# Patient Record
Sex: FEMALE | Race: WHITE | NOT HISPANIC OR LATINO | Employment: OTHER | URBAN - METROPOLITAN AREA
[De-identification: names, ages, dates, MRNs, and addresses within clinical notes are randomized per-mention and may not be internally consistent; named-entity substitution may affect disease eponyms.]

---

## 2017-01-05 ENCOUNTER — ALLSCRIPTS OFFICE VISIT (OUTPATIENT)
Dept: OTHER | Facility: OTHER | Age: 62
End: 2017-01-05

## 2017-01-12 ENCOUNTER — GENERIC CONVERSION - ENCOUNTER (OUTPATIENT)
Dept: OTHER | Facility: OTHER | Age: 62
End: 2017-01-12

## 2017-01-25 ENCOUNTER — APPOINTMENT (OUTPATIENT)
Dept: LAB | Age: 62
End: 2017-01-25
Payer: COMMERCIAL

## 2017-01-25 ENCOUNTER — TRANSCRIBE ORDERS (OUTPATIENT)
Dept: ADMINISTRATIVE | Age: 62
End: 2017-01-25

## 2017-01-25 DIAGNOSIS — I10 ESSENTIAL (PRIMARY) HYPERTENSION: ICD-10-CM

## 2017-01-25 DIAGNOSIS — E55.9 VITAMIN D DEFICIENCY: ICD-10-CM

## 2017-01-25 DIAGNOSIS — E78.5 HYPERLIPIDEMIA: ICD-10-CM

## 2017-01-25 DIAGNOSIS — E11.9 TYPE 2 DIABETES MELLITUS WITHOUT COMPLICATIONS (HCC): ICD-10-CM

## 2017-01-25 LAB
25(OH)D3 SERPL-MCNC: 35.8 NG/ML (ref 30–100)
ALBUMIN SERPL BCP-MCNC: 3.5 G/DL (ref 3.5–5)
ALP SERPL-CCNC: 69 U/L (ref 46–116)
ALT SERPL W P-5'-P-CCNC: 32 U/L (ref 12–78)
ANION GAP SERPL CALCULATED.3IONS-SCNC: 9 MMOL/L (ref 4–13)
AST SERPL W P-5'-P-CCNC: 27 U/L (ref 5–45)
BASOPHILS # BLD AUTO: 0.05 THOUSANDS/ΜL (ref 0–0.1)
BASOPHILS NFR BLD AUTO: 1 % (ref 0–1)
BILIRUB SERPL-MCNC: 0.5 MG/DL (ref 0.2–1)
BUN SERPL-MCNC: 10 MG/DL (ref 5–25)
CALCIUM SERPL-MCNC: 9.1 MG/DL (ref 8.3–10.1)
CHLORIDE SERPL-SCNC: 105 MMOL/L (ref 100–108)
CHOLEST SERPL-MCNC: 182 MG/DL (ref 50–200)
CO2 SERPL-SCNC: 27 MMOL/L (ref 21–32)
CREAT SERPL-MCNC: 0.7 MG/DL (ref 0.6–1.3)
CREAT UR-MCNC: 99.2 MG/DL
EOSINOPHIL # BLD AUTO: 0.42 THOUSAND/ΜL (ref 0–0.61)
EOSINOPHIL NFR BLD AUTO: 5 % (ref 0–6)
ERYTHROCYTE [DISTWIDTH] IN BLOOD BY AUTOMATED COUNT: 13.6 % (ref 11.6–15.1)
GFR SERPL CREATININE-BSD FRML MDRD: >60 ML/MIN/1.73SQ M
GLUCOSE SERPL-MCNC: 156 MG/DL (ref 65–140)
HCT VFR BLD AUTO: 40.7 % (ref 34.8–46.1)
HDLC SERPL-MCNC: 82 MG/DL (ref 40–60)
HGB BLD-MCNC: 13.5 G/DL (ref 11.5–15.4)
LDLC SERPL CALC-MCNC: 54 MG/DL (ref 0–100)
LYMPHOCYTES # BLD AUTO: 1.9 THOUSANDS/ΜL (ref 0.6–4.47)
LYMPHOCYTES NFR BLD AUTO: 23 % (ref 14–44)
MCH RBC QN AUTO: 29.5 PG (ref 26.8–34.3)
MCHC RBC AUTO-ENTMCNC: 33.2 G/DL (ref 31.4–37.4)
MCV RBC AUTO: 89 FL (ref 82–98)
MICROALBUMIN UR-MCNC: 29.4 MG/L (ref 0–20)
MICROALBUMIN/CREAT 24H UR: 30 MG/G CREATININE (ref 0–30)
MONOCYTES # BLD AUTO: 0.42 THOUSAND/ΜL (ref 0.17–1.22)
MONOCYTES NFR BLD AUTO: 5 % (ref 4–12)
NEUTROPHILS # BLD AUTO: 5.53 THOUSANDS/ΜL (ref 1.85–7.62)
NEUTS SEG NFR BLD AUTO: 66 % (ref 43–75)
NRBC BLD AUTO-RTO: 0 /100 WBCS
PLATELET # BLD AUTO: 301 THOUSANDS/UL (ref 149–390)
PMV BLD AUTO: 11.2 FL (ref 8.9–12.7)
POTASSIUM SERPL-SCNC: 4.2 MMOL/L (ref 3.5–5.3)
PROT SERPL-MCNC: 7.4 G/DL (ref 6.4–8.2)
RBC # BLD AUTO: 4.57 MILLION/UL (ref 3.81–5.12)
SODIUM SERPL-SCNC: 141 MMOL/L (ref 136–145)
TRIGL SERPL-MCNC: 231 MG/DL
TSH SERPL DL<=0.05 MIU/L-ACNC: 2.69 UIU/ML (ref 0.36–3.74)
WBC # BLD AUTO: 8.34 THOUSAND/UL (ref 4.31–10.16)

## 2017-01-25 PROCEDURE — 80061 LIPID PANEL: CPT

## 2017-01-25 PROCEDURE — 36415 COLL VENOUS BLD VENIPUNCTURE: CPT

## 2017-01-25 PROCEDURE — 80053 COMPREHEN METABOLIC PANEL: CPT

## 2017-01-25 PROCEDURE — 84443 ASSAY THYROID STIM HORMONE: CPT

## 2017-01-25 PROCEDURE — 85025 COMPLETE CBC W/AUTO DIFF WBC: CPT

## 2017-01-25 PROCEDURE — 82306 VITAMIN D 25 HYDROXY: CPT

## 2017-01-25 PROCEDURE — 82570 ASSAY OF URINE CREATININE: CPT

## 2017-01-25 PROCEDURE — 82043 UR ALBUMIN QUANTITATIVE: CPT

## 2017-01-27 ENCOUNTER — GENERIC CONVERSION - ENCOUNTER (OUTPATIENT)
Dept: OTHER | Facility: OTHER | Age: 62
End: 2017-01-27

## 2017-01-30 ENCOUNTER — ALLSCRIPTS OFFICE VISIT (OUTPATIENT)
Dept: OTHER | Facility: OTHER | Age: 62
End: 2017-01-30

## 2017-01-30 DIAGNOSIS — R26.9 ABNORMALITY OF GAIT AND MOBILITY: ICD-10-CM

## 2017-01-30 DIAGNOSIS — Z12.31 ENCOUNTER FOR SCREENING MAMMOGRAM FOR MALIGNANT NEOPLASM OF BREAST: ICD-10-CM

## 2017-01-30 DIAGNOSIS — M54.50 LOW BACK PAIN: ICD-10-CM

## 2017-02-01 ENCOUNTER — APPOINTMENT (OUTPATIENT)
Dept: PHYSICAL THERAPY | Facility: REHABILITATION | Age: 62
End: 2017-02-01
Payer: COMMERCIAL

## 2017-02-01 ENCOUNTER — GENERIC CONVERSION - ENCOUNTER (OUTPATIENT)
Dept: OTHER | Facility: OTHER | Age: 62
End: 2017-02-01

## 2017-02-01 ENCOUNTER — ALLSCRIPTS OFFICE VISIT (OUTPATIENT)
Dept: OTHER | Facility: OTHER | Age: 62
End: 2017-02-01

## 2017-02-01 DIAGNOSIS — R26.9 ABNORMALITY OF GAIT AND MOBILITY: ICD-10-CM

## 2017-02-01 DIAGNOSIS — M54.50 LOW BACK PAIN: ICD-10-CM

## 2017-02-01 PROCEDURE — 97162 PT EVAL MOD COMPLEX 30 MIN: CPT

## 2017-02-07 ENCOUNTER — APPOINTMENT (OUTPATIENT)
Dept: PHYSICAL THERAPY | Facility: REHABILITATION | Age: 62
End: 2017-02-07
Payer: COMMERCIAL

## 2017-02-07 ENCOUNTER — ALLSCRIPTS OFFICE VISIT (OUTPATIENT)
Dept: OTHER | Facility: OTHER | Age: 62
End: 2017-02-07

## 2017-02-10 ENCOUNTER — APPOINTMENT (OUTPATIENT)
Dept: PHYSICAL THERAPY | Facility: REHABILITATION | Age: 62
End: 2017-02-10
Payer: COMMERCIAL

## 2017-02-14 ENCOUNTER — APPOINTMENT (OUTPATIENT)
Dept: PHYSICAL THERAPY | Facility: REHABILITATION | Age: 62
End: 2017-02-14
Payer: COMMERCIAL

## 2017-02-14 PROCEDURE — 97110 THERAPEUTIC EXERCISES: CPT

## 2017-02-14 PROCEDURE — 97140 MANUAL THERAPY 1/> REGIONS: CPT

## 2017-02-16 ENCOUNTER — APPOINTMENT (OUTPATIENT)
Dept: PHYSICAL THERAPY | Facility: REHABILITATION | Age: 62
End: 2017-02-16
Payer: COMMERCIAL

## 2017-02-16 PROCEDURE — 97140 MANUAL THERAPY 1/> REGIONS: CPT

## 2017-02-16 PROCEDURE — 97110 THERAPEUTIC EXERCISES: CPT

## 2017-02-17 ENCOUNTER — APPOINTMENT (OUTPATIENT)
Dept: PHYSICAL THERAPY | Facility: REHABILITATION | Age: 62
End: 2017-02-17
Payer: COMMERCIAL

## 2017-02-21 ENCOUNTER — APPOINTMENT (OUTPATIENT)
Dept: PHYSICAL THERAPY | Facility: REHABILITATION | Age: 62
End: 2017-02-21
Payer: COMMERCIAL

## 2017-02-21 ENCOUNTER — ALLSCRIPTS OFFICE VISIT (OUTPATIENT)
Dept: OTHER | Facility: OTHER | Age: 62
End: 2017-02-21

## 2017-02-21 PROCEDURE — 97140 MANUAL THERAPY 1/> REGIONS: CPT

## 2017-02-21 PROCEDURE — 97110 THERAPEUTIC EXERCISES: CPT

## 2017-02-24 ENCOUNTER — APPOINTMENT (OUTPATIENT)
Dept: PHYSICAL THERAPY | Facility: REHABILITATION | Age: 62
End: 2017-02-24
Payer: COMMERCIAL

## 2017-02-24 PROCEDURE — 97110 THERAPEUTIC EXERCISES: CPT

## 2017-02-28 ENCOUNTER — APPOINTMENT (OUTPATIENT)
Dept: PHYSICAL THERAPY | Facility: REHABILITATION | Age: 62
End: 2017-02-28
Payer: COMMERCIAL

## 2017-02-28 PROCEDURE — 97110 THERAPEUTIC EXERCISES: CPT

## 2017-03-03 ENCOUNTER — APPOINTMENT (OUTPATIENT)
Dept: PHYSICAL THERAPY | Facility: REHABILITATION | Age: 62
End: 2017-03-03
Payer: COMMERCIAL

## 2017-03-03 PROCEDURE — 97110 THERAPEUTIC EXERCISES: CPT

## 2017-03-07 ENCOUNTER — APPOINTMENT (OUTPATIENT)
Dept: PHYSICAL THERAPY | Facility: REHABILITATION | Age: 62
End: 2017-03-07
Payer: COMMERCIAL

## 2017-03-07 PROCEDURE — 97110 THERAPEUTIC EXERCISES: CPT

## 2017-03-10 ENCOUNTER — APPOINTMENT (OUTPATIENT)
Dept: PHYSICAL THERAPY | Facility: REHABILITATION | Age: 62
End: 2017-03-10
Payer: COMMERCIAL

## 2017-03-23 ENCOUNTER — ALLSCRIPTS OFFICE VISIT (OUTPATIENT)
Dept: OTHER | Facility: OTHER | Age: 62
End: 2017-03-23

## 2017-03-30 ENCOUNTER — ALLSCRIPTS OFFICE VISIT (OUTPATIENT)
Dept: OTHER | Facility: OTHER | Age: 62
End: 2017-03-30

## 2017-04-13 ENCOUNTER — ALLSCRIPTS OFFICE VISIT (OUTPATIENT)
Dept: OTHER | Facility: OTHER | Age: 62
End: 2017-04-13

## 2017-04-13 DIAGNOSIS — Z11.59 ENCOUNTER FOR SCREENING FOR OTHER VIRAL DISEASES: ICD-10-CM

## 2017-04-13 DIAGNOSIS — E11.9 TYPE 2 DIABETES MELLITUS WITHOUT COMPLICATIONS (HCC): ICD-10-CM

## 2017-04-20 ENCOUNTER — TRANSCRIBE ORDERS (OUTPATIENT)
Dept: ADMINISTRATIVE | Age: 62
End: 2017-04-20

## 2017-04-20 ENCOUNTER — APPOINTMENT (OUTPATIENT)
Dept: LAB | Age: 62
End: 2017-04-20
Payer: COMMERCIAL

## 2017-04-20 ENCOUNTER — ALLSCRIPTS OFFICE VISIT (OUTPATIENT)
Dept: OTHER | Facility: OTHER | Age: 62
End: 2017-04-20

## 2017-04-20 ENCOUNTER — GENERIC CONVERSION - ENCOUNTER (OUTPATIENT)
Dept: OTHER | Facility: OTHER | Age: 62
End: 2017-04-20

## 2017-04-20 DIAGNOSIS — E11.9 TYPE 2 DIABETES MELLITUS WITHOUT COMPLICATIONS (HCC): ICD-10-CM

## 2017-04-20 LAB
ALBUMIN SERPL BCP-MCNC: 3.5 G/DL (ref 3.5–5)
ALP SERPL-CCNC: 76 U/L (ref 46–116)
ALT SERPL W P-5'-P-CCNC: 52 U/L (ref 12–78)
ANION GAP SERPL CALCULATED.3IONS-SCNC: 9 MMOL/L (ref 4–13)
AST SERPL W P-5'-P-CCNC: 68 U/L (ref 5–45)
BILIRUB SERPL-MCNC: 0.31 MG/DL (ref 0.2–1)
BUN SERPL-MCNC: 11 MG/DL (ref 5–25)
CALCIUM SERPL-MCNC: 9.8 MG/DL (ref 8.3–10.1)
CHLORIDE SERPL-SCNC: 103 MMOL/L (ref 100–108)
CO2 SERPL-SCNC: 28 MMOL/L (ref 21–32)
CREAT SERPL-MCNC: 0.69 MG/DL (ref 0.6–1.3)
EST. AVERAGE GLUCOSE BLD GHB EST-MCNC: 151 MG/DL
GFR SERPL CREATININE-BSD FRML MDRD: >60 ML/MIN/1.73SQ M
GLUCOSE SERPL-MCNC: 95 MG/DL (ref 65–140)
HBA1C MFR BLD: 6.9 % (ref 4.2–6.3)
POTASSIUM SERPL-SCNC: 4.6 MMOL/L (ref 3.5–5.3)
PROT SERPL-MCNC: 7.6 G/DL (ref 6.4–8.2)
SODIUM SERPL-SCNC: 140 MMOL/L (ref 136–145)

## 2017-04-20 PROCEDURE — 36415 COLL VENOUS BLD VENIPUNCTURE: CPT

## 2017-04-20 PROCEDURE — 83036 HEMOGLOBIN GLYCOSYLATED A1C: CPT

## 2017-04-20 PROCEDURE — 80053 COMPREHEN METABOLIC PANEL: CPT

## 2017-04-24 ENCOUNTER — ALLSCRIPTS OFFICE VISIT (OUTPATIENT)
Dept: OTHER | Facility: OTHER | Age: 62
End: 2017-04-24

## 2017-04-27 ENCOUNTER — ALLSCRIPTS OFFICE VISIT (OUTPATIENT)
Dept: OTHER | Facility: OTHER | Age: 62
End: 2017-04-27

## 2017-05-05 ENCOUNTER — HOSPITAL ENCOUNTER (OUTPATIENT)
Dept: RADIOLOGY | Age: 62
Discharge: HOME | End: 2017-05-05
Payer: COMMERCIAL

## 2017-05-05 ENCOUNTER — GENERIC CONVERSION - ENCOUNTER (OUTPATIENT)
Dept: OTHER | Facility: OTHER | Age: 62
End: 2017-05-05

## 2017-05-05 DIAGNOSIS — Z12.31 ENCOUNTER FOR SCREENING MAMMOGRAM FOR MALIGNANT NEOPLASM OF BREAST: ICD-10-CM

## 2017-05-05 PROCEDURE — G0202 SCR MAMMO BI INCL CAD: HCPCS

## 2017-05-06 ENCOUNTER — OFFICE VISIT (OUTPATIENT)
Dept: URGENT CARE | Facility: MEDICAL CENTER | Age: 62
End: 2017-05-06
Payer: COMMERCIAL

## 2017-05-06 PROCEDURE — 99213 OFFICE O/P EST LOW 20 MIN: CPT

## 2017-05-06 PROCEDURE — G0463 HOSPITAL OUTPT CLINIC VISIT: HCPCS

## 2017-06-07 ENCOUNTER — ALLSCRIPTS OFFICE VISIT (OUTPATIENT)
Dept: OTHER | Facility: OTHER | Age: 62
End: 2017-06-07

## 2017-06-07 DIAGNOSIS — E66.01 MORBID (SEVERE) OBESITY DUE TO EXCESS CALORIES (HCC): ICD-10-CM

## 2017-06-07 DIAGNOSIS — R74.8 ABNORMAL LEVELS OF OTHER SERUM ENZYMES: ICD-10-CM

## 2017-06-08 ENCOUNTER — HOSPITAL ENCOUNTER (OUTPATIENT)
Dept: RADIOLOGY | Age: 62
Discharge: HOME | End: 2017-06-08
Payer: COMMERCIAL

## 2017-06-08 DIAGNOSIS — R74.8 ABNORMAL LEVELS OF OTHER SERUM ENZYMES: ICD-10-CM

## 2017-06-08 DIAGNOSIS — E66.01 MORBID (SEVERE) OBESITY DUE TO EXCESS CALORIES (HCC): ICD-10-CM

## 2017-06-08 PROCEDURE — 76705 ECHO EXAM OF ABDOMEN: CPT

## 2017-06-13 ENCOUNTER — ALLSCRIPTS OFFICE VISIT (OUTPATIENT)
Dept: OTHER | Facility: OTHER | Age: 62
End: 2017-06-13

## 2017-06-19 ENCOUNTER — ALLSCRIPTS OFFICE VISIT (OUTPATIENT)
Dept: OTHER | Facility: OTHER | Age: 62
End: 2017-06-19

## 2017-06-29 ENCOUNTER — ALLSCRIPTS OFFICE VISIT (OUTPATIENT)
Dept: OTHER | Facility: OTHER | Age: 62
End: 2017-06-29

## 2017-07-05 ENCOUNTER — ALLSCRIPTS OFFICE VISIT (OUTPATIENT)
Dept: OTHER | Facility: OTHER | Age: 62
End: 2017-07-05

## 2017-07-21 DIAGNOSIS — E11.9 TYPE 2 DIABETES MELLITUS WITHOUT COMPLICATIONS (HCC): ICD-10-CM

## 2017-07-28 ENCOUNTER — GENERIC CONVERSION - ENCOUNTER (OUTPATIENT)
Dept: OTHER | Facility: OTHER | Age: 62
End: 2017-07-28

## 2017-07-28 ENCOUNTER — APPOINTMENT (OUTPATIENT)
Dept: LAB | Age: 62
End: 2017-07-28
Payer: COMMERCIAL

## 2017-07-28 ENCOUNTER — TRANSCRIBE ORDERS (OUTPATIENT)
Dept: ADMINISTRATIVE | Age: 62
End: 2017-07-28

## 2017-07-28 DIAGNOSIS — Z11.59 ENCOUNTER FOR SCREENING FOR OTHER VIRAL DISEASES: ICD-10-CM

## 2017-07-28 DIAGNOSIS — E11.9 TYPE 2 DIABETES MELLITUS WITHOUT COMPLICATIONS (HCC): ICD-10-CM

## 2017-07-28 LAB
ALBUMIN SERPL BCP-MCNC: 3.6 G/DL (ref 3.5–5)
ALP SERPL-CCNC: 71 U/L (ref 46–116)
ALT SERPL W P-5'-P-CCNC: 44 U/L (ref 12–78)
ANION GAP SERPL CALCULATED.3IONS-SCNC: 7 MMOL/L (ref 4–13)
AST SERPL W P-5'-P-CCNC: 38 U/L (ref 5–45)
BILIRUB SERPL-MCNC: 0.45 MG/DL (ref 0.2–1)
BUN SERPL-MCNC: 9 MG/DL (ref 5–25)
CALCIUM SERPL-MCNC: 9.3 MG/DL (ref 8.3–10.1)
CHLORIDE SERPL-SCNC: 104 MMOL/L (ref 100–108)
CO2 SERPL-SCNC: 28 MMOL/L (ref 21–32)
CREAT SERPL-MCNC: 0.64 MG/DL (ref 0.6–1.3)
EST. AVERAGE GLUCOSE BLD GHB EST-MCNC: 166 MG/DL
GFR SERPL CREATININE-BSD FRML MDRD: 96 ML/MIN/1.73SQ M
GLUCOSE SERPL-MCNC: 160 MG/DL (ref 65–140)
HBA1C MFR BLD: 7.4 % (ref 4.2–6.3)
HCV AB SER QL: NORMAL
POTASSIUM SERPL-SCNC: 4.4 MMOL/L (ref 3.5–5.3)
PROT SERPL-MCNC: 7.4 G/DL (ref 6.4–8.2)
SODIUM SERPL-SCNC: 139 MMOL/L (ref 136–145)

## 2017-07-28 PROCEDURE — 80053 COMPREHEN METABOLIC PANEL: CPT

## 2017-07-28 PROCEDURE — 36415 COLL VENOUS BLD VENIPUNCTURE: CPT

## 2017-07-28 PROCEDURE — 83036 HEMOGLOBIN GLYCOSYLATED A1C: CPT

## 2017-07-28 PROCEDURE — 86803 HEPATITIS C AB TEST: CPT

## 2017-08-02 ENCOUNTER — ANESTHESIA EVENT (OUTPATIENT)
Dept: GASTROENTEROLOGY | Facility: HOSPITAL | Age: 62
End: 2017-08-02
Payer: COMMERCIAL

## 2017-08-02 RX ORDER — SIMVASTATIN 40 MG
40 TABLET ORAL
COMMUNITY
End: 2018-07-27 | Stop reason: SDUPTHER

## 2017-08-02 RX ORDER — QUINAPRIL 40 MG/1
40 TABLET ORAL
COMMUNITY
End: 2018-07-27 | Stop reason: SDUPTHER

## 2017-08-02 RX ORDER — CHOLECALCIFEROL (VITAMIN D3) 50 MCG
2000 TABLET ORAL DAILY
COMMUNITY

## 2017-08-02 RX ORDER — FLUTICASONE PROPIONATE 50 MCG
1 SPRAY, SUSPENSION (ML) NASAL DAILY
COMMUNITY

## 2017-08-02 RX ORDER — PAROXETINE HYDROCHLORIDE 40 MG/1
40 TABLET, FILM COATED ORAL EVERY MORNING
COMMUNITY
End: 2018-02-21

## 2017-08-02 RX ORDER — DICLOFENAC SODIUM AND MISOPROSTOL 75; 200 MG/1; UG/1
1 TABLET, FILM COATED ORAL 2 TIMES DAILY
COMMUNITY
End: 2019-07-12 | Stop reason: ALTCHOICE

## 2017-08-03 ENCOUNTER — HOSPITAL ENCOUNTER (OUTPATIENT)
Facility: HOSPITAL | Age: 62
Setting detail: OUTPATIENT SURGERY
Discharge: HOME | End: 2017-08-03
Attending: INTERNAL MEDICINE | Admitting: INTERNAL MEDICINE
Payer: COMMERCIAL

## 2017-08-03 ENCOUNTER — ANESTHESIA (OUTPATIENT)
Dept: GASTROENTEROLOGY | Facility: HOSPITAL | Age: 62
End: 2017-08-03
Payer: COMMERCIAL

## 2017-08-03 ENCOUNTER — GENERIC CONVERSION - ENCOUNTER (OUTPATIENT)
Dept: OTHER | Facility: OTHER | Age: 62
End: 2017-08-03

## 2017-08-03 VITALS
HEIGHT: 66 IN | TEMPERATURE: 98.9 F | WEIGHT: 293 LBS | HEART RATE: 106 BPM | SYSTOLIC BLOOD PRESSURE: 122 MMHG | DIASTOLIC BLOOD PRESSURE: 75 MMHG | OXYGEN SATURATION: 95 % | BODY MASS INDEX: 47.09 KG/M2 | RESPIRATION RATE: 16 BRPM

## 2017-08-03 DIAGNOSIS — Z12.11 ENCOUNTER FOR SCREENING FOR MALIGNANT NEOPLASM OF COLON: ICD-10-CM

## 2017-08-03 DIAGNOSIS — Z12.11 COLON CANCER SCREENING: Primary | ICD-10-CM

## 2017-08-03 PROCEDURE — 88305 TISSUE EXAM BY PATHOLOGIST: CPT | Performed by: INTERNAL MEDICINE

## 2017-08-03 RX ORDER — OXYBUTYNIN CHLORIDE 5 MG/1
5 TABLET ORAL 3 TIMES DAILY
COMMUNITY
End: 2018-04-30 | Stop reason: ALTCHOICE

## 2017-08-03 RX ORDER — LIDOCAINE HYDROCHLORIDE 10 MG/ML
INJECTION, SOLUTION INFILTRATION; PERINEURAL AS NEEDED
Status: DISCONTINUED | OUTPATIENT
Start: 2017-08-03 | End: 2017-08-03 | Stop reason: SURG

## 2017-08-03 RX ORDER — PROPOFOL 10 MG/ML
INJECTION, EMULSION INTRAVENOUS AS NEEDED
Status: DISCONTINUED | OUTPATIENT
Start: 2017-08-03 | End: 2017-08-03 | Stop reason: SURG

## 2017-08-03 RX ORDER — FEXOFENADINE HCL 180 MG/1
180 TABLET ORAL DAILY
COMMUNITY

## 2017-08-03 RX ORDER — SODIUM CHLORIDE 9 MG/ML
125 INJECTION, SOLUTION INTRAVENOUS CONTINUOUS
Status: DISCONTINUED | OUTPATIENT
Start: 2017-08-03 | End: 2017-08-03 | Stop reason: HOSPADM

## 2017-08-03 RX ADMIN — PROPOFOL 100 MG: 10 INJECTION, EMULSION INTRAVENOUS at 09:33

## 2017-08-03 RX ADMIN — PROPOFOL 20 MG: 10 INJECTION, EMULSION INTRAVENOUS at 09:46

## 2017-08-03 RX ADMIN — PROPOFOL 50 MG: 10 INJECTION, EMULSION INTRAVENOUS at 09:35

## 2017-08-03 RX ADMIN — SODIUM CHLORIDE 125 ML/HR: 0.9 INJECTION, SOLUTION INTRAVENOUS at 09:18

## 2017-08-03 RX ADMIN — LIDOCAINE HYDROCHLORIDE 20 MG: 10 INJECTION, SOLUTION INFILTRATION; PERINEURAL at 09:33

## 2017-08-03 RX ADMIN — PROPOFOL 50 MG: 10 INJECTION, EMULSION INTRAVENOUS at 09:36

## 2017-08-03 RX ADMIN — PROPOFOL 20 MG: 10 INJECTION, EMULSION INTRAVENOUS at 09:38

## 2017-08-03 RX ADMIN — PROPOFOL 20 MG: 10 INJECTION, EMULSION INTRAVENOUS at 09:48

## 2017-08-03 RX ADMIN — PROPOFOL 20 MG: 10 INJECTION, EMULSION INTRAVENOUS at 09:40

## 2017-08-07 ENCOUNTER — ALLSCRIPTS OFFICE VISIT (OUTPATIENT)
Dept: OTHER | Facility: OTHER | Age: 62
End: 2017-08-07

## 2017-08-13 ENCOUNTER — GENERIC CONVERSION - ENCOUNTER (OUTPATIENT)
Dept: OTHER | Facility: OTHER | Age: 62
End: 2017-08-13

## 2017-08-22 ENCOUNTER — GENERIC CONVERSION - ENCOUNTER (OUTPATIENT)
Dept: OTHER | Facility: OTHER | Age: 62
End: 2017-08-22

## 2017-08-29 ENCOUNTER — ALLSCRIPTS OFFICE VISIT (OUTPATIENT)
Dept: OTHER | Facility: OTHER | Age: 62
End: 2017-08-29

## 2017-09-14 ENCOUNTER — TRANSCRIBE ORDERS (OUTPATIENT)
Dept: ADMINISTRATIVE | Facility: HOSPITAL | Age: 62
End: 2017-09-14

## 2017-09-14 DIAGNOSIS — M81.0 SENILE OSTEOPOROSIS: Primary | ICD-10-CM

## 2017-09-21 ENCOUNTER — ALLSCRIPTS OFFICE VISIT (OUTPATIENT)
Dept: OTHER | Facility: OTHER | Age: 62
End: 2017-09-21

## 2017-09-26 ENCOUNTER — HOSPITAL ENCOUNTER (OUTPATIENT)
Dept: RADIOLOGY | Age: 62
Discharge: HOME | End: 2017-09-26
Payer: COMMERCIAL

## 2017-09-26 DIAGNOSIS — M81.0 SENILE OSTEOPOROSIS: ICD-10-CM

## 2017-09-26 PROCEDURE — 77080 DXA BONE DENSITY AXIAL: CPT

## 2017-10-25 ENCOUNTER — ALLSCRIPTS OFFICE VISIT (OUTPATIENT)
Dept: OTHER | Facility: OTHER | Age: 62
End: 2017-10-25

## 2017-11-06 ENCOUNTER — ALLSCRIPTS OFFICE VISIT (OUTPATIENT)
Dept: OTHER | Facility: OTHER | Age: 62
End: 2017-11-06

## 2017-11-07 DIAGNOSIS — R74.8 ABNORMAL LEVELS OF OTHER SERUM ENZYMES: ICD-10-CM

## 2017-11-07 DIAGNOSIS — E11.9 TYPE 2 DIABETES MELLITUS WITHOUT COMPLICATIONS (HCC): ICD-10-CM

## 2017-11-28 LAB
LEFT EYE DIABETIC RETINOPATHY: NORMAL
RIGHT EYE DIABETIC RETINOPATHY: NORMAL

## 2017-12-11 ENCOUNTER — TRANSCRIBE ORDERS (OUTPATIENT)
Dept: ADMINISTRATIVE | Age: 62
End: 2017-12-11

## 2017-12-11 ENCOUNTER — APPOINTMENT (OUTPATIENT)
Dept: LAB | Age: 62
End: 2017-12-11
Payer: COMMERCIAL

## 2017-12-11 ENCOUNTER — GENERIC CONVERSION - ENCOUNTER (OUTPATIENT)
Dept: OTHER | Facility: OTHER | Age: 62
End: 2017-12-11

## 2017-12-11 DIAGNOSIS — I10 ESSENTIAL HYPERTENSION, MALIGNANT: ICD-10-CM

## 2017-12-11 DIAGNOSIS — Z79.899 NEED FOR PROPHYLACTIC CHEMOTHERAPY: ICD-10-CM

## 2017-12-11 DIAGNOSIS — E13.8 DIABETES MELLITUS OF OTHER TYPE WITH COMPLICATION, UNSPECIFIED LONG TERM INSULIN USE STATUS: ICD-10-CM

## 2017-12-11 DIAGNOSIS — E11.9 TYPE 2 DIABETES MELLITUS WITHOUT COMPLICATIONS (HCC): ICD-10-CM

## 2017-12-11 DIAGNOSIS — E13.8 DIABETES MELLITUS OF OTHER TYPE WITH COMPLICATION, UNSPECIFIED LONG TERM INSULIN USE STATUS: Primary | ICD-10-CM

## 2017-12-11 DIAGNOSIS — R74.8 ABNORMAL LEVELS OF OTHER SERUM ENZYMES: ICD-10-CM

## 2017-12-11 LAB
25(OH)D3 SERPL-MCNC: 33.4 NG/ML (ref 30–100)
ALBUMIN SERPL BCP-MCNC: 3.6 G/DL (ref 3.5–5)
ALP SERPL-CCNC: 64 U/L (ref 46–116)
ALT SERPL W P-5'-P-CCNC: 37 U/L (ref 12–78)
ANION GAP SERPL CALCULATED.3IONS-SCNC: 7 MMOL/L (ref 4–13)
AST SERPL W P-5'-P-CCNC: 30 U/L (ref 5–45)
BACTERIA UR QL AUTO: ABNORMAL /HPF
BASOPHILS # BLD AUTO: 0.05 THOUSANDS/ΜL (ref 0–0.1)
BASOPHILS NFR BLD AUTO: 1 % (ref 0–1)
BILIRUB SERPL-MCNC: 0.38 MG/DL (ref 0.2–1)
BILIRUB UR QL STRIP: NEGATIVE
BUN SERPL-MCNC: 11 MG/DL (ref 5–25)
CALCIUM SERPL-MCNC: 9.4 MG/DL (ref 8.3–10.1)
CHLORIDE SERPL-SCNC: 106 MMOL/L (ref 100–108)
CK SERPL-CCNC: 57 U/L (ref 26–192)
CLARITY UR: CLEAR
CO2 SERPL-SCNC: 27 MMOL/L (ref 21–32)
COLOR UR: YELLOW
CREAT SERPL-MCNC: 0.64 MG/DL (ref 0.6–1.3)
CRP SERPL QL: 3.8 MG/L
EOSINOPHIL # BLD AUTO: 0.36 THOUSAND/ΜL (ref 0–0.61)
EOSINOPHIL NFR BLD AUTO: 5 % (ref 0–6)
ERYTHROCYTE [DISTWIDTH] IN BLOOD BY AUTOMATED COUNT: 13.3 % (ref 11.6–15.1)
ERYTHROCYTE [SEDIMENTATION RATE] IN BLOOD: 19 MM/HOUR (ref 0–20)
EST. AVERAGE GLUCOSE BLD GHB EST-MCNC: 163 MG/DL
FERRITIN SERPL-MCNC: 52 NG/ML (ref 8–388)
GFR SERPL CREATININE-BSD FRML MDRD: 96 ML/MIN/1.73SQ M
GLUCOSE P FAST SERPL-MCNC: 128 MG/DL (ref 65–99)
GLUCOSE UR STRIP-MCNC: NEGATIVE MG/DL
HBA1C MFR BLD: 7.3 % (ref 4.2–6.3)
HCT VFR BLD AUTO: 41.8 % (ref 34.8–46.1)
HGB BLD-MCNC: 13.6 G/DL (ref 11.5–15.4)
HGB UR QL STRIP.AUTO: NEGATIVE
IRON SATN MFR SERPL: 15 %
IRON SERPL-MCNC: 58 UG/DL (ref 50–170)
KETONES UR STRIP-MCNC: NEGATIVE MG/DL
LEUKOCYTE ESTERASE UR QL STRIP: ABNORMAL
LYMPHOCYTES # BLD AUTO: 2.29 THOUSANDS/ΜL (ref 0.6–4.47)
LYMPHOCYTES NFR BLD AUTO: 29 % (ref 14–44)
MCH RBC QN AUTO: 28.3 PG (ref 26.8–34.3)
MCHC RBC AUTO-ENTMCNC: 32.5 G/DL (ref 31.4–37.4)
MCV RBC AUTO: 87 FL (ref 82–98)
MONOCYTES # BLD AUTO: 0.38 THOUSAND/ΜL (ref 0.17–1.22)
MONOCYTES NFR BLD AUTO: 5 % (ref 4–12)
NEUTROPHILS # BLD AUTO: 4.89 THOUSANDS/ΜL (ref 1.85–7.62)
NEUTS SEG NFR BLD AUTO: 60 % (ref 43–75)
NITRITE UR QL STRIP: NEGATIVE
NON-SQ EPI CELLS URNS QL MICRO: ABNORMAL /HPF
NRBC BLD AUTO-RTO: 0 /100 WBCS
PH UR STRIP.AUTO: 6 [PH] (ref 4.5–8)
PLATELET # BLD AUTO: 306 THOUSANDS/UL (ref 149–390)
PMV BLD AUTO: 11 FL (ref 8.9–12.7)
POTASSIUM SERPL-SCNC: 4.2 MMOL/L (ref 3.5–5.3)
PROT SERPL-MCNC: 7.9 G/DL (ref 6.4–8.2)
PROT UR STRIP-MCNC: NEGATIVE MG/DL
RBC # BLD AUTO: 4.8 MILLION/UL (ref 3.81–5.12)
RBC #/AREA URNS AUTO: ABNORMAL /HPF
SODIUM SERPL-SCNC: 140 MMOL/L (ref 136–145)
SP GR UR STRIP.AUTO: 1.01 (ref 1–1.03)
TIBC SERPL-MCNC: 388 UG/DL (ref 250–450)
TSH SERPL DL<=0.05 MIU/L-ACNC: 2.13 UIU/ML (ref 0.36–3.74)
URATE SERPL-MCNC: 5 MG/DL (ref 2–6.8)
UROBILINOGEN UR QL STRIP.AUTO: 0.2 E.U./DL
WBC # BLD AUTO: 7.99 THOUSAND/UL (ref 4.31–10.16)
WBC #/AREA URNS AUTO: ABNORMAL /HPF

## 2017-12-11 PROCEDURE — 84443 ASSAY THYROID STIM HORMONE: CPT

## 2017-12-11 PROCEDURE — 83540 ASSAY OF IRON: CPT

## 2017-12-11 PROCEDURE — 86235 NUCLEAR ANTIGEN ANTIBODY: CPT

## 2017-12-11 PROCEDURE — 86430 RHEUMATOID FACTOR TEST QUAL: CPT

## 2017-12-11 PROCEDURE — 80053 COMPREHEN METABOLIC PANEL: CPT

## 2017-12-11 PROCEDURE — 82550 ASSAY OF CK (CPK): CPT

## 2017-12-11 PROCEDURE — 86038 ANTINUCLEAR ANTIBODIES: CPT

## 2017-12-11 PROCEDURE — 87340 HEPATITIS B SURFACE AG IA: CPT

## 2017-12-11 PROCEDURE — 84550 ASSAY OF BLOOD/URIC ACID: CPT

## 2017-12-11 PROCEDURE — 86803 HEPATITIS C AB TEST: CPT

## 2017-12-11 PROCEDURE — 82728 ASSAY OF FERRITIN: CPT

## 2017-12-11 PROCEDURE — 81001 URINALYSIS AUTO W/SCOPE: CPT | Performed by: INTERNAL MEDICINE

## 2017-12-11 PROCEDURE — 86200 CCP ANTIBODY: CPT

## 2017-12-11 PROCEDURE — 86704 HEP B CORE ANTIBODY TOTAL: CPT

## 2017-12-11 PROCEDURE — 83036 HEMOGLOBIN GLYCOSYLATED A1C: CPT

## 2017-12-11 PROCEDURE — 86617 LYME DISEASE ANTIBODY: CPT

## 2017-12-11 PROCEDURE — 86140 C-REACTIVE PROTEIN: CPT

## 2017-12-11 PROCEDURE — 36415 COLL VENOUS BLD VENIPUNCTURE: CPT

## 2017-12-11 PROCEDURE — 85652 RBC SED RATE AUTOMATED: CPT

## 2017-12-11 PROCEDURE — 85025 COMPLETE CBC W/AUTO DIFF WBC: CPT

## 2017-12-11 PROCEDURE — 86705 HEP B CORE ANTIBODY IGM: CPT

## 2017-12-11 PROCEDURE — 83550 IRON BINDING TEST: CPT

## 2017-12-11 PROCEDURE — 82306 VITAMIN D 25 HYDROXY: CPT

## 2017-12-12 LAB
ACTIN IGG SERPL-ACNC: 5 UNITS (ref 0–19)
B BURGDOR IGG PATRN SER IB-IMP: NEGATIVE
B BURGDOR IGM PATRN SER IB-IMP: NEGATIVE
B BURGDOR18KD IGG SER QL IB: ABNORMAL
B BURGDOR23KD IGG SER QL IB: ABNORMAL
B BURGDOR23KD IGM SER QL IB: PRESENT
B BURGDOR28KD IGG SER QL IB: ABNORMAL
B BURGDOR30KD IGG SER QL IB: ABNORMAL
B BURGDOR39KD IGG SER QL IB: ABNORMAL
B BURGDOR39KD IGM SER QL IB: ABNORMAL
B BURGDOR41KD IGG SER QL IB: PRESENT
B BURGDOR41KD IGM SER QL IB: ABNORMAL
B BURGDOR45KD IGG SER QL IB: ABNORMAL
B BURGDOR58KD IGG SER QL IB: ABNORMAL
B BURGDOR66KD IGG SER QL IB: ABNORMAL
B BURGDOR93KD IGG SER QL IB: ABNORMAL
HBV CORE AB SER QL: NORMAL
HBV CORE IGM SER QL: NORMAL
HBV SURFACE AG SER QL: NORMAL
HCV AB SER QL: NORMAL
RHEUMATOID FACT SER QL LA: NEGATIVE

## 2017-12-13 LAB
CCP IGA+IGG SERPL IA-ACNC: 3 UNITS (ref 0–19)
RYE IGE QN: NEGATIVE

## 2017-12-20 ENCOUNTER — ALLSCRIPTS OFFICE VISIT (OUTPATIENT)
Dept: OTHER | Facility: OTHER | Age: 62
End: 2017-12-20

## 2017-12-21 NOTE — PROGRESS NOTES
Assessment   1  Morbid obesity with BMI of 50 0-59 9, adult (278 01,V85 43) (B71 19,L35 22)   2  Diabetes mellitus, type 2 (250 00) (E11 9)   3  Fibromyalgia (729 1) (M79 7)   4  Hypertension (401 9) (I10)    Plan   Diabetes mellitus, type 2    · MetFORMIN HCl - 1000 MG Oral Tablet   · Januvia 100 MG Oral Tablet; TAKE 1 TABLET DAILY   · (1) COMPREHENSIVE METABOLIC PANEL; Status:Active; Requested for:20Apr2018;    · (1) HEMOGLOBIN A1C; Status:Active; Requested for:20Apr2018;    · *1 - [facility] FOR DIABETES Co-Management  *  Status:    Need Information - Financial Authorization  Requested for: [id number]  Care Summary provided  : Yes  Flu vaccine need    · Fluzone Quadrivalent 0 5 ML Intramuscular Suspension  Morbid obesity with BMI of 50 0-59 9, adult    · We recommend that you bring your body mass index down to 26 ; Status:Complete;      Done: 47DUJ3351    Discussion/Summary      Patient presents today for follow-up for chronic health issues  Diabetic control remains pretty much stable  She is having chronic diarrhea which is probably coming from IBS, but I would imagine her metformin is playing a role  I am going to switch her over to Januvia 100 mg daily  fibromyalgia has improved slightly with the addition of Januvia and she will continue to follow with Rheumatology  is stable with simvastatin  is well controlled with quinapril   has a history of fatty liver but LFTs ha  Chief Complaint   4M Follow up - DM, HTN shot given today      History of Present Illness   Patient presents today for follow-up for chronic health issues  She has a history of type 2 diabetes  She is not having any polyuria or polydipsia  She recently had some Botox injections which have helped with her urinary incontinence  The patient presents today for a hypertension follow-up  Patient remains compliant with medications and denies any chest pain, shortness of breath, palpitations, lightheadedness or diaphoresis  The patient presents today for follow-up for diabetes  There have been no significant episodes of hypo or hyperglycemia  The patient is not experiencing any blurry vision, polyuria, polydipsia, or peripheral neuropathy symptoms  Patient remains compliant with medications and routine follow-up    she has a history of fibromyalgia  She remains diffusely tender to palpation and has a lot of pain on a daily basis but this has improved since she initiated gabapentin  had her see GI for chronic diarrhea  They believe this is due to IBS  She also has some elevated LFTs and she had a workup for this which seems to be pointing towards fatty liver  She does continue to have multiple episodes of diarrhea in the morning  We had backed off on her metformin in the past which did not seem to help  Review of Systems        Constitutional: no fever,-- not feeling poorly,-- no recent weight gain,-- no chills,-- not feeling tired-- and-- no recent weight loss  Eyes: no eyesight problems  ENT: no sore throat-- and-- no hoarseness  Cardiovascular: the heart rate was not slow,-- no chest pain,-- the heart rate was not fast-- and-- no palpitations  Respiratory: no shortness of breath,-- no cough-- and-- no wheezing  Gastrointestinal: diarrhea, but-- as noted in HPI-- and-- no abdominal pain  Genitourinary: no dysuria  Musculoskeletal: no arthralgias-- and-- no myalgias  Integumentary: no rashes-- and-- no itching  Neurological: no headache  Psychiatric: anxiety, but-- as noted in HPI-- and-- no depression  Hematologic/Lymphatic: no tendency for easy bleeding  Active Problems   1  Ankle pain, unspecified laterality   2  Colon cancer screening (V76 51) (Z12 11)   3  Diabetes mellitus, type 2 (250 00) (E11 9)   4  Encounter for screening mammogram for breast cancer (V76 12) (Z12 31)   5  Essential tremor (333 1) (G25 0)   6  Fibromyalgia (729 1) (M79 7)   7   Flu vaccine need (V04 81) (Z23)   8  Gait disorder (781 2) (R26 9)   9  History of colon polyps (V12 72) (Z86 010)   10  Hyperlipidemia (272 4) (E78 5)   11  Hypertension (401 9) (I10)   12  Influenza vaccination administered during current admission (V04 81) (Z23)   13  Irritable bowel syndrome (564 1) (K58 9)   14  Lower back pain (724 2) (M54 5)   15  Major depressive disorder, recurrent episode, moderate (296 32) (F33 1)   16  Morbid obesity with BMI of 50 0-59 9, adult (278 01,V85 43) (E66 01,Z68 43)   17  Need for hepatitis C screening test (V73 89) (Z11 59)   18  Pap smear for cervical cancer screening (V76 2) (Z12 4)   19  Rosacea (695 3) (L71 9)   20  Serous otitis media (381 4) (H65 90)   21  Situational depression (309 0) (F43 21)   22  Tinea cruris (110 3) (B35 6)   23  Urinary incontinence (788 30) (R32)   24  Visit for screening mammogram (V76 12) (Z12 31)   25  Vitamin D deficiency (268 9) (E55 9)    Past Medical History   1  History of Dysfunction of Eustachian tube, unspecified laterality (381 81) (H69 80)   2  History of Endometriosis (617 9) (N80 9)   3  History of acute sinusitis (V12 69) (Z87 09)   4  History of anemia (V12 3) (Z86 2)   5  History of Bell's palsy (V12 49) (Z86 69)   6  History of Otalgia, unspecified laterality (388 70) (H92 09)     The active problems and past medical history were reviewed and updated today  Surgical History   1  History of Ankle Surgery   2  History of  Section   3  History of Spinal Anesthesia Epidural   4  History of Total Hip Replacement     The surgical history was reviewed and updated today  Family History   Mother    1  Family history of CABG  Father    2  Family history of Father  At Age 77   1  Family history of Gangrene     The family history was reviewed and updated today  Social History    · Alcohol Use (History)   · Never smoker  The social history was reviewed and updated today  Current Meds    1   Arthrotec 75-0 2 MG Oral Tablet Delayed Release; 1 PO BID; Therapy: 94CKD6850 to Recorded   2  Fluticasone Propionate 50 MCG/ACT Nasal Suspension; USE 2 SPRAYS IN EACH     NOSTRIL ONCE DAILY; Therapy: 66DHE9184 to (Keely Barraza)  Requested for: 43GVY2128; Last     Rx:09Uvf6143 Ordered   3  Gabapentin 100 MG Oral Capsule; Start with one capsule at bedtime titrate up to 3     capsules at bedtime based on symptoms; Therapy: (Recorded:79Ybv9820) to Recorded   4  MetFORMIN HCl - 1000 MG Oral Tablet; take 1 tablet twice a day; Therapy: 88GZW6179 to (Last Rx:03Nov2017)  Requested for: 80TYL9054 Ordered   5  Nystatin 822860 UNIT/GM External Cream; APPLY AND RUB IN A THIN FILM TO     AFFECTED AREAS TWICE DAILY (IN THE MORNING AND IN THE EVENING); Therapy: 63PXD1461 to (Last Rx:30Dec2016)  Requested for: 62Lal8346 Ordered   6  Nystop 640507 UNIT/GM External Powder; APPLY TO AFFECTED AREA TWICE DAILY AS     DIRECTED; Therapy: 42MAZ9381 to (Evaluate:28Jun2017)  Requested for: 48RRI1386; Last     Rx:13Opw6536 Ordered   7  OneTouch UltraSoft Lancets Miscellaneous; TEST TWICE DAILY OR AS DIRECTED; Therapy: 19VWK6193 to (Last Rx:30Jan2017)  Requested for: 30Jan2017 Ordered   8  OneTouch Verio In [employer]; TEST 1-2 x daily as directed by physician; Therapy: 47ULV4141 to (Last Rx:30Jan2017)  Requested for: 30Jan2017 Ordered   9  OneTouch Verio w/Device Kit; USE AS DIRECTED; Therapy: 42KMA6870 to (Last Rx:30Jan2017) Ordered   10  Quinapril HCl - 40 MG Oral Tablet; Take 1 tablet daily; Therapy: 37XIO6143 to (Last Rx:01Sep2017)  Requested for: 01Sep2017 Ordered   11  Simvastatin 40 MG Oral Tablet; Take 1 tablet daily; Therapy: 22ECP5806 to (Last Rx:01Sep2017)  Requested for: 01Sep2017 Ordered   12  Vitamin D 2000 UNIT Oral Capsule; take 3 daily; Therapy: 70Syv9430 to Recorded    Allergies   1  Augmentin TABS   2  Doxycycline Hyclate CAPS   3  Erythromycin TABS   4  Lodine CAPS   5  LORazepam TABS   6  Sulfa Drugs    Vitals   Vital Signs    Recorded: 73Xyd5917 11:28AM Recorded: 07Pxw0220 10:37AM   Heart Rate  100   Respiration  18   Systolic 381 094   Diastolic 78 98   Height  5 ft 6 in   Weight  308 lb    BMI Calculated  49 71   BSA Calculated  2 4   O2 Saturation  94, RA     Physical Exam        Constitutional      General appearance: Abnormal   morbidly obese  Eyes      Conjunctiva and lids: No swelling, erythema or discharge  Pupils and irises: Equal, round and reactive to light  Ears, Nose, Mouth, and Throat      Otoscopic examination: Tympanic membranes translucent with normal light reflex  Canals patent without erythema  Nasal mucosa, septum, and turbinates: Normal without edema or erythema  Oropharynx: Normal with no erythema, edema, exudate or lesions  Pulmonary      Respiratory effort: No increased work of breathing or signs of respiratory distress  Auscultation of lungs: Clear to auscultation  Cardiovascular      Auscultation of heart: Abnormal   The rhythm was regular with premature beats  Examination of extremities for edema and/or varicosities: Normal        Carotid pulses: Normal        Abdomen      Abdomen: Non-tender, no masses  Liver and spleen: No hepatomegaly or splenomegaly  Lymphatic      Palpation of lymph nodes in neck: No lymphadenopathy  Musculoskeletal      Gait and station: Abnormal  -- antalgic and uses a cane  Digits and nails: Normal without clubbing or cyanosis  Skin      Skin and subcutaneous tissue: Normal without rashes or lesions  Neurologic      Cranial nerves: Cranial nerves 2-12 intact  Psychiatric      Orientation to person, place, and time: Normal        Mood and affect: Normal        Diabetic Foot Screen: Normal        Health Management   Diabetes mellitus, type 2   *VB - Eye Exam; every 2 years; Last 26Apr2016; Next Due: 97BAY4993; Active  *VB - Foot Exam; every 1 year;  Last 34FKD7214; Next Due: 53DDY3984; Near Due  History of colon polyps   COLONOSCOPY (GI, SURG); every 5 years; Last 20Kcg8708; Next Due: 59SQA5644; Active  History of Encounter for screening colonoscopy   COLONOSCOPY; every 10 years; Last 20Nwe1825; Next Due: 31EWJ6974; Overdue  History of Encounter for screening for osteoporosis   * Dexa Scan Axial Skel (Hip, Pelvis, Spine); every 2 years; Next Due: 25JKZ2301; Active  Pap smear for cervical cancer screening   (every) THINPREP PAP; every 3 years; Next Due: 07DKG0382; Overdue  Visit for screening mammogram   Digital Bilateral Screening Mammogram With CAD; every 1 year; Last 94VJB5769; Next Due:    13NJI2362;  Overdue    Signatures    Electronically signed by : BETSEY Bolivar ; Dec 20 2017 11:39AM EST                       (Author)

## 2018-01-09 ENCOUNTER — ALLSCRIPTS OFFICE VISIT (OUTPATIENT)
Dept: OTHER | Facility: OTHER | Age: 63
End: 2018-01-09

## 2018-01-09 NOTE — PSYCH
1  Major depressive disorder, recurrent episode, moderate (296 32) (F33 1)      Date of Initial Treatment Plan: 2/7/17  Date of Current Treatment Plan: 2/7/17  Treatment Plan 1  Strengths/Personal Resources for Self Care: When I am with people I fit right in with the group  I have a sense of humor  Diagnosis:   Axis I: F33 1   Axis II: deferred       Long Term Goals:   # 1 I do not want to feel hurt when I 'm disappointed by someone's actions  Target Date: 6/7/17      #2 I want to find a way to feel loved even though the words, "I love you are said "   Target Date: 6/7/17         Short Term Objectives:   Goal 1:   A  I will understand that while it is ok to feel hurt, I want to also, be able to see these situations objectively  B  I want to strengthen my self-esteem  C  I want to identify people who have affected me in my life (pos  and/or neg  )  D  I will think (rational) before speak (reactive-taking it personally)  Target Date: 6/7/17      Goal 2:   A  I want to make sure I love myself/accept myself  B  I want to identify my strengths/assets/qualities  C  I will learn/use affirmations  D  I will understand that I am responsible for being effective in my communication, such as assertiveness (and am not responsible for how people will respond to me)  Target Date: 6/7/17          GOAL 1: Modality: Individual 2 x per month Target Date: 6/7/17       The person(s) responsible for carrying out the plan is Negrito Sierra  GOAL 2: Modality: Individual 2 x per month Target Date: 6/7/17       The person(s) responsible for carrying out the plan is Negrito Sierra  The first scheduled review date is 72  The expected length of service is ongoing  Level of functioning at initial assessment: 65  The highest level of functioning in the past year was unknown  The current level of functioning is 65             Patient Signature: _________________________________ Date/Time: ______________       Electronically signed by : Cheyenne Gamble, MSWLCSWMSW,SHANEL; Feb 7 2017  3:17PM EST                       (Author)

## 2018-01-10 NOTE — PSYCH
Behavioral Health Outpatient Intake    Referred By: DR Марина Green  Intake Questions: there are no developmental disabilities  the patient does not have a hearing impairment  the patient does not have an ICM or CTT  patient is not taking injectable psychiatric medications  Employment: The patient is not employed  at [employer] [place]  Emergency Contact Information:   Emergency Contact: Parth Oilvares   Phone Number: [phone]   Previous Psychiatric Treatment: She has not been previously seen by a psychiatrist     She has not been previously seen by a therapist     History: no  service  She has not had combat service  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 [employer]  Insurance Subscriber: [email or web address]   Primary Insurance: [employer]   Phone number: [phone]   ID number: [id number]   Group number: [id number]         Presenting Problem (in patient's words): FRUSTRATED ABOUT HEALTH ISSUES, DOESNT FEEL SHE IS LOVED THE WAY SHE SHOULD BE  Substance Abuse: NONE  Previous Treatment: The patient has not been seen here in the past      Accepted as Patient   [address] 2/1/17 @ 3:00     Primary Care Physician: DR Марина Green       Signatures   Electronically signed by : Navarro Matson, ; Feb 1 2017 11:57AM EST                       (Author)

## 2018-01-11 NOTE — PSYCH
Progress Note  Psychotherapy Provided [facility]: Individual Psychotherapy 45 mins  minutes provided today  Goals addressed in session:   (G#2 )"I can't help him (her ) outside (yard work), and it gets to me " He does everything to make my life easier (ex , moved her car to have a more even surface for her to walk on" )  She acknowledges that since being discharged from outpt  phys  ther  (during mid March), has not followed up with the regimen at home  states was motivated to participate in outpt  phys  ther ; "To be able to stand longer, standing without pain and have min  or no pain when walking greater distances  " "I want to be able to walk better  " I'm lazy  It hurts when you do it " discussed the importance of her better understanding herself regarding her self-acknowledged resistance to doing things that could benefit her; discussed the importance of her knowing who made a difference (pos  and /or neg ) in her lilfe; discussed the importance of knowing one's qualities/strengths/assets (handouts); A: presents as curious to learn about herself and became tearful a couple of times as she learned about/confirmed some of the previous negative influences in her life; She wants to continue to grow in her hlife  P: (G#2) will complete the strengths/qualities/assets handout;   Pain Scale and Suicide Risk [facility]: On a scale of 0 to 10, the patient rates current pain at 0   Results/Data  PHQ-9 Adult Depression Screening 13Apr2017 03:14PM [place] [facility]     Test Name Result Flag Reference   PHQ-9 Adult Depression Score 7     Over the last two weeks, how often have you been bothered by any of the following problems?   Little interest or pleasure in doing things: Not at all - 0  Feeling down, depressed, or hopeless: More than half the days - 2  Trouble falling or staying asleep, or sleeping too much: More than half the days - 2  Feeling tired or having little energy: Not at all - 0  Poor appetite or over eating: Not at all - 0  Feeling bad about yourself - or that you are a failure or have let yourself or your family down: Nearly every day - 3  Trouble concentrating on things, such as reading the newspaper or watching television: Not at all - 0  Moving or speaking so slowly that other people could have noticed  Or the opposite -  being so fidgety or restless that you have been moving around a lot more than usual: Not at all - 0  Thoughts that you would be better off dead, or of hurting yourself in some way: Not at all - 0   PHQ-9 Adult Depression Screening Negative     PHQ-9 Difficulty Level Somewhat difficult     PHQ-9 Severity Mild Depression       PHQ-9 Adult Depression Screening 23Poe3873 03:14PM [facility] [facility]     Test Name Result Flag Reference   PHQ-9 Adult Depression Score 7     Over the last two weeks, how often have you been bothered by any of the following problems? Little interest or pleasure in doing things: Not at all - 0  Feeling down, depressed, or hopeless: More than half the days - 2  Trouble falling or staying asleep, or sleeping too much: More than half the days - 2  Feeling tired or having little energy: Not at all - 0  Poor appetite or over eating: Not at all - 0  Feeling bad about yourself - or that you are a failure or have let yourself or your family down: Nearly every day - 3  Trouble concentrating on things, such as reading the newspaper or watching television: Not at all - 0  Moving or speaking so slowly that other people could have noticed   Or the opposite -  being so fidgety or restless that you have been moving around a lot more than usual: Not at all - 0  Thoughts that you would be better off dead, or of hurting yourself in some way: Not at all - 0   PHQ-9 Adult Depression Screening Negative     PHQ-9 Difficulty Level Somewhat difficult     PHQ-9 Severity Mild Depression         Signatures   Electronically signed by : Te Sterling MSWLCSWSHANEL CARLSON; Apr 13 2017  5:55PM EST                       (Author)

## 2018-01-12 NOTE — RESULT NOTES
Verified Results  (1) HEMOGLOBIN A1C 24EQQ5103 10:58AM Yael Stone     Test Name Result Flag Reference   HEMOGLOBIN A1C 7 3 % H 4 0-5 6   EST  AVG   GLUCOSE 163 mg/dl     5 7-6 4% impaired fasting glucose  >=6 5% diagnosis of diabetes    Falsely low levels are seen in conditions linked to short RBC life span-  hemolytic anemia, and splenomegaly  Falsely elevated levels are seen in situations where there is an increased production of RBC- receipt of erythropoietin or blood transfusions  Adopted from ADA-Clinical Practice Recommendations

## 2018-01-12 NOTE — RESULT NOTES
Discussion/Summary   colon biopsy was benign  repeat colonoscopy in 5 years  Verified Results  (1) TISSUE EXAM 06Snh9486 09:47AM Lewis Garibay     Test Name Result Flag Reference   LAB AP CASE REPORT (Report)     Surgical Pathology Report             Case: [id number]                   Authorizing Provider: Sam Colin MD       Collected:      08/03/2017 0947        Ordering Location:   [facility]    Received:      08/03/2017 [address] Endoscopy                              Pathologist:      Zackery De La Cruz MD                                 Specimen:  Large Intestine, Sigmoid Colon, Polyp   LAB AP FINAL DIAGNOSIS      A  Sigmoid colon polyp (biopsy):    - Polypoid colonic mucosa with mild surface hyperplasia  - No high-grade dysplasia or malignancy identified  Electronically signed by Zackery De La Cruz MD on 8/8/2017 at 1:04 PM   LAB AP NOTE      Interpretation performed at [facility], [address] [place] [address], [address] [place] [address]  LAB AP SURGICAL ADDITIONAL INFORMATION (Report)     All controls performed with the immunohistochemical stains reported above   reacted appropriately  These tests were developed and their performance   characteristics determined by [facility] or   [address] [place] [address]  They may not be cleared or approved by the [place]  Food and Drug Administration  The FDA has determined that such clearance   or approval is not necessary  These tests are used for clinical purposes  They should not be regarded as investigational or for research  This   laboratory has been approved by [place] [address], designated as a high-complexity   laboratory and is qualified to perform these tests  LAB AP GROSS DESCRIPTION (Report)     A  The specimen is received in formalin, labeled with the patient's name   and hospital number, and is designated sigmoid colon polyp, is a single   irregularly shaped fragment of tan soft tissue measuring 0 3 cm in   greatest dimension  Entirely submitted  One cassette  Note: The estimated total formalin fixation time based upon information   provided by the submitting clinician and the standard processing schedule   is 16 75 hours        RLR   LAB AP CLINICAL INFORMATION      Encounter for screening for malignant neoplasm of colon

## 2018-01-13 VITALS
WEIGHT: 293 LBS | HEIGHT: 66 IN | SYSTOLIC BLOOD PRESSURE: 150 MMHG | RESPIRATION RATE: 18 BRPM | BODY MASS INDEX: 47.09 KG/M2 | DIASTOLIC BLOOD PRESSURE: 82 MMHG | HEART RATE: 100 BPM

## 2018-01-13 NOTE — RESULT NOTES
Verified Results  (1) COMPREHENSIVE METABOLIC PANEL 38UQR1286 53:79RZ Conchis Velasco     Test Name Result Flag Reference   GLUCOSE,RANDM 146 mg/dL H    If the patient is fasting, the ADA then defines impaired fasting glucose as > 100 mg/dL and diabetes as > or equal to 123 mg/dL  SODIUM 139 mmol/L  136-145   POTASSIUM 4 6 mmol/L  3 5-5 3   CHLORIDE 105 mmol/L  100-108   CARBON DIOXIDE 23 mmol/L  21-32   ANION GAP (CALC) 11 mmol/L  4-13   BLOOD UREA NITROGEN 12 mg/dL  5-25   CREATININE 0 87 mg/dL  0 60-1 30   Standardized to IDMS reference method   CALCIUM 9 7 mg/dL  8 3-10 1   BILI, TOTAL 0 41 mg/dL  0 20-1 00   ALK PHOSPHATAS 69 U/L     ALT (SGPT) 41 U/L  12-78   AST(SGOT) 27 U/L  5-45   ALBUMIN 4 1 g/dL  3 5-5 0   TOTAL PROTEIN 7 7 g/dL  6 4-8 2   eGFR Non-African American      >60 0 ml/min/1 [address] [employer] Disease Education Program recommendations are as follows:  GFR calculation is accurate only with a steady state creatinine  Chronic Kidney disease less than 60 ml/min/1 73 sq  meters  Kidney failure less than 15 ml/min/1 73 sq  meters  (1) VITAMIN D 25-HYDROXY 41AJB4431 10:58AM Conchis Velasco     Test Name Result Flag Reference   VIT D 25-HYDROX 32 5 ng/mL  30 0-100 0     * MAMMO SCREENING BILATERAL W CAD 19TXW7028 10:06AM Conchis Velasco     Test Name Result Flag Reference   MAMMO SCREENING BILATERAL W CAD (Report)     Patient History:   Patient is postmenopausal    No known family history of cancer  Patient has never smoked  Patient's BMI is 53 1  Reason for exam: screening (asymptomatic)  Mammo Screening Bilateral W CAD: May 4, 2016 - Check In #:    [de-identified]   Bilateral MLO and CC view(s) were taken  Technologist: RONI Adames (RONI)(M)   Prior study comparison: March 11, 2015, digital bilateral    screening mammogram performed at [address] [place] [address]  There are scattered fibroglandular densities       No dominant soft tissue mass, architectural distortion or    suspicious calcifications are noted in either breast   The skin    and nipple structures are within normal limits  Scattered benign   appearing calcifications are noted  No significant changes when compared with prior studies  ASSESSMENT: BiRad:2 - Benign     Recommendation:   Routine screening mammogram of both breasts in 1 year  A    reminder letter will be scheduled  Analyzed by CAD     8-10% of cancers will be missed on mammography  Management of a    palpable abnormality must be based on clinical grounds  Patients   will be notified of their results via letter from our facility  Accredited by [employer] of Radiology and FDA       Transcription [facility]  [facility]: [id number]     Risk Value(s):   Tyrer-Cuzick 10 Year: 3 185%, Tyrer-Cuzick Lifetime: 7 953%,    Myriad Table: 1 5%, JESSE 5 Year: 1 6%, NCI Lifetime: 8 1%

## 2018-01-13 NOTE — PSYCH
Progress Note  Psychotherapy Provided [facility]: Individual Psychotherapy 45 mins  minutes provided today  Goals addressed in session:   (G# 1 )  is paying closer attention to taking care of herself: d/c fast foods, d/c use of artificial sweeteners, d/c breads, increase in H 2 0;  is on a "cleansing" regimen "by using natural ingredients;" "I have lost 16 lbs  within the past month " She shared some experiences she reports has had with her  in her efforts to be more effective in her communication  She states she has two concerns about his reportedly being disrespectful to her that she plans to discuss with him  discussed/reviewed effective communication strategies including timing  discussed pos /realistic self talk messages (including use of quotes) as helpful in rebuilding self-esteem/self-confidence (handout); A: presents as and confirms she is beginning to feel better about herself including her reported progress with her weight loss; She seems less hesitant in her desire to share her concerns with her , regarding their relationship; P: (G# 1) will practice using the two selected quotes as part of her ongoing effort to feel better about herself;   Pain Scale and Suicide Risk [facility]: On a scale of 0 to 10, the patient rates current pain at 0   Current suicide risk is low   Behavioral Health Treatment Plan [facility]: Diagnosis and Treatment Plan explained to patient, patient relates understanding diagnosis and is agreeable to Treatment Plan  Results/Data  PHQ-9 Adult Depression Screening [id number] 12:02PM [facility]     Test Name Result Flag Reference   PHQ-9 Adult Depression Score 1     Over the last two weeks, how often have you been bothered by any of the following problems?   Little interest or pleasure in doing things: Not at all - 0  Feeling down, depressed, or hopeless: Not at all - 0  Trouble falling or staying asleep, or sleeping too much: Several days - 1  Feeling tired or having little energy: Not at all - 0  Poor appetite or over eating: Not at all - 0  Feeling bad about yourself - or that you are a failure or have let yourself or your family down: Not at all - 0  Trouble concentrating on things, such as reading the newspaper or watching television: Not at all - 0  Moving or speaking so slowly that other people could have noticed  Or the opposite -  being so fidgety or restless that you have been moving around a lot more than usual: Not at all - 0  Thoughts that you would be better off dead, or of hurting yourself in some way: Not at all - 0   PHQ-9 Adult Depression Screening Negative     PHQ-9 Difficulty Level Not difficult at all     PHQ-9 Severity Minimal Depression       PHQ-9 Adult Depression Screening 29Jun2017 12:02PM Anne Marie Ferrer     Test Name Result Flag Reference   PHQ-9 Adult Depression Score 1     Over the last two weeks, how often have you been bothered by any of the following problems? Little interest or pleasure in doing things: Not at all - 0  Feeling down, depressed, or hopeless: Not at all - 0  Trouble falling or staying asleep, or sleeping too much: Several days - 1  Feeling tired or having little energy: Not at all - 0  Poor appetite or over eating: Not at all - 0  Feeling bad about yourself - or that you are a failure or have let yourself or your family down: Not at all - 0  Trouble concentrating on things, such as reading the newspaper or watching television: Not at all - 0  Moving or speaking so slowly that other people could have noticed  Or the opposite -  being so fidgety or restless that you have been moving around a lot more than usual: Not at all - 0  Thoughts that you would be better off dead, or of hurting yourself in some way: Not at all - 0   PHQ-9 Adult Depression Screening Negative     PHQ-9 Difficulty Level Not difficult at all     PHQ-9 Severity Minimal Depression         Assessment    1   Major depressive disorder, recurrent episode, moderate (296 32) (F33 1)    Signatures   Electronically signed by : ROBYN WilkinsonLCSWROBYN,SHANEL; Jun 29 2017  1:13PM EST                       (Author)

## 2018-01-13 NOTE — PSYCH
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[place], [place] [address]; She reports wants to feel loved and less sensitive to how she responds to other and how they respond to her  She expressed concern about what she describes as a lack of appreciation on the part of her adult children regarding what she states she chooses to do for them (ex , babysit, cook them dinners)  When asked she did acknowledge she and her  "gave them everything we did not have "discussed self-esteem and the relationship to degree of sensitivity to others as well as to the degree to which one focuses on self-care; A: presents as open to this learning experience and smiled and laughed during the session commenting on her sense of humor as a positive in her life; P: (G#1 ) will identify her qualities/strengths/assets (handout); Pain Scale and Suicide Risk [facility]: On a scale of 0 to 10, the patient rates current pain at 0   Current suicide risk is low   Behavioral Health Treatment Plan [address] [place] [address] [address]: Diagnosis and Treatment Plan explained to patient, patient relates understanding diagnosis and is agreeable to Treatment Plan  Assessment    1   Major depressive disorder, recurrent episode, moderate (296 32) (F33 1)    Signatures   Electronically signed by : ALEKSANDER Samson,SHANEL; Feb 7 2017  3:42PM EST                       (Author)

## 2018-01-13 NOTE — PSYCH
Progress Note  Psychotherapy Provided [facility]: Individual Psychotherapy 45 mins  minutes provided today  Goals addressed in session:   (G# 1 ) "My anxiety is through the roof " "He (her ) won't admit/acknowledge when he is wrong " She described her  as having always been critical of her  "I can't believe the mean things he says to me " continued discussion on assertiveness, conflict resolution strategies, self-esteem, the effective use of anger and active listening; A: presents as upset when her  allegedly is insensitive and disrespectful of her; She is working on adjusting how she regards herself (expectations of herself) and, therefore, how she chooses to respond (more effectively) to others  P: (G# 1) will continue to learn positive self-regarding and to utilize effective communication strategies in her relationships with others including her ;   Pain Scale and Suicide Risk  [facility]: On a scale of 0 to 10, the patient rates current pain at 0   Current suicide risk is low   Behavioral Health Treatment Plan [facility]: Diagnosis and Treatment Plan explained to patient, patient relates understanding diagnosis and is agreeable to Treatment Plan  Results/Data  PHQ-9 Adult Depression Screening 20Apr2017 12:04PM Natasha Phelps     Test Name Result Flag Reference   PHQ-9 Adult Depression Score 7     Over the last two weeks, how often have you been bothered by any of the following problems?   Little interest or pleasure in doing things: Several days - 1  Feeling down, depressed, or hopeless: Several days - 1  Trouble falling or staying asleep, or sleeping too much: Not at all - 0  Feeling tired or having little energy: Several days - 1  Poor appetite or over eating: Not at all - 0  Feeling bad about yourself - or that you are a failure or have let yourself or your family down: Nearly every day - 3  Trouble concentrating on things, such as reading the newspaper or watching television: Not at all - 0  Moving or speaking so slowly that other people could have noticed  Or the opposite -  being so fidgety or restless that you have been moving around a lot more than usual: Not at all - 0  Thoughts that you would be better off dead, or of hurting yourself in some way: Several days - 1   PHQ-9 Adult Depression Screening Negative     PHQ-9 Difficulty Level Somewhat difficult     PHQ-9 Severity Mild Depression       PHQ-9 Adult Depression Screening [id number] 12:04PM Lilliam Patterson     Test Name Result Flag Reference   PHQ-9 Adult Depression Score 7     Over the last two weeks, how often have you been bothered by any of the following problems? Little interest or pleasure in doing things: Several days - 1  Feeling down, depressed, or hopeless: Several days - 1  Trouble falling or staying asleep, or sleeping too much: Not at all - 0  Feeling tired or having little energy: Several days - 1  Poor appetite or over eating: Not at all - 0  Feeling bad about yourself - or that you are a failure or have let yourself or your family down: Nearly every day - 3  Trouble concentrating on things, such as reading the newspaper or watching television: Not at all - 0  Moving or speaking so slowly that other people could have noticed  Or the opposite -  being so fidgety or restless that you have been moving around a lot more than usual: Not at all - 0  Thoughts that you would be better off dead, or of hurting yourself in some way: Several days - 1   PHQ-9 Adult Depression Screening Negative     PHQ-9 Difficulty Level Somewhat difficult     PHQ-9 Severity Mild Depression         Assessment    1   Major depressive disorder, recurrent episode, moderate (296 32) (F33 1)    Signatures   Electronically signed by : RENEE TuckerSWROBYN,SHANEL; Apr 20 2017 12:05PM EST                       (Author)

## 2018-01-14 VITALS
HEIGHT: 66 IN | BODY MASS INDEX: 47.09 KG/M2 | OXYGEN SATURATION: 96 % | DIASTOLIC BLOOD PRESSURE: 78 MMHG | HEART RATE: 178 BPM | SYSTOLIC BLOOD PRESSURE: 117 MMHG | TEMPERATURE: 98.6 F | RESPIRATION RATE: 16 BRPM | WEIGHT: 293 LBS

## 2018-01-14 VITALS
RESPIRATION RATE: 18 BRPM | BODY MASS INDEX: 47.09 KG/M2 | WEIGHT: 293 LBS | SYSTOLIC BLOOD PRESSURE: 138 MMHG | HEIGHT: 66 IN | DIASTOLIC BLOOD PRESSURE: 90 MMHG | HEART RATE: 100 BPM

## 2018-01-14 VITALS
SYSTOLIC BLOOD PRESSURE: 142 MMHG | HEIGHT: 66 IN | HEART RATE: 100 BPM | WEIGHT: 293 LBS | DIASTOLIC BLOOD PRESSURE: 90 MMHG | BODY MASS INDEX: 47.09 KG/M2

## 2018-01-14 VITALS
RESPIRATION RATE: 20 BRPM | DIASTOLIC BLOOD PRESSURE: 90 MMHG | SYSTOLIC BLOOD PRESSURE: 150 MMHG | HEART RATE: 100 BPM | BODY MASS INDEX: 47.09 KG/M2 | HEIGHT: 66 IN | WEIGHT: 293 LBS | TEMPERATURE: 98.2 F

## 2018-01-14 NOTE — PSYCH
Progress Note  Psychotherapy Provided [facility]: Individual Psychotherapy 45 mins  minutes provided today  Goals addressed in session:   (G# 1 ) states her PCP is referred her to a gastroenterologist with an appt  on June 7, 2107; "He thinks I have IBS  My AI C was great as well reported good news re her HTN  reviewing some of her identified qualities she observed, they "pertain to others" as opposed to if she would have identified "attractive" which would be more directed to her "self;" "I did n''t want to be conceited and say personal things about myself " She shared one or two more recent incidents regarding interactions between her and her  one of which she reports involved her "holding my ground (regarding her opinion about a situation that was different from his);discussed her efforts to be more mindful of her asserting herself with others, most notably, her ; A: confirms she believes she is making progress in her asserting herself; She did observe her identified strengths involve others and none pertain to her "self;" "I don't want to be become conceited " P: (G# 1 ) will continue to pay less attention to what others think and more about my own thoughts/feelings;   Pain Scale and Suicide Risk [facility]: On a scale of 0 to 10, the patient rates current pain at 0   Current suicide risk is low   Behavioral Health Treatment Plan [facility]: Diagnosis and Treatment Plan explained to patient, patient relates understanding diagnosis and is agreeable to Treatment Plan  Results/Data  PHQ-9 Adult Depression Screening [id number] 01:52PM Christopher Jackson     Test Name Result Flag Reference   PHQ-9 Adult Depression Score 6     Over the last two weeks, how often have you been bothered by any of the following problems?   Little interest or pleasure in doing things: Several days - 1  Feeling down, depressed, or hopeless: More than half the days - 2  Trouble falling or staying asleep, or sleeping too much: Not at all - 0  Feeling tired or having little energy: Several days - 1  Poor appetite or over eating: Not at all - 0  Feeling bad about yourself - or that you are a failure or have let yourself or your family down: More than half the days - 2  Trouble concentrating on things, such as reading the newspaper or watching television: Not at all - 0  Moving or speaking so slowly that other people could have noticed  Or the opposite -  being so fidgety or restless that you have been moving around a lot more than usual: Not at all - 0  Thoughts that you would be better off dead, or of hurting yourself in some way: Not at all - 0   PHQ-9 Adult Depression Screening Negative     PHQ-9 Difficulty Level Somewhat difficult     PHQ-9 Severity Mild Depression       PHQ-9 Adult Depression Screening [id number] 01:52PM Bharath Horne     Test Name Result Flag Reference   PHQ-9 Adult Depression Score 6     Over the last two weeks, how often have you been bothered by any of the following problems? Little interest or pleasure in doing things: Several days - 1  Feeling down, depressed, or hopeless: More than half the days - 2  Trouble falling or staying asleep, or sleeping too much: Not at all - 0  Feeling tired or having little energy: Several days - 1  Poor appetite or over eating: Not at all - 0  Feeling bad about yourself - or that you are a failure or have let yourself or your family down: More than half the days - 2  Trouble concentrating on things, such as reading the newspaper or watching television: Not at all - 0  Moving or speaking so slowly that other people could have noticed   Or the opposite -  being so fidgety or restless that you have been moving around a lot more than usual: Not at all - 0  Thoughts that you would be better off dead, or of hurting yourself in some way: Not at all - 0   PHQ-9 Adult Depression Screening Negative     PHQ-9 Difficulty Level Somewhat difficult     PHQ-9 Severity Mild Depression         Assessment    1   Major depressive disorder, recurrent episode, moderate (296 32) (F33 1)    Signatures   Electronically signed by : Marco Antonio Haque, ROBYNLCSWROBYN,SHANEL; Apr 27 2017  2:14PM EST                       (Author)
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
## 2018-01-15 NOTE — PSYCH
Progress Note  Psychotherapy Provided [facility]: Individual Psychotherapy 45 mins  minutes provided today  Goals addressed in session:   (G# 1")  had a good time" with her grandchildren this past week-end including having her 9 month old grandson sleep at her place for the first time  Niru Mota "We (she and her ) can joke around a little " When asking about what she wants to do for herself (ex, beyond her roles of wife, mother and grandmother), she began talking about "we (her  and her)  "  is not as mobile and, thus, cannot go hiking and engage in other more physical activities due to her having to wear a brace on her R foot;   has had flat feet and working on her feet for a number of years resulted in her feet "collapsing and my ankles turned inward;"  had L foot surgery in 2011 ("ankle reconstruction") on L foot adding the recovery was "fine;"  had ankle reconstruction n 2012 on the R ankle ("no wt  nearing for five months") and states the recovery was "horrible;" reports she, then, had L hip surgery in 2013; states her R ankle collapsed for a second time and is currently wearing a brace;  is having "charcoal shots" to "tighten the urethra, to keep the urine in;" states the medication was ineffective for one week (and alleges was expensive);  has plans to address the bladder situation via her scheduling appt ; discussed the importance of her establishing some time for her "self" which she mildly acknowledged; A: presents as not as comfortable with the idea of doing something for herself; She states she and her  are communicating a little better  "He is being more considerate of me " P: (G#1) will continue to give some thought to doing something in which she is solely interested ("me" time); Pain Scale and Suicide Risk [facility]: On a scale of 0 to 10, the patient rates current pain at 0   Current suicide risk is low      [facility]: Diagnosis and Treatment Plan explained to patient, patient relates understanding diagnosis and is agreeable to Treatment Plan  Results/Data  PHQ-9 Adult Depression Screening 19Jun2017 01:12PM Ld Peñaloza     Test Name Result Flag Reference   PHQ-9 Adult Depression Score 0     Over the last two weeks, how often have you been bothered by any of the following problems? Little interest or pleasure in doing things: Not at all - 0  Feeling down, depressed, or hopeless: Not at all - 0  Trouble falling or staying asleep, or sleeping too much: Not at all - 0  Feeling tired or having little energy: Not at all - 0  Poor appetite or over eating: Not at all - 0  Feeling bad about yourself - or that you are a failure or have let yourself or your family down: Not at all - 0  Trouble concentrating on things, such as reading the newspaper or watching television: Not at all - 0  Moving or speaking so slowly that other people could have noticed  Or the opposite -  being so fidgety or restless that you have been moving around a lot more than usual: Not at all - 0  Thoughts that you would be better off dead, or of hurting yourself in some way: Not at all - 0   PHQ-9 Adult Depression Screening Negative     PHQ-9 Difficulty Level Not difficult at all     PHQ-9 Severity No Depression       PHQ-9 Adult Depression Screening 19Jun2017 01:12PM Ld Peñaloza     Test Name Result Flag Reference   PHQ-9 Adult Depression Score 0     Over the last two weeks, how often have you been bothered by any of the following problems?   Little interest or pleasure in doing things: Not at all - 0  Feeling down, depressed, or hopeless: Not at all - 0  Trouble falling or staying asleep, or sleeping too much: Not at all - 0  Feeling tired or having little energy: Not at all - 0  Poor appetite or over eating: Not at all - 0  Feeling bad about yourself - or that you are a failure or have let yourself or your family down: Not at all - 0  Trouble concentrating on things, such as reading the newspaper or watching television: Not at all - 0  Moving or speaking so slowly that other people could have noticed  Or the opposite -  being so fidgety or restless that you have been moving around a lot more than usual: Not at all - 0  Thoughts that you would be better off dead, or of hurting yourself in some way: Not at all - 0   PHQ-9 Adult Depression Screening Negative     PHQ-9 Difficulty Level Not difficult at all     PHQ-9 Severity No Depression         Assessment    1   Major depressive disorder, recurrent episode, moderate (296 32) (F33 1)    Signatures   Electronically signed by : Marco Antonio Haque, MSWLCSWMSBAUTISTA,SHANEL; Jun 19 2017  4:32PM EST                       (Author)

## 2018-01-15 NOTE — RESULT NOTES
Verified Results  (1) HEMOGLOBIN A1C [id number] 10:27AM Yael Ruiz Order Number: [id number]     Test Name Result Flag Reference   HEMOGLOBIN A1C 7 4 % H 4 2-6 3   EST  AVG  GLUCOSE 166 mg/dl       (1) COMPREHENSIVE METABOLIC PANEL 95HLG2145 21:36TO Carrie Dahl Order Number: [id number]     Test Name Result Flag Reference   GLUCOSE,RANDM 160 mg/dL H    If the patient is fasting, the ADA then defines impaired fasting glucose as > 100 mg/dL and diabetes as > or equal to 123 mg/dL  SODIUM 139 mmol/L  136-145   POTASSIUM 4 4 mmol/L  3 5-5 3   CHLORIDE 104 mmol/L  100-108   CARBON DIOXIDE 28 mmol/L  21-32   ANION GAP (CALC) 7 mmol/L  4-13   BLOOD UREA NITROGEN 9 mg/dL  5-25   CREATININE 0 64 mg/dL  0 60-1 30   Standardized to IDMS reference method   CALCIUM 9 3 mg/dL  8 3-10 1   BILI, TOTAL 0 45 mg/dL  0 20-1 00   ALK PHOSPHATAS 71 U/L     ALT (SGPT) 44 U/L  12-78   AST(SGOT) 38 U/L  5-45   ALBUMIN 3 6 g/dL  3 5-5 0   TOTAL PROTEIN 7 4 g/dL  6 4-8 2   eGFR 96 ml/min/1 73sq m     [facility] Disease Education Program recommendations are as follows:  GFR calculation is accurate only with a steady state creatinine  Chronic Kidney disease less than 60 ml/min/1 73 sq  meters  Kidney failure less than 15 ml/min/1 73 sq  meters       (1) HEP C ANTIBODY 42Aeo0305 10:27AM Carrie Dahl Order Number: [id number]     Test Name Result Flag Reference   HEPATITIS C ANTIBODY Non-reactive  Non-reactive

## 2018-01-15 NOTE — RESULT NOTES
Verified Results  * MAMMO SCREENING BILATERAL W CAD 73QTJ9553 10:35AM Azucena Mera Order Number: [id number]    - Patient Instructions: To schedule this appointment, please contact Central Scheduling at [phone]  Do not wear any perfume, powder, lotion or deodorant on breast or underarm area  Please bring your doctors order, referral (if needed) and insurance information with you on the day of the test  Failure to bring this information may result in this test being rescheduled  Arrive 15 minutes prior to your appointment time to register  On the day of your test, please bring any prior mammogram or breast studies with you that were not performed at a [facility]  Failure to bring prior exams may result in your test needing to be rescheduled  Test Name Result Flag Reference   MAMMO SCREENING BILATERAL W CAD (Report)     Patient History:   Patient is postmenopausal    No known family history of cancer  Patient has never smoked  Patient's BMI is 53 1  Reason for exam: screening, asymptomatic  Mammo Screening Bilateral W CAD: May 5, 2017 - Check In #:    [de-identified]   Bilateral CC and MLO view(s) were taken  Technologist: RT EUNICE Bergman   Prior study comparison: May 4, 2016, mammo screening bilateral W    CAD performed at [address] [place] [address]  March 11, 2015,    digital bilateral screening mammogram performed at [address] [place] [address]  There are scattered fibroglandular densities  No dominant soft tissue mass, architectural distortion or    suspicious calcifications are noted in either breast   The skin    and nipple structures are within normal limits  Scattered benign   appearing calcifications are noted  No evidence of malignancy  No significant changes when compared with prior studies  ACR BI-RADSï¾® Assessments: BiRad:2 - Benign     Recommendation:   Routine screening mammogram of both breasts in 1 year   A    reminder letter will be scheduled  Analyzed by CAD     8-10% of cancers will be missed on mammography  Management of a    palpable abnormality must be based on clinical grounds  Patients   will be notified of their results via letter from our facility  Accredited by [employer] of Radiology and FDA       Transcription Location: RONI Anderson 98: [id number]     Risk Value(s):   Eddieer-C[place] 10 Year: 3 200%, Tyrer-Cuchidick Lifetime: 7 600%,    Myriad Table: 1 5%, JESSE 5 Year: 1 6%, NCI Lifetime: 7 9%   Signed by:   Heather Fairbanks MD   5/5/17

## 2018-01-16 NOTE — PSYCH
Progress Note  Psychotherapy Provided [facility]: Individual Psychotherapy 45 5mins  minutes provided today  Goals addressed in session:   (G#2 ) complained of anxiety symptoms which she states are occurring within the past two weeks: "feeling nervous, anxious or on edge;" She states "goes over and over things, every day things (what will I make for dinner, when will I go grocery shopping, what will I do with the grandchildren) " She noted they both went to dinner and noted they were having a good time  She noted when she decided to ask him one thing he likes about her, she noted his alleged response was "Who told you to say that?"discussed as she begins to assert herself (express her ideas and ask questions) she will be encountering differing opinions ideas with others including her ; discussed the importance of learning/improving upon her communication skills including conflict resolution; A: She noted while her  challenged "independence" on two occasions during the past several weeks (discussed the particulars during this session), she chose to not argue and "just passed it off " She did not present as "wounded" by these two experiences  P:(G#2) will continue to assert herself and will understand that differences between her and her  will begin to emerge to be addressed via conflict resolution;   Pain Scale and Suicide Risk [facility]: On a scale of 0 to 10, the patient rates current pain at 0   Current suicide risk is low   Behavioral Health Treatment Plan [facility]: Diagnosis and Treatment Plan explained to patient, patient relates understanding diagnosis and is agreeable to Treatment Plan  Results/Data  PHQ-9 Adult Depression Screening [id number] 02:13PM Ld Peñaloza     Test Name Result Flag Reference   PHQ-9 Adult Depression Score 8     Over the last two weeks, how often have you been bothered by any of the following problems?   Little interest or pleasure in doing things: Not at all - 0  Feeling down, depressed, or hopeless: Several days - 1  Trouble falling or staying asleep, or sleeping too much: Nearly every day - 3  Feeling tired or having little energy: Several days - 1  Poor appetite or over eating: Not at all - 0  Feeling bad about yourself - or that you are a failure or have let yourself or your family down: Nearly every day - 3  Trouble concentrating on things, such as reading the newspaper or watching television: Not at all - 0  Moving or speaking so slowly that other people could have noticed  Or the opposite -  being so fidgety or restless that you have been moving around a lot more than usual: Not at all - 0  Thoughts that you would be better off dead, or of hurting yourself in some way: Not at all - 0   PHQ-9 Adult Depression Screening Negative     PHQ-9 Difficulty Level Somewhat difficult     PHQ-9 Severity Mild Depression       PHQ-9 Adult Depression Screening [id number] 02:13PM Francisco Sol     Test Name Result Flag Reference   PHQ-9 Adult Depression Score 8     Over the last two weeks, how often have you been bothered by any of the following problems? Little interest or pleasure in doing things: Not at all - 0  Feeling down, depressed, or hopeless: Several days - 1  Trouble falling or staying asleep, or sleeping too much: Nearly every day - 3  Feeling tired or having little energy: Several days - 1  Poor appetite or over eating: Not at all - 0  Feeling bad about yourself - or that you are a failure or have let yourself or your family down: Nearly every day - 3  Trouble concentrating on things, such as reading the newspaper or watching television: Not at all - 0  Moving or speaking so slowly that other people could have noticed   Or the opposite -  being so fidgety or restless that you have been moving around a lot more than usual: Not at all - 0  Thoughts that you would be better off dead, or of hurting yourself in some way: Not at all - 0 PHQ-9 Adult Depression Screening Negative     PHQ-9 Difficulty Level Somewhat difficult     PHQ-9 Severity Mild Depression         Assessment    1   Major depressive disorder, recurrent episode, moderate (296 32) (F33 1)    Signatures   Electronically signed by : ALEKSANDER Ellis LCSW; Mar 30 2017  3:52PM EST                       (Author)    Electronically signed by : ALEKSANDER Ellis LCSW; Mar 30 2017  3:53PM EST                       (Author)

## 2018-01-16 NOTE — PSYCH
Progress Note  Psychotherapy Provided [facility]: Individual Psychotherapy 45 mins  minutes provided today  Goals addressed in session:   (G# 1) states has had some intense conversations with her  regarding the reported amount of time he is spending at work; She reports her  has been staying at work until 7 PM and later more consistently which she states is interfering with family time (specifically their grandchildren)  "Instead of holding everything in, I learned to tell him what I am thinking " discussed/reviewed assertiveness, affirmations and conflict resolution strategies; A: presents as less tearful, less tentative in her expressing her ideas/feelings; She is becoming more confident in expressing her thoughts and feelings with her   P:(G#1) will continue in her efforts to not personalize when dealing with issues she believes need to be addressed;   Pain Scale and Suicide Risk [facility]: On a scale of 0 to 10, the patient rates current pain at 0   Current suicide risk is low   Behavioral Health Treatment Plan [facility]: Diagnosis and Treatment Plan explained to patient, patient relates understanding diagnosis and is agreeable to Treatment Plan  Assessment    1   Major depressive disorder, recurrent episode, moderate (296 32) (F33 1)    Signatures   Electronically signed by : Malena Salinas, ROBYNLCSWROBYN,SHANEL; Sep 21 2017  2:10PM EST                       (Author)

## 2018-01-16 NOTE — PSYCH
Progress Note  Psychotherapy Provided [facility]: Individual Psychotherapy 45 mins  minutes provided today  Goals addressed in session:   (G# 1) was recently diagnosed with fatty liver disease; "You have to watch your diet   fatty foods  Mame Mejias Mame Mejias I need to lose weight " "I am not allowed to drink alcohol "  was recently diagnosed with IBS by a gastroenterologist, Dr Aarti Johnson;  will have her first colonoscopy in the near future; "Today was a bad day for me  I was so glad I got called (from this practice with a counseling appt  opening for today)  " discussed the option of a nutritionist as part of wt loss mgt  to which she agreed;  is missing seeing her one grandson as 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 (with his getting out of school for the summer); When updating the treatment plan, she noted her progress with Goal # 1 (ex , provided an example of dealing with her 9 yr  old "grumpy grandson" yesterday)which will be removed from the plan; She noted progress on Goal # 2 regarding her concern about her feeling loved; "We (she and her ) had a major blow out on Memorial Day " She elaborated on the disagreement  She noted she held her position about not wanting to be talked to disrespectfully  He said, I love you " and now, and I believe it " She noted, "I listen more (pay closer attention when her  is talking to her)  " discussed relationships whether with family or others and the importance of her effectively handling her feelings when things do not go the way she had anticipated; discussed assertiveness to handle her emotions (ex , disappointment); discussed happiness as including her living an authentic life (ie , according to her values); A: presents as more upbeat and as feeling better about her relationship with her ; She wants to be mindful of her not overinvolving herself in her adult children's lives  P: (G#1 ) will begin to identify her values;    Pain Scale and Suicide Risk [facility]: On a scale of 0 to 10, the patient rates current pain at 0   Current suicide risk is low   Behavioral Health Treatment Plan [facility]: Diagnosis and Treatment Plan explained to patient, patient relates understanding diagnosis and is agreeable to Treatment Plan  Results/Data  PHQ-9 Adult Depression Screening 13Jun2017 02:37PM Jhoan Duran     Test Name Result Flag Reference   PHQ-9 Adult Depression Score 7     Over the last two weeks, how often have you been bothered by any of the following problems? Little interest or pleasure in doing things: Not at all - 0  Feeling down, depressed, or hopeless: More than half the days - 2  Trouble falling or staying asleep, or sleeping too much: More than half the days - 2  Feeling tired or having little energy: Not at all - 0  Poor appetite or over eating: Not at all - 0  Feeling bad about yourself - or that you are a failure or have let yourself or your family down: More than half the days - 2  Trouble concentrating on things, such as reading the newspaper or watching television: Not at all - 0  Moving or speaking so slowly that other people could have noticed  Or the opposite -  being so fidgety or restless that you have been moving around a lot more than usual: Not at all - 0  Thoughts that you would be better off dead, or of hurting yourself in some way: Several days - 1   PHQ-9 Adult Depression Screening Negative     PHQ-9 Difficulty Level Somewhat difficult     PHQ-9 Severity Mild Depression       PHQ-9 Adult Depression Screening 13Jun2017 02:37PM Jhoan Duran     Test Name Result Flag Reference   PHQ-9 Adult Depression Score 7     Over the last two weeks, how often have you been bothered by any of the following problems?   Little interest or pleasure in doing things: Not at all - 0  Feeling down, depressed, or hopeless: More than half the days - 2  Trouble falling or staying asleep, or sleeping too much: More than half the days - 2  Feeling tired or having little energy: Not at all - 0  Poor appetite or over eating: Not at all - 0  Feeling bad about yourself - or that you are a failure or have let yourself or your family down: More than half the days - 2  Trouble concentrating on things, such as reading the newspaper or watching television: Not at all - 0  Moving or speaking so slowly that other people could have noticed  Or the opposite -  being so fidgety or restless that you have been moving around a lot more than usual: Not at all - 0  Thoughts that you would be better off dead, or of hurting yourself in some way: Several days - 1   PHQ-9 Adult Depression Screening Negative     PHQ-9 Difficulty Level Somewhat difficult     PHQ-9 Severity Mild Depression         Assessment    1   Major depressive disorder, recurrent episode, moderate (296 32) (F33 1)    Signatures   Electronically signed by : ALEKSANDER Sorto LCSW; Jun 13 2017  5:37PM EST                       (Author)    Electronically signed by : ALEKSANDER Sorto LCSW; Jun 13 2017  6:21PM EST                       (Author)

## 2018-01-16 NOTE — PSYCH
Progress Note  Psychotherapy Provided [facility]: Individual Psychotherapy 45 mins  minutes provided today  Goals addressed in session:   (G#1 ) states her  accepted another position within the company; pros: get more sleep; work less hours (currently leaving home at 5: 30 AM and returning at 8 PM); With his new position he would no longer work Saturdays and work a day shift on Thursdays and Fridays; described his new position as "more prestigious;" "He is very happy regarding what he will be doing (with his new position)  " When discussing her progress, she noted, "It is ok to be depressed at times  (Now) I know how to get out of it " discussed her efforts to adjust her perspective (from all or nothing to options) and the impact such a change has had on her quality of life (ex , improved self-confidence); A: presents as more at ease with her "self" and less anxious about things; Will cancel her remaining appt  , per her request; She will call to schedule if/as needed; P: (G#1) will allow her case to remain open for 6 - 8 weeks and if no request for an appt  , will discharge her from counseling;   Pain Scale and Suicide Risk [facility]: On a scale of 0 to 10, the patient rates current pain at 0   Current suicide risk is low   Behavioral Health Treatment Plan [facility]: Diagnosis and Treatment Plan explained to patient, patient relates understanding diagnosis and is agreeable to Treatment Plan  Results/Data  PHQ-9 Adult Depression Screening 25Oct2017 02:19PM Jim Kelly     Test Name Result Flag Reference   PHQ-9 Adult Depression Score 1     Over the last two weeks, how often have you been bothered by any of the following problems?   Little interest or pleasure in doing things: Not at all - 0  Feeling down, depressed, or hopeless: Several days - 1  Trouble falling or staying asleep, or sleeping too much: Not at all - 0  Feeling tired or having little energy: Not at all - 0  Poor appetite or over eating: Not at all - 0  Feeling bad about yourself - or that you are a failure or have let yourself or your family down: Not at all - 0  Trouble concentrating on things, such as reading the newspaper or watching television: Not at all - 0  Moving or speaking so slowly that other people could have noticed  Or the opposite -  being so fidgety or restless that you have been moving around a lot more than usual: Not at all - 0  Thoughts that you would be better off dead, or of hurting yourself in some way: Not at all - 0   PHQ-9 Adult Depression Screening Negative     PHQ-9 Difficulty Level Not difficult at all     PHQ-9 Severity Minimal Depression       PHQ-9 Adult Depression Screening 25Oct2017 02:19PM Natasha Phelps     Test Name Result Flag Reference   PHQ-9 Adult Depression Score 1     Over the last two weeks, how often have you been bothered by any of the following problems? Little interest or pleasure in doing things: Not at all - 0  Feeling down, depressed, or hopeless: Several days - 1  Trouble falling or staying asleep, or sleeping too much: Not at all - 0  Feeling tired or having little energy: Not at all - 0  Poor appetite or over eating: Not at all - 0  Feeling bad about yourself - or that you are a failure or have let yourself or your family down: Not at all - 0  Trouble concentrating on things, such as reading the newspaper or watching television: Not at all - 0  Moving or speaking so slowly that other people could have noticed  Or the opposite -  being so fidgety or restless that you have been moving around a lot more than usual: Not at all - 0  Thoughts that you would be better off dead, or of hurting yourself in some way: Not at all - 0   PHQ-9 Adult Depression Screening Negative     PHQ-9 Difficulty Level Not difficult at all     PHQ-9 Severity Minimal Depression         Assessment    1   Major depressive disorder, recurrent episode, moderate (296 32) (F33 1)    Signatures Electronically signed by : ALEKSANDER Herrera LCSW; Oct 25 2017  3:04PM EST                       (Author)    Electronically signed by : ALEKSANDER Herrera LCSW; Oct 25 2017  8:18PM EST                       (Author)

## 2018-01-16 NOTE — PSYCH
Progress Note  Psychotherapy Provided [facility]: Individual Psychotherapy 45 mins minutes provided today  Goals addressed in session:   (G#1 ) I have had a couple of bad days  I cried  My feelings were hurt " states her , Roberto Diaz, and her had an argument adding, " it is always my fault  I always thought he would change  We are getting older and don't know how long we will be around " When sharing how they met and what she likes about him she noted, "He is very kind to me, he is very good looking, he is very smart " She noted his mother  when he was age 9  and that he had lived with his aunts  She noted had found a list of her dislikes about him that she gave him "twenty years ago  Nothing has changed " discussed the importance of her considering compiling a list of things she does like about him and have him do the same to begin a dialogue between them; discussed self-worth and the ability to not personalize someone's behaviors of not communicating but to instead attempt to understand the other person, to start a dialogue; A: confirms her desire to feel closer to her  and acknowledges "he is not much about communicating feelings;"P:(G# 1) will continue her efforts to better understand herself including learn pos /realistic self-talk messages (ex , affirmations); Pain Scale and Suicide Risk  [facility]: On a scale of 0 to 10, the patient rates current pain at 0   Current suicide risk is low   Behavioral Health Treatment Plan [facility]: Diagnosis and Treatment Plan explained to patient, patient relates understanding diagnosis and is agreeable to Treatment Plan  Assessment    1   Major depressive disorder, recurrent episode, moderate (296 32) (F33 1)    Signatures   Electronically signed by : ALEKSANDER Yanez,SHANEL; Mar 23 2017  5:42PM EST                       (Author)

## 2018-01-16 NOTE — RESULT NOTES
Verified Results  (1) MICROALBUMIN CREATININE RATIO, RANDOM URINE 25Jan2017 11:19AM [employer]     Test Name Result Flag Reference   MICROALBUMIN/ CREAT R 30 mg/g creatinine  0-30   MICROALBUMIN,URINE 29 4 mg/L H 0 0-20 0   CREATININE URINE 99 2 mg/dL       (1) VITAMIN D 25-HYDROXY [id number] 11:18AM [employer]     Test Name Result Flag Reference   VIT D 25-HYDROX 35 8 ng/mL  30 0-100 0   This assay is a certified procedure of the CDC Vitamin D Standardization Certification Program (VDSCP)     Deficiency <20ng/ml   Insufficiency 20-30ng/ml   Sufficient  ng/ml     *Patients undergoing fluorescein dye angiography may retain small amounts of fluorescein in the body for 48-72 hours post procedure  Samples containing fluorescein can produce falsely elevated Vitamin D values  If the patient had this procedure, a specimen should be resubmitted post fluorescein clearance  (1) COMPREHENSIVE METABOLIC PANEL [id number] 32:42UL [employer]     Test Name Result Flag Reference   GLUCOSE,RANDM 156 mg/dL H    If the patient is fasting, the ADA then defines impaired fasting glucose as > 100 mg/dL and diabetes as > or equal to 123 mg/dL  SODIUM 141 mmol/L  136-145   POTASSIUM 4 2 mmol/L  3 5-5 3   CHLORIDE 105 mmol/L  100-108   CARBON DIOXIDE 27 mmol/L  21-32   ANION GAP (CALC) 9 mmol/L  4-13   BLOOD UREA NITROGEN 10 mg/dL  5-25   CREATININE 0 70 mg/dL  0 60-1 30   Standardized to IDMS reference method   CALCIUM 9 1 mg/dL  8 3-10 1   BILI, TOTAL 0 50 mg/dL  0 20-1 00   ALK PHOSPHATAS 69 U/L     ALT (SGPT) 32 U/L  12-78   AST(SGOT) 27 U/L  5-45   ALBUMIN 3 5 g/dL  3 5-5 0   TOTAL PROTEIN 7 4 g/dL  6 4-8 2   eGFR Non-African American      >60 0 ml/min/1 73sq m   - Patient Instructions:  This is a fasting blood test  Water, black tea or black coffee only after 9:00pm the night before test Drink 2 glasses of water the morning of test   National Kidney Disease Education Program recommendations are as follows:  GFR calculation is accurate only with a steady state creatinine  Chronic Kidney disease less than 60 ml/min/1 73 sq  meters  Kidney failure less than 15 ml/min/1 73 sq  meters  (1) LIPID PANEL FASTING W DIRECT LDL REFLEX [id number] 11:18AM Karin Castillo     Test Name Result Flag Reference   CHOLESTEROL 182 mg/dL     LDL CHOLESTEROL CALCULATED 54 mg/dL  0-100   - Patient Instructions: This is a fasting blood test  Water, black tea or black coffee only after 9:00pm the night before test   Drink 2 glasses of water the morning of test     - Patient Instructions: This is a fasting blood test  Water, black tea or black coffee only after 9:00pm the night before test Drink 2 glasses of water the morning of test   Triglyceride:         Normal              <150 mg/dl       Borderline High    150-199 mg/dl       High               200-499 mg/dl       Very High          >499 mg/dl  Cholesterol:         Desirable        <200 mg/dl      Borderline High  200-239 mg/dl      High             >239 mg/dl  HDL Cholesterol:        High    >59 mg/dL      Low     <41 mg/dL  LDL Cholesterol:        Optimal          <100 mg/dl        Near Optimal     100-129 mg/dl        Above Optimal          Borderline High   130-159 mg/dl          High              160-189 mg/dl          Very High        >189 mg/dl  LDL CALCULATED:    This screening LDL is a calculated result  It does not have the accuracy of the Direct Measured LDL in the monitoring of patients with hyperlipidemia and/or statin therapy  Direct Measure LDL (NXP317) must be ordered separately in these patients  TRIGLYCERIDES 231 mg/dL H <=150   Specimen collection should occur prior to N-Acetylcysteine or Metamizole administration due to the potential for falsely depressed results  HDL,DIRECT 82 mg/dL H 40-60   Specimen collection should occur prior to Metamizole administration due to the potential for falsely depressed results       (1) TSH WITH FT4 REFLEX [id number] 11:18AM Nelson Stauffer     Test Name Result Flag Reference   TSH 2 690 uIU/mL  0 358-3 740   - Patient Instructions: This is a fasting blood test  Water, black tea or black coffee only after 9:00pm the night before test Drink 2 glasses of water the morning of test   Patients undergoing fluorescein dye angiography may retain small amounts of fluorescein in the body for 48-72 hours post procedure  Samples containing fluorescein can produce falsely depressed TSH values  If the patient had this procedure,a specimen should be resubmitted post fluorescein clearance  The recommended reference ranges for TSH during pregnancy are as follows:  First trimester 0 1 to 2 5 uIU/mL  Second trimester  0 2 to 3 0 uIU/mL  Third trimester 0 3 to 3 0 uIU/m     (1) CBC/PLT/DIFF [id number] 11:18AM Nelson Stauffer     Test Name Result Flag Reference   WBC COUNT 8 34 Thousand/uL  4 31-10 16   RBC COUNT 4 57 Million/uL  3 81-5 12   HEMOGLOBIN 13 5 g/dL  11 5-15 4   HEMATOCRIT 40 7 %  34 8-46  1   MCV 89 fL  82-98   MCH 29 5 pg  26 8-34 3   MCHC 33 2 g/dL  31 4-37 4   RDW 13 6 %  11 6-15 1   MPV 11 2 fL  8 9-12 7   PLATELET COUNT 852 Thousands/uL  149-390   nRBC AUTOMATED 0 /100 WBCs     NEUTROPHILS RELATIVE PERCENT 66 %  43-75   LYMPHOCYTES RELATIVE PERCENT 23 %  14-44   MONOCYTES RELATIVE PERCENT 5 %  4-12   EOSINOPHILS RELATIVE PERCENT 5 %  0-6   BASOPHILS RELATIVE PERCENT 1 %  0-1   NEUTROPHILS ABSOLUTE COUNT 5 53 Thousands/?L  1 85-7 62   LYMPHOCYTES ABSOLUTE COUNT 1 90 Thousands/?L  0 60-4 47   MONOCYTES ABSOLUTE COUNT 0 42 Thousand/?L  0 17-1 22   EOSINOPHILS ABSOLUTE COUNT 0 42 Thousand/?L  0 00-0 61   BASOPHILS ABSOLUTE COUNT 0 05 Thousands/?L  0 00-0 10   - Patient Instructions: This bloodwork is non-fasting  Please drink two glasses of water morning of bloodwork  - Patient Instructions: This bloodwork is non-fasting  Please drink two glasses of water morning of bloodwork

## 2018-01-17 NOTE — RESULT NOTES
Verified Results  (1) HEMOGLOBIN A1C 20Apr2017 12:24PM Neda Bodily Order Number: [id number]     Test Name Result Flag Reference   HEMOGLOBIN A1C 6 9 % H 4 2-6 3   EST  AVG  GLUCOSE 151 mg/dl       (1) COMPREHENSIVE METABOLIC PANEL [id number] 17:69CR Neda Bodily Order Number: [id number]     Test Name Result Flag Reference   GLUCOSE,RANDM 95 mg/dL     If the patient is fasting, the ADA then defines impaired fasting glucose as > 100 mg/dL and diabetes as > or equal to 123 mg/dL  SODIUM 140 mmol/L  136-145   POTASSIUM 4 6 mmol/L  3 5-5 3   CHLORIDE 103 mmol/L  100-108   CARBON DIOXIDE 28 mmol/L  21-32   ANION GAP (CALC) 9 mmol/L  4-13   BLOOD UREA NITROGEN 11 mg/dL  5-25   CREATININE 0 69 mg/dL  0 60-1 30   Standardized to IDMS reference method   CALCIUM 9 8 mg/dL  8 3-10 1   BILI, TOTAL 0 31 mg/dL  0 20-1 00   ALK PHOSPHATAS 76 U/L     ALT (SGPT) 52 U/L  12-78   AST(SGOT) 68 U/L H 5-45   ALBUMIN 3 5 g/dL  3 5-5 0   TOTAL PROTEIN 7 6 g/dL  6 4-8 2   eGFR Non-African American      >60 0 ml/min/1 73sq [facility] Disease Education Program recommendations are as follows:  GFR calculation is accurate only with a steady state creatinine  Chronic Kidney disease less than 60 ml/min/1 73 sq  meters  Kidney failure less than 15 ml/min/1 73 sq  meters

## 2018-01-17 NOTE — MISCELLANEOUS
Message  GI Reminder Recall Delorise Lot:   Date: 08/23/2017   Dear Eleanor Bence:     Review of our records shows you are due for the following: Follow Up Visit  Please call the following office to schedule your appointment:   [address], [address] [place] [address], [address] [place] [address] [phone]  We look forward to hearing from you!      Sincerely,     [facility]      Signatures   Electronically signed by : Lilian Bowers, ; Aug 23 2017  8:44AM EST                       (Author)

## 2018-01-17 NOTE — PSYCH
1  Major depressive disorder, recurrent episode, moderate (296 32) (F33 1)      Date of Initial Treatment Plan: 2/7/17  Date of Current Treatment Plan: 6/13/17  Treatment Plan 2  Strengths/Personal Resources for Self Care: "When I am with people I fit right in with the group  I have a sense of humor  "  Diagnosis:   Axis I: F33 1   Axis II: deferred   Axis III: IBS     Area of Needs: self-esteem;  Long Term Goals:   # 1 I want to be happy  I want to stop feeling bad when things go different than I thought they should "   Target Date: 10/13/17              Short Term Objectives:   Goal 1:   A  I want to understand was acceptance ("It is what it is ") means  B  I want to remind myself that it is ok if I feel bad about something (personal/subjective)  C  I want to not be "nosey" regarding my children's lives  D  I will identify my values  Target Date: 10/13/17              GOAL 1: Modality: Individual 2 x per month Target Date: 10/13/17       The person(s) responsible for carrying out the plan is Caitlin Arevalo and Zander Hansen  The first scheduled review date is 10/13/17  The expected length of service is  Level of functioning at initial assessment: 65  The highest level of functioning in the past year was unknown  The current level of functioning is 68             Patient Signature: _________________________________ Date/Time: ______________       Electronically signed by : Toney Barone MSWLCSWMSBAUTISTA,SHANEL; Jun 13 2017  3:10PM EST                       (Author)

## 2018-01-18 NOTE — PSYCH
Progress Note  Psychotherapy Provided [facility]: Individual Psychotherapy 30 mins  minutes provided today  Goals addressed in session:   (G#1 ) states while was prescribed physical therapy by her family physician due to her report of" pains in my back and legs when I walk," reports was advised to use non weight bearing exercises due to issues with R foot (multiple surgeries); She states is waiting for her air cast for ambulation  states is currently temporarily using a metal brace type of "cast;" When asked about her qualities/strengths/assets (handout-assignment), she identified faithful, generous (ex , time,activity, ex , baking, sometimes money, if affordable), hopeful ("I am hopeful I will feel better about myself, and my foot won't give me any more problems "), polite, punctual, reliable, sincere, trustworthy "I am a lot calmer and going with the flow  I don't seem to get as upset about things as I did  I am thinking a lot before I speak " She noted had had a recent candid conversation with her  about his reasons for their not having taken a vacation during the past several years  She noted while his response included financial limitations, she noted he had also expressed concern "that something could happen to me on the vacation (they go to the beach and both enjoy spending all day on the beach including walking on the beach) " She stated had felt that his was sincere in his concern for her which she states was gratifying to her  discussed her efforts to rethink how she chooses to respond to situations and her feeling better about herself regarding her efforts to be more effective in dealing with her life; A: presents as pleasant, can joke during the session and as gradually becoming more self-aware; P: (G#1) will continue to be more aware of focusing on what is going on around her more objectively;   Pain Scale and Suicide Risk [facility]: On a scale of 0 to 10, the patient rates current pain at 2   Current suicide risk is low   Behavioral Health Treatment Plan [facility]: Diagnosis and Treatment Plan explained to patient, patient relates understanding diagnosis and is agreeable to Treatment Plan  Results/Data  PHQ-9 Adult Depression Screening [id number] 06:16PM Gisselle Cedillo     Test Name Result Flag Reference   PHQ-9 Adult Depression Score 2     Over the last two weeks, how often have you been bothered by any of the following problems? Little interest or pleasure in doing things: Not at all - 0  Feeling down, depressed, or hopeless: Several days - 1  Trouble falling or staying asleep, or sleeping too much: Not at all - 0  Feeling tired or having little energy: Not at all - 0  Poor appetite or over eating: Not at all - 0  Feeling bad about yourself - or that you are a failure or have let yourself or your family down: Several days - 1  Trouble concentrating on things, such as reading the newspaper or watching television: Not at all - 0  Moving or speaking so slowly that other people could have noticed  Or the opposite -  being so fidgety or restless that you have been moving around a lot more than usual: Not at all - 0  Thoughts that you would be better off dead, or of hurting yourself in some way: Not at all - 0   PHQ-9 Adult Depression Screening Negative     PHQ-9 Difficulty Level Somewhat difficult     PHQ-9 Severity Minimal Depression       PHQ-9 Adult Depression Screening [id number] 06:16PM [employer]     Test Name Result Flag Reference   PHQ-9 Adult Depression Score 2     Over the last two weeks, how often have you been bothered by any of the following problems?   Little interest or pleasure in doing things: Not at all - 0  Feeling down, depressed, or hopeless: Several days - 1  Trouble falling or staying asleep, or sleeping too much: Not at all - 0  Feeling tired or having little energy: Not at all - 0  Poor appetite or over eating: Not at all - 0  Feeling bad about yourself - or that you are a failure or have let yourself or your family down: Several days - 1  Trouble concentrating on things, such as reading the newspaper or watching television: Not at all - 0  Moving or speaking so slowly that other people could have noticed  Or the opposite -  being so fidgety or restless that you have been moving around a lot more than usual: Not at all - 0  Thoughts that you would be better off dead, or of hurting yourself in some way: Not at all - 0   PHQ-9 Adult Depression Screening Negative     PHQ-9 Difficulty Level Somewhat difficult     PHQ-9 Severity Minimal Depression         Assessment    1   Major depressive disorder, recurrent episode, moderate (296 32) (F33 1)    Signatures   Electronically signed by : Modesta Dewey MSWLCSWROBYN,SHANEL; Feb 21 2017  6:25PM EST                       (Author)

## 2018-01-18 NOTE — PSYCH
Progress Note  Psychotherapy Provided [facility]: Individual Psychotherapy 45 mins  minutes provided today  Goals addressed in session:   (G# 1 ) "I told myself all day yesterday, "I am not going to text my daughter " She noted she did do so and stated "it was just a check-in " She reported she is using the two quotes (from previous session discussion)  She repeated her plan to pay more attention to what she would like to do with her life than to pay too much attention to "what they are doing " She noted progress she and her  are making in how/what they communicate to each other  She cited an example in which she noted her efforts to offset an argument, the latter which she noted as not worth arguing about  discussed her being more aware of how she is contributing and can continue to contribute her efforts to improve her quality of life (self-advocacy); A: presents as more upbeat and as feeling better about her "self;" She is less hesitant to share her thoughts/feelings  She feels especially positive about her wt  mgt  efforts  P: (G# 1) will continue to implement self-care strategies; Pain Scale and Suicide Risk [facility]: On a scale of 0 to 10, the patient rates current pain at 0   Current suicide risk is low   Results/Data  PHQ-9 Adult Depression Screening [id number] 10:02AM Poli Curry     Test Name Result Flag Reference   PHQ-9 Adult Depression Score 0     Over the last two weeks, how often have you been bothered by any of the following problems?   Little interest or pleasure in doing things: Not at all - 0  Feeling down, depressed, or hopeless: Not at all - 0  Trouble falling or staying asleep, or sleeping too much: Not at all - 0  Feeling tired or having little energy: Not at all - 0  Poor appetite or over eating: Not at all - 0  Feeling bad about yourself - or that you are a failure or have let yourself or your family down: Not at all - 0  Trouble concentrating on things, such as reading the newspaper or watching television: Not at all - 0  Moving or speaking so slowly that other people could have noticed  Or the opposite -  being so fidgety or restless that you have been moving around a lot more than usual: Not at all - 0  Thoughts that you would be better off dead, or of hurting yourself in some way: Not at all - 0   PHQ-9 Adult Depression Screening Negative     PHQ-9 Difficulty Level Not difficult at all     PHQ-9 Severity No Depression       PHQ-9 Adult Depression Screening [id number] 10:02AM Marcelo Cantor     Test Name Result Flag Reference   PHQ-9 Adult Depression Score 0     Over the last two weeks, how often have you been bothered by any of the following problems? Little interest or pleasure in doing things: Not at all - 0  Feeling down, depressed, or hopeless: Not at all - 0  Trouble falling or staying asleep, or sleeping too much: Not at all - 0  Feeling tired or having little energy: Not at all - 0  Poor appetite or over eating: Not at all - 0  Feeling bad about yourself - or that you are a failure or have let yourself or your family down: Not at all - 0  Trouble concentrating on things, such as reading the newspaper or watching television: Not at all - 0  Moving or speaking so slowly that other people could have noticed  Or the opposite -  being so fidgety or restless that you have been moving around a lot more than usual: Not at all - 0  Thoughts that you would be better off dead, or of hurting yourself in some way: Not at all - 0   PHQ-9 Adult Depression Screening Negative     PHQ-9 Difficulty Level Not difficult at all     PHQ-9 Severity No Depression         Assessment    1   Major depressive disorder, recurrent episode, moderate (296 32) (F33 1)    Signatures   Electronically signed by : Vitaliy Ortiz, MSWLCSWMSBAUTISTA,SAWYERW; Jul 5 2017 11:04AM EST                       (Author)

## 2018-01-18 NOTE — PSYCH
Progress Note  Psychotherapy Provided [facility]: Individual Psychotherapy 45 mins  minutes provided today  Goals addressed in session:   (G# 1) "I am still having trouble with some of my daughter's responses (to me) " She elaborated on a more recent example of her  needing help ("He is not comfortable to ask for help ")  states she and her  are "getting along better;" She provided some examples  states her R foot is currently in a new brace noting, She noted her specialist had informed her, "I can't ever (again) walk on this foot without support " discussed her ongoing efforts to effectively communicate with her  to include assertiveness, conflict resolution and active listening strategies; discussed the subsequent counseling appointments and whether to keep all/cancel some, etc ; tA: presents as feeling good about her progress in her relationship her ; presents as less depressed and as more self-confident; P:(G#1 ) will continue to assert herself with her ; will review her appts  and decide if any are to be cancelled due to her progress;   Pain Scale and Suicide Risk [facility]: On a scale of 0 to 10, the patient rates current pain at 0   Current suicide risk is low   Behavioral Health Treatment Plan [facility]: Diagnosis and Treatment Plan explained to patient, patient relates understanding diagnosis and is agreeable to Treatment Plan  Results/Data  PHQ-9 Adult Depression Screening [id number] 01:08PM Linda Hargrove     Test Name Result Flag Reference   PHQ-9 Adult Depression Score 2     Over the last two weeks, how often have you been bothered by any of the following problems?   Little interest or pleasure in doing things: Not at all - 0  Feeling down, depressed, or hopeless: Several days - 1  Trouble falling or staying asleep, or sleeping too much: Not at all - 0  Feeling tired or having little energy: Several days - 1  Poor appetite or over eating: Not at all - 0  Feeling bad about yourself - or that you are a failure or have let yourself or your family down: Not at all - 0  Trouble concentrating on things, such as reading the newspaper or watching television: Not at all - 0  Moving or speaking so slowly that other people could have noticed  Or the opposite -  being so fidgety or restless that you have been moving around a lot more than usual: Not at all - 0  Thoughts that you would be better off dead, or of hurting yourself in some way: Not at all - 0   PHQ-9 Adult Depression Screening Negative     PHQ-9 Difficulty Level Not difficult at all     PHQ-9 Severity Minimal Depression       PHQ-9 Adult Depression Screening [id number] 01:08PM Carolyn Hearn     Test Name Result Flag Reference   PHQ-9 Adult Depression Score 2     Over the last two weeks, how often have you been bothered by any of the following problems? Little interest or pleasure in doing things: Not at all - 0  Feeling down, depressed, or hopeless: Several days - 1  Trouble falling or staying asleep, or sleeping too much: Not at all - 0  Feeling tired or having little energy: Several days - 1  Poor appetite or over eating: Not at all - 0  Feeling bad about yourself - or that you are a failure or have let yourself or your family down: Not at all - 0  Trouble concentrating on things, such as reading the newspaper or watching television: Not at all - 0  Moving or speaking so slowly that other people could have noticed  Or the opposite -  being so fidgety or restless that you have been moving around a lot more than usual: Not at all - 0  Thoughts that you would be better off dead, or of hurting yourself in some way: Not at all - 0   PHQ-9 Adult Depression Screening Negative     PHQ-9 Difficulty Level Not difficult at all     PHQ-9 Severity Minimal Depression         Assessment    1   Major depressive disorder, recurrent episode, moderate (296 32) (F33 1)    Signatures   Electronically signed by : Soledad Israel, MSWLCSWMSBAUTISTA,SHANEL; Aug 29 2017  1:08PM EST                       (Author)

## 2018-01-22 ENCOUNTER — GENERIC CONVERSION - ENCOUNTER (OUTPATIENT)
Dept: OTHER | Facility: OTHER | Age: 63
End: 2018-01-22

## 2018-01-23 VITALS
RESPIRATION RATE: 18 BRPM | DIASTOLIC BLOOD PRESSURE: 78 MMHG | SYSTOLIC BLOOD PRESSURE: 138 MMHG | OXYGEN SATURATION: 94 % | HEIGHT: 66 IN | BODY MASS INDEX: 47.09 KG/M2 | WEIGHT: 293 LBS | HEART RATE: 100 BPM

## 2018-01-23 VITALS — BODY MASS INDEX: 50.24 KG/M2 | WEIGHT: 293 LBS

## 2018-01-23 NOTE — PROGRESS NOTES
Plan    1  DSMT/MNT Time Record; Status:Complete;   Done: [id number] 05:24PM    Discussion/Summary    PATIENT EDUCATION RECORD   Indication for Services: hypertension, type 2 Diabetes Mellitus and obesity  She is ready to learn  She has no barriers to learning  Diabetes Disease Process:   She understands the pathophysiology of diabetes: Method: Instruction  Response: Verbalizes Understanding   Discussed benefits of control: Method: Instruction  Response: Verbalizes Understanding   Healthy Eating:   Discussed importance of meal timing/consistency: Method: Instruction and Handout  Response: Verbalizes Understanding   Discussed nutrient types ( Cho/Fat/Protein): Method: Instruction and Handout  Response: Verbalizes Understanding   Discussed portion sizes: Method: Instruction, Handout and Demonstration  Response: Verbalizes Understanding   Discussed Eating Out: Method: Instruction  Response: Verbalizes Understanding   Discussed food label reading: Method: Instruction, Handout and Demonstration  Response: Verbalizes Understanding  Provided food diary and instructions on use: Method: Instruction, Handout and DemonstrationResponse: Verbalizes Understanding   Provided meal planning: Method: Instruction, Handout and Demonstration  Response: Verbalizes Understanding Her current weight is 310  Her keal needs are 1383  Her CHO's per meal are 104 g/day 30% carb  He/She was provided a meal plan for: fixed carbohydrates and weight loss  Discussed weight management/weight loss: Method: Instruction  Response: Verbalizes Understanding   Her current BMI 50  Discussed basic carbohydrate counting: Method: Instruction, Handout and Demonstration  Response: Verbalizes Understanding   Being Active:   Stated the benefits of exercise: Method: Instruction  Response: Verbalizes Understanding   Discussed proper exercise program: Method: Instruction and Handout  Response: Verbalizes Understanding   Monitoring:   Discussed target blood glucose ranges: Method: Instruction and Handout  Response: Verbalizes Understanding  Discussed target hemoglobin A1c: Method: Instruction  Response: Verbalizes Understanding  Discussed testing times: Method: Instruction and Handout  Response: Verbalizes Understanding  Discussed reporting of readings to M D : Method: Instruction  Response: Verbalizes Understanding  Healthy Coping Class:   Identified lifestyle behaviors that need to change: Method: Instruction  Response: Verbalizes Understanding   Discussed motivation to change: Method: Instruction  Response: Verbalizes Understanding   Identified goals for behavior change: Method: Instruction  Response: Verbalizes Understanding   Discussed strategies for change: Method: Instruction  Response: Verbalizes Understanding   She participated in goal setting  She was given the following educational materials: portion book, Personal Meal Plan 1383 calories, Planning Healthy Meals and Calorie and Carbohydrate Tracking Books/Websites/Phone Apps  Chair Exercises  Chief Complaint  Patient is here today for Medical Nutrition Therapy for T2DM      History of Present Illness  Patient is a 59 y/o female with T2DM, HTN, Fibromyalgia, Morbid Obesity  A1c 7 3, BMI 50  Patient is not checking her BG  She stated she does not like to prick her finger  We discussed importance of testing, particularly paired testing in order to find patterns of hyperglycemia to facilitate treatment adjustments  She has agreed to be more consistent with testing  Her food history shows skipping of breakfast and a higher carb intake for dinner  She stated that she doesn't feel like she eats a lot but does admit to large portion sizes of pasta amd other high carb foods at dinner  She does not eat vegetables that often because her  doesn't like them  She was educated on meal planning/carb counting and given a 1400 calorie low carb(30%) personal meal plan   She was encouraged to not skip meals and to do some chair exercises to increase movement  This is her initial assessment    Present at session: patient  and spouse    She has no special learning needs  Her caloric needs are 1383  Recent weight change: +3    Patient  shops for food  Patient  cooks the food  Exercise routine:  None due to ankle and fibromyalgia   She eats breakfast at  AM Skips    She eats lunch at  PM Leftovers or canned soup or fast food, diet soda or unsw  iced tea    She eats dinner at  PM 3-4 c  pasta or meat, potatoes, green beans, diet soda    Medical Nutrition Therapy Intervention: Meal planning, Individualized meal plan, Strategies to increase the intake of plant based foods, Strategies to monitor portion control, Label reading, Meal timing, Exercise Guidelines and Behavior modification strategies  Her comprehension was good   Her motivation was good   Her compliance was good   Goals:  1  Follow meal plan/count carbs  2  SMBG using paired testing  3  Start exercise program using chair exercises      Active Problems    1  Ankle pain, unspecified laterality   2  Colon cancer screening (V76 51) (Z12 11)   3  Diabetes mellitus, type 2 (250 00) (E11 9)   4  Encounter for screening mammogram for breast cancer (V76 12) (Z12 31)   5  Essential tremor (333 1) (G25 0)   6  Fibromyalgia (729 1) (M79 7)   7  Flu vaccine need (V04 81) (Z23)   8  Gait disorder (781 2) (R26 9)   9  History of colon polyps (V12 72) (Z86 010)   10  Hyperlipidemia (272 4) (E78 5)   11  Hypertension (401 9) (I10)   12  Influenza vaccination administered during current admission (V04 81) (Z23)   13  Irritable bowel syndrome (564 1) (K58 9)   14  Lower back pain (724 2) (M54 5)   15  Major depressive disorder, recurrent episode, moderate (296 32) (F33 1)   16  Morbid obesity with BMI of 50 0-59 9, adult (278 01,V85 43) (E66 01,Z68 43)   17  Need for hepatitis C screening test (V73 89) (Z11 59)   18   Pap smear for cervical cancer screening (V76 2) (Z12 4)   19  Rosacea (695 3) (L71 9)   20  Serous otitis media (381 4) (H65 90)   21  Situational depression (309 0) (F43 21)   22  Tinea cruris (110 3) (B35 6)   23  Urinary incontinence (788 30) (R32)   24  Visit for screening mammogram (V76 12) (Z12 31)   25  Vitamin D deficiency (268 9) (E55 9)    Past Medical History    1  History of Dysfunction of Eustachian tube, unspecified laterality (381 81) (H69 80)   2  History of Endometriosis (617 9) (N80 9)   3  History of acute sinusitis (V12 69) (Z87 09)   4  History of anemia (V12 3) (Z86 2)   5  History of Bell's palsy (V12 49) (Z86 69)   6  History of Otalgia, unspecified laterality (388 70) (H92 09)    Surgical History    1  History of Ankle Surgery   2  History of  Section   3  History of Spinal Anesthesia Epidural   4  History of Total Hip Replacement    Family History  Mother    1  Family history of CABG  Father    2  Family history of Father  At Age 77   1  Family history of Gangrene    Social History    · Alcohol Use (History)   · Never smoker    Current Meds   1  Arthrotec 75-0 2 MG Oral Tablet Delayed Release (Diclofenac-Misoprostol); 1 PO BID; Therapy: 98MEO9979 to Recorded   2  Fluticasone Propionate 50 MCG/ACT Nasal Suspension; USE 2 SPRAYS IN EACH   NOSTRIL ONCE DAILY; Therapy: 24JWR5285 to (Gauri Post)  Requested for: 70SLI5173; Last   Rx:75Gya3520 Ordered   3  Gabapentin 100 MG Oral Capsule; Start with one capsule at bedtime titrate up to 3   capsules at bedtime based on symptoms; Therapy: (Recorded:70Har5449) to Recorded   4  Januvia 100 MG Oral Tablet; TAKE 1 TABLET DAILY; Therapy: 09Qvc0783 to (Evaluate:53Ywp5272)  Requested for: 11Kzb0258; Last   Rx:47Qop2662 Ordered   5  Nystatin 398970 UNIT/GM External Cream; APPLY AND RUB IN A THIN FILM TO   AFFECTED AREAS TWICE DAILY (IN THE MORNING AND IN THE EVENING); Therapy: 98LUC8727 to (Last Rx:63Obo8182)  Requested for: 61Yhb6835 Ordered   6   Nystop 265062 UNIT/GM External Powder; apply to affected area twice a day as directed; Therapy: 66MUW7918 to (Ken Lloyd)  Requested for: 29BDQ0019; Last   Rx:08Jan2018 Ordered   7  OneTouch UltraSoft Lancets Miscellaneous; TEST TWICE DAILY OR AS DIRECTED; Therapy: 63USI7642 to (Last Rx:30Jan2017)  Requested for: 30Jan2017 Ordered   8  OneTouch Verio In [employer]; TEST 1-2 x daily as directed by physician; Therapy: 24TGW0500 to (Last Rx:30Jan2017)  Requested for: 30Jan2017 Ordered   9  OneTouch Verio w/Device Kit; USE AS DIRECTED; Therapy: 97FJF6995 to (Last Rx:30Jan2017) Ordered   10  Quinapril HCl - 40 MG Oral Tablet; Take 1 tablet daily; Therapy: 64DNL2338 to (Last Rx:01Sep2017)  Requested for: 01Sep2017 Ordered   11  Simvastatin 40 MG Oral Tablet; Take 1 tablet daily; Therapy: 52ZBA8893 to (Last Rx:01Sep2017)  Requested for: 01Sep2017 Ordered   12  Vitamin D 2000 UNIT Oral Capsule; take 3 daily; Therapy: 59Ccc8923 to Recorded    Allergies    1  Augmentin TABS   2  Doxycycline Hyclate CAPS   3  Erythromycin TABS   4  Lodine CAPS   5  LORazepam TABS   6  Sulfa Drugs    Vitals  Signs   Recorded: 12BCF2184 05:25PM   Weight: 311 lb 4 oz  BMI Calculated: 50 24  BSA Calculated: 2 41    Future Appointments    Date/Time Provider Specialty Site   04/30/2018 10:00 AM BETSEY [place]   Family Medicine [address] [facility]     Signatures   Electronically signed by : Peewee Randle RD; Jan 9 2018  5:36PM EST                       (Author)    Electronically signed by : BETSEY Mijares ; Luis 10 2018  8:49AM EST

## 2018-01-23 NOTE — RESULT NOTES
Discussion/Summary   preliminary workups for liver disease was negative including negative hepatitis panel  negative EVELYN and negative iron panel  Verified Results  (1) EVELYN SCREEN W/REFLEX TO TITER/PATTERN [id number] 11:35AM [employer] Order Number: [id number]     Test Name Result Flag Reference   EVELYN SCREEN  Negative  Negative     (1) CHRONIC HEPATITIS PANEL 90Bpo1647 11:35AM [place]     Test Name Result Flag Reference   HEPATITIS B SURFACE ANTIGEN Non-reactive  Non-reactive, NonReactive - Confirmed   HEPATITIS C ANTIBODY Non-reactive  Non-reactive   HEPATITIS B CORE IGM ANTIBODY Non-reactive  Non-reactive   HEPATITIS B CORE TOTAL ANTIBODY Non-reactive  Non-reactive     (1) SMOOTH MUSCLE ANTIBODY 90Jag9267 11:35AM [employer] Order Number: [id number]     Test Name Result Flag Reference   SMOOTH MUS AB 5 Units  0 - 19   Negative                     0 - 19                   Weak positive               20 - 30                   Moderate to strong positive     >30   Actin Antibodies are found in 52-85% of patients with   autoimmune hepatitis or chronic active hepatitis and   in 22% of patients with primary biliary cirrhosis  Performed at:  [address] [place] [address]  [id number]  : Braulio Gaffney MD, Phone:  [phone]       Plan  PMH: Abnormal liver enzymes    · (1) EVELYN SCREEN W/REFLEX TO TITER/PATTERN; Status:Complete;   Done: 33FQK7141  11:35AM   · (1) CHRONIC HEPATITIS PANEL; Status:Complete;   Done: 83WWB3236 11:35AM   · (1) FERRITIN; Status:Active;  Requested OQY:82AQX2271;    · (1) IRON PANEL; Status:Resulted - Requires Verification;   Done: 86DQY9537 11:35AM   · (1) SMOOTH MUSCLE ANTIBODY; Status:Complete;   Done: 53SGU6228 11:35AM

## 2018-01-23 NOTE — RESULT NOTES
Verified Results  (1) COMPREHENSIVE METABOLIC PANEL 77JRM3325 66:99LI Jorje Banks Order Number: [id number]     Test Name Result Flag Reference   SODIUM 140 mmol/L  136-145   POTASSIUM 4 2 mmol/L  3 5-5 3   CHLORIDE 106 mmol/L  100-108   CARBON DIOXIDE 27 mmol/L  21-32   ANION GAP (CALC) 7 mmol/L  4-13   BLOOD UREA NITROGEN 11 mg/dL  5-25   CREATININE 0 64 mg/dL  0 60-1 30   Standardized to IDMS reference method   CALCIUM 9 4 mg/dL  8 3-10 1   BILI, TOTAL 0 38 mg/dL  0 20-1 00   ALK PHOSPHATAS 64 U/L     ALT (SGPT) 37 U/L  12-78   Specimen collection should occur prior to Sulfasalazine and/or Sulfapyridine administration due to the potential for falsely depressed results  AST(SGOT) 30 U/L  5-45   Specimen collection should occur prior to Sulfasalazine administration due to the potential for falsely depressed results  ALBUMIN 3 6 g/dL  3 5-5 0   TOTAL PROTEIN 7 9 g/dL  6 4-8 2   eGFR 96 ml/min/1 73sq m     [facility] Disease Education Program recommendations are as follows:  GFR calculation is accurate only with a steady state creatinine  Chronic Kidney disease less than 60 ml/min/1 73 sq  meters  Kidney failure less than 15 ml/min/1 73 sq  meters  GLUCOSE FASTING 128 mg/dL H 65-99   Specimen collection should occur prior to Sulfasalazine administration due to the potential for falsely depressed results  Specimen collection should occur prior to Sulfapyridine administration due to the potential for falsely elevated results  (1) HEMOGLOBIN A1C 43PAV8914 11:35AM Jor[employer] Banks Order Number: [id number]     Test Name Result Flag Reference   HEMOGLOBIN A1C 7 3 % H 4 2-6 3   EST  AVG   GLUCOSE 163 mg/dl

## 2018-01-24 NOTE — MISCELLANEOUS
Message   Recorded as Task   Date: 01/18/2018 03:04 PM, Created By: Danielle Arriaza   Task Name: Medical Complaint Callback   Assigned To: Alisha Triage,Team   Regarding Patient: Mary Johnson, Status: In Progress   Comment:    Soraya Kaye - 18 Jan 2018 3:04 PM     TASK CREATED  Caller: Self; [phone] (Home); [phone] (Work)  has met w diabetic nutritionist and has been following diet and watching carbs carefully  BS readings are all over the place  FBS range from 174-180  before lunch - 120   2 hours after lunch-182, before dinner - 144, after dinner -170  Alan Bone - 18 Jan 2018 9:18 PM     TASK REPLIED TO: Previously Assigned To [employer]                      restart metformin at 500 mg bid   Eneida Rodríguez - 19 Jan 2018 8:46 AM     T[facility]ASSIGNED: [facility]   Oscar Guerreroa - 19 Jan 2018 4:49 PM     TASK IN PROGRESS   Oscar Dowell - 19 Jan 2018 4:50 PM     TASK EDITED  I spoke with pt and she is concerned about starting metformin again because it caused such issues for her with her IBS in the past   Pt wanted to know if maybe we could try increasing her [place] since she hasn't had any negative effects from it or if there was something else she could try  Eric Conley Jr - 22 Jan 2018 8:25 AM     TASK REPLIED TO: Previously Assigned To Bryan Traore is   At max dosing  We could try to add in [address] 5 mg daily   Soraya Kaye - 22 Jan 2018 11:39 AM     TASK EDITED  notified and rx sent  Active Problems    1  Ankle pain, unspecified laterality   2  Colon cancer screening (V76 51) (Z12 11)   3  Diabetes mellitus, type 2 (250 00) (E11 9)   4  Encounter for screening mammogram for breast cancer (V76 12) (Z12 31)   5  Essential tremor (333 1) (G25 0)   6  Fibromyalgia (729 1) (M79 7)   7  Flu vaccine need (V04 81) (Z23)   8  Gait disorder (781 2) (R26 9)   9  History of colon polyps (V12 72) (Z86 010)   10  Hyperlipidemia (272 4) (E78 5)   11  Hypertension (401 9) (I10)   12  Influenza vaccination administered during current admission (V04 81) (Z23)   13  Irritable bowel syndrome (564 1) (K58 9)   14  Lower back pain (724 2) (M54 5)   15  Major depressive disorder, recurrent episode, moderate (296 32) (F33 1)   16  Morbid obesity with BMI of 50 0-59 9, adult (278 01,V85 43) (E66 01,Z68 43)   17  Need for hepatitis C screening test (V73 89) (Z11 59)   18  Pap smear for cervical cancer screening (V76 2) (Z12 4)   19  Rosacea (695 3) (L71 9)   20  Serous otitis media (381 4) (H65 90)   21  Situational depression (309 0) (F43 21)   22  Tinea cruris (110 3) (B35 6)   23  Urinary incontinence (788 30) (R32)   24  Visit for screening mammogram (V76 12) (Z12 31)   25  Vitamin D deficiency (268 9) (E55 9)    Current Meds   1  Arthrotec 75-0 2 MG Oral Tablet Delayed Release (Diclofenac-Misoprostol); 1 PO BID; Therapy: 97VIR4773 to Recorded   2  Fluticasone Propionate 50 MCG/ACT Nasal Suspension; USE 2 SPRAYS IN EACH   NOSTRIL ONCE DAILY; Therapy: 85ZJI7670 to (David Seay)  Requested for: 03JRE0991; Last   Rx:99Put8163 Ordered   3  Gabapentin 100 MG Oral Capsule; Start with one capsule at bedtime titrate up to 3   capsules at bedtime based on symptoms; Therapy: (Recorded:29Dzu4478) to Recorded   4  Januvia 100 MG Oral Tablet; TAKE 1 TABLET DAILY; Therapy: 36Soq8818 to (Evaluate:29Uxl3989)  Requested for: 63Fvt0706; Last   Rx:85Oyr5743 Ordered   5  Nystatin 539959 UNIT/GM External Cream; APPLY AND RUB IN A THIN FILM TO   AFFECTED AREAS TWICE DAILY (IN THE MORNING AND IN THE EVENING); Therapy: 67DSI1871 to (Last Rx:27Qig7937)  Requested for: 18Auz0647 Ordered   6  Nystop 223180 UNIT/GM External Powder; apply to affected area twice a day as directed; Therapy: 74YGO3562 to (Jenniffer Shell)  Requested for: 93ZPL2718; Last   Rx:08Jan2018 Ordered   7   OneTouch UltraSoft Lancets Miscellaneous; TEST TWICE DAILY OR AS DIRECTED; Therapy: 70PNQ1831 to (Last Rx:30Jan2017)  Requested for: 30Jan2017 Ordered   8  OneTouch Verio In [employer]; TEST 1-2 x daily as directed by physician; Therapy: 96ZES1297 to (Last Rx:30Jan2017)  Requested for: 30Jan2017 Ordered   9  OneTouch Verio w/Device Kit; USE AS DIRECTED; Therapy: 31GXS7029 to (Last Rx:30Jan2017) Ordered   10  Quinapril HCl - 40 MG Oral Tablet; Take 1 tablet daily; Therapy: 08KXT7399 to (Last Rx:01Sep2017)  Requested for: 01Sep2017 Ordered   11  Simvastatin 40 MG Oral Tablet; Take 1 tablet daily; Therapy: 74SOV1906 to (Last Rx:01Sep2017)  Requested for: 01Sep2017 Ordered   12  Vitamin D 2000 UNIT Oral Capsule; take 3 daily; Therapy: 71Kgl4825 to Recorded    Allergies    1  Augmentin TABS   2  Doxycycline Hyclate CAPS   3  Erythromycin TABS   4  Lodine CAPS   5  LORazepam TABS   6   Sulfa Drugs    Plan  Diabetes mellitus, type 2    · Farxiga 5 MG Oral Tablet; TAKE 1 TABLET DAILY IN THE MORNING    Signatures   Electronically signed by : Giovana Boyle, ; Jan 22 2018 11:41AM EST                       (Author)

## 2018-02-14 ENCOUNTER — OFFICE VISIT (OUTPATIENT)
Dept: DIABETES SERVICES | Facility: CLINIC | Age: 63
End: 2018-02-14
Payer: COMMERCIAL

## 2018-02-14 VITALS — BODY MASS INDEX: 47.09 KG/M2 | WEIGHT: 293 LBS | HEIGHT: 66 IN

## 2018-02-14 DIAGNOSIS — E11.65 TYPE 2 DIABETES MELLITUS WITH HYPERGLYCEMIA, WITHOUT LONG-TERM CURRENT USE OF INSULIN (HCC): Primary | ICD-10-CM

## 2018-02-14 PROCEDURE — G0108 DIAB MANAGE TRN  PER INDIV: HCPCS | Performed by: INTERNAL MEDICINE

## 2018-02-14 RX ORDER — DAPAGLIFLOZIN 5 MG/1
5 TABLET, FILM COATED ORAL EVERY MORNING
Refills: 0 | COMMUNITY
Start: 2018-01-22 | End: 2018-02-21 | Stop reason: SDUPTHER

## 2018-02-14 NOTE — PATIENT INSTRUCTIONS
1  Use [employer] sebastian to track calories, carbs and other nutrients  2  Follow 1360 calorie low carb meal plan; 15-30 grams carb per meal  3  Continue to check blood sugar using paired testing to find patterns where changes to diet and lifestyle can be made  4   Start exercise program with consultation from PT for proper exercise program

## 2018-02-14 NOTE — PROGRESS NOTES
Medical Nutrition Therapy     Assessment    Visit Type: Follow-up visit  Chief complaint:  Patient is here today for follow up MNT for T2DM and Morbid Obesity    HPI:  Patient has lost 5 lbs in the past month  She stated that she has been following her meal plan and is eating approximately 15-30 grams carb per meal; 45-60 grams per day  She is testing her blood sugars using paired testing and is starting to recognize the impact of her eating on her Bg  Fasting readings are above target of <110 (136-170); Other pre and post prandial readings are close to target  We discussed speaking with her Dr  Regarding re-starting Metformin but ER type which will help lower fasting readings with less GI issues  She stated that she has eliminated bread, pasta, rice, fast foods and sweets  She eats [employer] for snacks which she states is satisfying  Her food history shows a lower carb intake  We reviewed using [employer] sebastian for carb search as well as food diary and tracking of calories and other nutrients  Ht Readings from Last 1 Encounters:   02/14/18 5' 6" (1 676 m)     Wt Readings from Last 1 Encounters:   02/14/18 (!) 139 kg (305 lb 12 8 oz)     Weight Change: Yes -5 lbs    Medical Diagnosis/ICD 10 Code:  E11 65    Barriers to Learning: no barriers    Do you follow any special diet presently?: Yes - Low carb  Who shops: patient and spouse  Who cooks: patient and spouse    Food Log: Completed via the method of food recall    Breakfast:Lite and fit greek strawberry shortcake yogurt, 2 c   Coffee with sugar sub and creamer  Morning Snack:  Lunch:Sausage stuffed pepper or chicken salad with unsw iced tea  Afternoon Snack: Popcorn  Dinner:Meat, asparagus or salad unsw iced tea  Evening Snack:Popcorn  Beverages: Unsw iced tea  Eating out/Take out:  Exercise Starting PT      Nutrition Diagnosis:  Physical inactivity  related to Injury, lifestyle change, condition (i e  advanced stages of cardiovascular disease, obesity), physical disability or limitation that reduces physical activity or activities of daily living as evidenced by Obesity - BMI >30 (adults)    Intervention: behavior modification strategies, carbohydrate counting, meal planning and food diary     Treatment Goals: Patient will count carbohydrates and Patient will monitor blood glucose    Monitoring and evaluation:    Term code indicator  FH 1 2 1 Energy intake, FH 1 6 3 Carbohydrate Intake and CH 1 1 Personal Data Criteria: HbA1c <6 5    Education Material Given  Alfred Rodriguez was provided the following education material: Weight Loss Goal Chart, Weekly Action Plan, Mindless Eating     Patients Response to Instruction  Comprehensionvery good  Motivationvery good  Expected Compliancevery good    Thank you for coming to the [facility] for education today  Please feel free to call with any questions or concerns      Bert Sharma RD,CDE  [address] [facility] [address]

## 2018-02-20 RX ORDER — GABAPENTIN 100 MG/1
CAPSULE ORAL
COMMUNITY
End: 2018-04-30 | Stop reason: SDUPTHER

## 2018-02-21 ENCOUNTER — OFFICE VISIT (OUTPATIENT)
Dept: FAMILY MEDICINE CLINIC | Facility: CLINIC | Age: 63
End: 2018-02-21
Payer: COMMERCIAL

## 2018-02-21 VITALS
WEIGHT: 293 LBS | DIASTOLIC BLOOD PRESSURE: 84 MMHG | HEIGHT: 66 IN | RESPIRATION RATE: 18 BRPM | BODY MASS INDEX: 47.09 KG/M2 | HEART RATE: 104 BPM | OXYGEN SATURATION: 95 % | SYSTOLIC BLOOD PRESSURE: 138 MMHG

## 2018-02-21 DIAGNOSIS — E11.9 TYPE 2 DIABETES MELLITUS WITHOUT COMPLICATION, WITHOUT LONG-TERM CURRENT USE OF INSULIN (HCC): Primary | ICD-10-CM

## 2018-02-21 DIAGNOSIS — I10 ESSENTIAL HYPERTENSION: ICD-10-CM

## 2018-02-21 DIAGNOSIS — R74.8 ABNORMAL LIVER ENZYMES: ICD-10-CM

## 2018-02-21 PROBLEM — E66.01 MORBID OBESITY WITH BMI OF 50.0-59.9, ADULT (HCC): Status: ACTIVE | Noted: 2017-01-30

## 2018-02-21 PROBLEM — F33.1 MAJOR DEPRESSIVE DISORDER, RECURRENT EPISODE, MODERATE (HCC): Status: ACTIVE | Noted: 2017-01-30

## 2018-02-21 PROBLEM — R26.9 GAIT DISORDER: Status: ACTIVE | Noted: 2017-01-30

## 2018-02-21 PROCEDURE — 99213 OFFICE O/P EST LOW 20 MIN: CPT | Performed by: FAMILY MEDICINE

## 2018-02-21 RX ORDER — NYSTATIN 100000 [USP'U]/G
POWDER TOPICAL AS NEEDED
COMMUNITY
Start: 2018-01-08 | End: 2019-01-08 | Stop reason: SDUPTHER

## 2018-02-21 RX ORDER — DAPAGLIFLOZIN 5 MG/1
5 TABLET, FILM COATED ORAL EVERY MORNING
Qty: 90 TABLET | Refills: 3 | Status: SHIPPED | OUTPATIENT
Start: 2018-02-21 | End: 2018-04-30 | Stop reason: SDUPTHER

## 2018-02-21 RX ORDER — METFORMIN HYDROCHLORIDE 500 MG/1
500 TABLET, EXTENDED RELEASE ORAL
Qty: 90 TABLET | Refills: 3 | Status: SHIPPED | OUTPATIENT
Start: 2018-02-21 | End: 2018-04-13 | Stop reason: SDUPTHER

## 2018-02-21 NOTE — ASSESSMENT & PLAN NOTE
Patient's sugar overall seems to be improving  Continue with dietary changes  She also be exercising a bit more with physical therapy  I am going to try a low dose of long-acting metformin to see if she can tolerate this as she had significant flare of her IBS on higher dosing  Continue to monitor her sugar she is doing and notify me if she is not improving  I am going to see her back in April and she will be having an A1c prior to that

## 2018-02-21 NOTE — PROGRESS NOTES
Assessment/Plan:    Type 2 diabetes mellitus without complication (HCC)  Patient's sugar overall seems to be improving  Continue with dietary changes  She also be exercising a bit more with physical therapy  I am going to try a low dose of long-acting metformin to see if she can tolerate this as she had significant flare of her IBS on higher dosing  Continue to monitor her sugar she is doing and notify me if she is not improving  I am going to see her back in April and she will be having an A1c prior to that  Essential hypertension    Blood pressure was a bit high when she 1st arrived but came down throughout the visit  Continue with current medications  She did not take her quinapril yet today  Abnormal liver enzymes    Likely secondary to fatty liver which should improve with her ongoing weight loss  Diagnoses and all orders for this visit:    Type 2 diabetes mellitus without complication, without long-term current use of insulin (HCC)    Essential hypertension    Abnormal liver enzymes    Other orders  -     gabapentin (NEURONTIN) 100 mg capsule; Take by mouth  -     Discontinue: sitaGLIPtin (JANUVIA) 100 mg tablet; Take 1 tablet by mouth daily  -     nystatin (MYCOSTATIN) powder;           Subjective:      Patient ID: Shi Hawk is a 58 y o  female  HPI     Patient presents today for follow-up for type 2 diabetes  She is frustrated as she has been really watching her diet over the past month or so and has only lost about 5 lb  Her sugars do seem to be stabilizing which she still having some spikes up into the 200s  She denies polyuria or polydipsia  she has some chronic fibromyalgia and will be undergoing some physical therapy in the near future        The following portions of the patient's history were reviewed and updated as appropriate: allergies, current medications, past family history, past medical history, past social history, past surgical history and problem list     Review of Systems   Constitutional: Negative for fatigue  Respiratory: Negative for chest tightness and shortness of breath  Cardiovascular: Negative for chest pain  Gastrointestinal: Negative for abdominal distention and abdominal pain  Endocrine: Negative for polydipsia, polyphagia and polyuria  Skin: Negative for pallor  Neurological: Negative for dizziness, tremors, seizures, speech difficulty, weakness and headaches  Psychiatric/Behavioral: Negative for confusion  The patient is not nervous/anxious  Objective:      /84   Pulse 104   Resp 18   Ht 5' 6" (1 676 m)   Wt (!) 138 kg (305 lb)   SpO2 95%   BMI 49 23 kg/m²          Physical Exam   Constitutional: She is oriented to person, place, and time  She appears well-developed and well-nourished  No distress  HENT:   Head: Normocephalic  Eyes: Pupils are equal, round, and reactive to light  Neck: No tracheal deviation present  No thyromegaly present  Cardiovascular: Normal rate, regular rhythm and normal heart sounds  No murmur heard  Pulmonary/Chest: Effort normal  No respiratory distress  She has no wheezes  She has no rales  Abdominal: Soft  She exhibits no distension  There is no tenderness  Musculoskeletal: Normal range of motion  Neurological: She is alert and oriented to person, place, and time  Skin: Skin is warm  She is not diaphoretic  Psychiatric: She has a normal mood and affect   Judgment and thought content normal

## 2018-02-21 NOTE — ASSESSMENT & PLAN NOTE
Blood pressure was a bit high when she 1st arrived but came down throughout the visit  Continue with current medications  She did not take her quinapril yet today

## 2018-04-09 DIAGNOSIS — E11.9 TYPE 2 DIABETES MELLITUS WITHOUT COMPLICATION, WITHOUT LONG-TERM CURRENT USE OF INSULIN (HCC): ICD-10-CM

## 2018-04-09 RX ORDER — METFORMIN HYDROCHLORIDE 500 MG/1
500 TABLET, EXTENDED RELEASE ORAL
Qty: 90 TABLET | Refills: 0 | OUTPATIENT
Start: 2018-04-09

## 2018-04-09 NOTE — TELEPHONE ENCOUNTER
Pt called requesting refill of metformin but she states that she needs the rx for 1000mg daily  Pt had a script for 500mg daily but has been doubling up  I looked over your notes but do not see anything regarding increasing this dose - is pt supposed to be taking 500mg or 1000mg?

## 2018-04-10 NOTE — TELEPHONE ENCOUNTER
The patient had concerns about diarrhea with iron dosing  Please confirm that she wants to go back up to 1000 mg    If so, I am okay with it, but she will need to watch of for recurrent diarrhea

## 2018-04-13 DIAGNOSIS — E11.9 TYPE 2 DIABETES MELLITUS WITHOUT COMPLICATION, WITHOUT LONG-TERM CURRENT USE OF INSULIN (HCC): ICD-10-CM

## 2018-04-13 RX ORDER — METFORMIN HYDROCHLORIDE 1000 MG/1
1000 TABLET, FILM COATED, EXTENDED RELEASE ORAL
Qty: 30 TABLET | Refills: 5 | Status: CANCELLED | OUTPATIENT
Start: 2018-04-13

## 2018-04-14 RX ORDER — METFORMIN HYDROCHLORIDE 1000 MG/1
1000 TABLET, FILM COATED, EXTENDED RELEASE ORAL
Qty: 30 TABLET | Refills: 5 | Status: SHIPPED | OUTPATIENT
Start: 2018-04-14 | End: 2018-04-30 | Stop reason: SDUPTHER

## 2018-04-20 DIAGNOSIS — E11.9 TYPE 2 DIABETES MELLITUS WITHOUT COMPLICATIONS (HCC): ICD-10-CM

## 2018-04-23 ENCOUNTER — APPOINTMENT (OUTPATIENT)
Dept: LAB | Age: 63
End: 2018-04-23
Payer: COMMERCIAL

## 2018-04-23 DIAGNOSIS — E11.9 TYPE 2 DIABETES MELLITUS WITHOUT COMPLICATIONS (HCC): ICD-10-CM

## 2018-04-23 LAB
ALBUMIN SERPL BCP-MCNC: 3.7 G/DL (ref 3.5–5)
ALP SERPL-CCNC: 61 U/L (ref 46–116)
ALT SERPL W P-5'-P-CCNC: 29 U/L (ref 12–78)
ANION GAP SERPL CALCULATED.3IONS-SCNC: 5 MMOL/L (ref 4–13)
AST SERPL W P-5'-P-CCNC: 19 U/L (ref 5–45)
BILIRUB SERPL-MCNC: 0.37 MG/DL (ref 0.2–1)
BUN SERPL-MCNC: 17 MG/DL (ref 5–25)
CALCIUM SERPL-MCNC: 9.5 MG/DL (ref 8.3–10.1)
CHLORIDE SERPL-SCNC: 107 MMOL/L (ref 100–108)
CO2 SERPL-SCNC: 27 MMOL/L (ref 21–32)
CREAT SERPL-MCNC: 0.7 MG/DL (ref 0.6–1.3)
EST. AVERAGE GLUCOSE BLD GHB EST-MCNC: 151 MG/DL
GFR SERPL CREATININE-BSD FRML MDRD: 93 ML/MIN/1.73SQ M
GLUCOSE P FAST SERPL-MCNC: 131 MG/DL (ref 65–99)
HBA1C MFR BLD: 6.9 % (ref 4.2–6.3)
POTASSIUM SERPL-SCNC: 4.8 MMOL/L (ref 3.5–5.3)
PROT SERPL-MCNC: 7.2 G/DL (ref 6.4–8.2)
SODIUM SERPL-SCNC: 139 MMOL/L (ref 136–145)

## 2018-04-23 PROCEDURE — 36415 COLL VENOUS BLD VENIPUNCTURE: CPT

## 2018-04-23 PROCEDURE — 80053 COMPREHEN METABOLIC PANEL: CPT

## 2018-04-23 PROCEDURE — 83036 HEMOGLOBIN GLYCOSYLATED A1C: CPT

## 2018-04-27 RX ORDER — CIPROFLOXACIN 250 MG/1
TABLET, FILM COATED ORAL
Refills: 0 | COMMUNITY
Start: 2018-04-17 | End: 2018-04-30 | Stop reason: ALTCHOICE

## 2018-04-30 ENCOUNTER — OFFICE VISIT (OUTPATIENT)
Dept: FAMILY MEDICINE CLINIC | Facility: CLINIC | Age: 63
End: 2018-04-30
Payer: COMMERCIAL

## 2018-04-30 VITALS
DIASTOLIC BLOOD PRESSURE: 90 MMHG | WEIGHT: 291 LBS | BODY MASS INDEX: 46.77 KG/M2 | HEIGHT: 66 IN | RESPIRATION RATE: 18 BRPM | SYSTOLIC BLOOD PRESSURE: 132 MMHG | OXYGEN SATURATION: 96 % | HEART RATE: 88 BPM

## 2018-04-30 DIAGNOSIS — R74.8 ABNORMAL LIVER ENZYMES: ICD-10-CM

## 2018-04-30 DIAGNOSIS — M79.7 FIBROMYALGIA: ICD-10-CM

## 2018-04-30 DIAGNOSIS — Z00.00 WELL ADULT ON ROUTINE HEALTH CHECK: Primary | ICD-10-CM

## 2018-04-30 DIAGNOSIS — E11.9 TYPE 2 DIABETES MELLITUS WITHOUT COMPLICATION, WITHOUT LONG-TERM CURRENT USE OF INSULIN (HCC): ICD-10-CM

## 2018-04-30 DIAGNOSIS — R26.9 GAIT DISORDER: ICD-10-CM

## 2018-04-30 DIAGNOSIS — Z23 NEED FOR ZOSTER VACCINE: ICD-10-CM

## 2018-04-30 DIAGNOSIS — Z12.31 ENCOUNTER FOR MAMMOGRAM TO ESTABLISH BASELINE MAMMOGRAM: ICD-10-CM

## 2018-04-30 PROBLEM — M17.9 OSTEOARTHRITIS OF KNEE: Status: ACTIVE | Noted: 2018-04-30

## 2018-04-30 PROBLEM — M17.10 OSTEOARTHRITIS OF KNEE: Status: ACTIVE | Noted: 2018-04-30

## 2018-04-30 PROBLEM — M16.9 OSTEOARTHRITIS OF HIP: Status: ACTIVE | Noted: 2018-04-30

## 2018-04-30 PROBLEM — M19.079 PRIMARY LOCALIZED OSTEOARTHROSIS OF ANKLE AND FOOT: Status: ACTIVE | Noted: 2018-04-30

## 2018-04-30 PROBLEM — Q66.6 CONGENITAL VALGUS DEFORMITY OF FOOT: Status: ACTIVE | Noted: 2018-04-30

## 2018-04-30 PROBLEM — M76.829 TIBIALIS TENDINITIS: Status: ACTIVE | Noted: 2018-04-30

## 2018-04-30 PROCEDURE — 3725F SCREEN DEPRESSION PERFORMED: CPT | Performed by: FAMILY MEDICINE

## 2018-04-30 PROCEDURE — 99396 PREV VISIT EST AGE 40-64: CPT | Performed by: FAMILY MEDICINE

## 2018-04-30 RX ORDER — GABAPENTIN 100 MG/1
300 CAPSULE ORAL
Refills: 0
Start: 2018-04-30 | End: 2018-06-25

## 2018-04-30 RX ORDER — METFORMIN HYDROCHLORIDE 1000 MG/1
1000 TABLET, FILM COATED, EXTENDED RELEASE ORAL
Qty: 90 TABLET | Refills: 3 | Status: SHIPPED | OUTPATIENT
Start: 2018-04-30 | End: 2018-10-25 | Stop reason: ALTCHOICE

## 2018-04-30 RX ORDER — DAPAGLIFLOZIN 5 MG/1
5 TABLET, FILM COATED ORAL EVERY MORNING
Qty: 90 TABLET | Refills: 3 | Status: SHIPPED | OUTPATIENT
Start: 2018-04-30 | End: 2019-04-02 | Stop reason: SDUPTHER

## 2018-04-30 NOTE — ASSESSMENT & PLAN NOTE
She is on no medications and doing quite well  She has done some talk therapy in the past   Continue to monitor

## 2018-04-30 NOTE — PROGRESS NOTES
FAMILY PRACTICE HEALTH MAINTENANCE OFFICE VISIT  [facility]    NAME: Amparo Lala  AGE: 58 y o  SEX: female  : 1955     DATE: 2018    Assessment and Plan     Problem List Items Addressed This Visit     Abnormal liver enzymes    Type 2 diabetes mellitus without complication (HCC)    Relevant Medications    metFORMIN (GLUMETZA) 1000 MG (MOD) 24 hr tablet    FARXIGA 5 MG TABS    Other Relevant Orders    Comprehensive metabolic panel    TSH, 3rd generation with T4 reflex    HEMOGLOBIN A1C W/ EAG ESTIMATION    Lipid Panel with Direct LDL reflex    Fibromyalgia     Continue to follow closely with Rheumatology  She is now on gabapentin         Relevant Medications    gabapentin (NEURONTIN) 100 mg capsule    Gait disorder    Relevant Orders    Comprehensive metabolic panel    Body mass index 45 0-49 9, adult ([facility] [address] )     She has been losing some weight  Continue with [place]j [place] and metformin  Check labs in about 4 months and follow up after that  Relevant Orders    TSH, 3rd generation with T4 reflex      Other Visit Diagnoses     Well adult on routine health check    -  Primary    Encounter for mammogram to establish baseline mammogram        Relevant Orders    Mammo screening bilateral w 3d & cad    Need for zoster vaccine        Relevant Medications    SHINGRIX 50 MCG SUSR       Patient is doing well at this point  She has lost weight and her A1c is heading in the right direction  Make no medication changes at this time  She will continue with Rheumatology follow-up for her chronic pain syndrome consistent with fibromyalgia  Check labs in about 4 months time in follow-up after that  She is up-to-date on colonoscopy and mammogram screening  She was given a prescription for mammogram today  She was also given a prescription for the new shingles vaccine, Shingrix        · Patient Counseling:   · Nutrition: Stressed importance of a well balanced diet, moderation of sodium/saturated fat, caloric balance and sufficient intake of fiber  · Exercise: Stressed the importance of regular exercise with a goal of 150 minutes per week  · Dental Health: Discussed daily flossing and brushing and regular dental visits     · Immunizations reviewed  UTD except Shingrix  · Discussed benefits of screening mammogram and colonoscopy up-to-date  · Discussed the patient's BMI with her  The BMI is above average; BMI management plan is completedContinue to work on wt loss  No Follow-up on file  Chief Complaint     Chief Complaint   Patient presents with    Physical Exam       History of Present Illness     HPI    Patient presents today for annual physical   She is feeling relatively well  She continues to deal with significant chronic pain of her right ankle as well as some diffuse pain concerning for fibromyalgia  She is now taking gabapentin at bedtime which has been somewhat helpful  She has history of type 2 diabetes  A1c has improved  She also has lost some weight  She is undergoing physical therapy which has been helpful for exercise and watching her diet  She has a remote history of significant depression which has been treated with talk therapy and no medications  She denies depressed mood or anxiety currently  She is morbidly obese, but her BMI is moving in the right direction      Well Adult Physical   Patient here for a comprehensive physical exam       Diet and Physical Activity  Diet: Diabetic diet  Weight concerns: Patient has class 3 obesity (BMI >40)  Exercise: rarely      Depression Screen  PHQ-9 Depression Screening    PHQ-9:    Frequency of the following problems over the past two weeks:       Little interest or pleasure in doing things:  0 - not at all  Feeling down, depressed, or hopeless:  0 - not at all  PHQ-2 Score:  0          General Health  Hearing: Normal:  bilateral  Vision: no vision problems  Dental: regular dental visits    Reproductive Health  No concerns today  The following portions of the patient's history were reviewed and updated as appropriate: allergies, current medications, past family history, past medical history, past social history, past surgical history and problem list     Review of Systems     Review of Systems   Constitutional: Negative for appetite change, chills, fatigue, fever and unexpected weight change  HENT: Negative for trouble swallowing  Eyes: Negative for visual disturbance  Respiratory: Negative for cough, chest tightness, shortness of breath and wheezing  Cardiovascular: Negative for chest pain  Gastrointestinal: Negative for abdominal distention, abdominal pain, blood in stool, constipation and diarrhea  Endocrine: Negative for polyuria  Genitourinary: Negative for difficulty urinating and flank pain  Musculoskeletal: Negative for arthralgias and myalgias  Skin: Negative for rash  Neurological: Negative for dizziness and light-headedness  Hematological: Negative for adenopathy  Does not bruise/bleed easily  Psychiatric/Behavioral: Negative for sleep disturbance  Past Medical History     Past Medical History:   Diagnosis Date    Anemia     last assessed: 2015    Depression     Diabetes mellitus ([email or web address] )     Dysfunction of eustachian tube     unspecified laterality; last assessed: 2013    Endometriosis     Fibromyalgia     Hyperlipidemia     Hypertension     IBS (irritable bowel syndrome)     Morbid obesity ([facility] [address] )     Vitamin D deficiency        Past Surgical History     Past Surgical History:   Procedure Laterality Date    ANKLE SURGERY      , , -extensive surgery     SECTION      x2    JOINT REPLACEMENT      hip    LUMBAR EPIDURAL INJECTION  12/2005    x 1    TN COLONOSCOPY FLX DX W/COLLJ SPEC WHEN PFRMD N/A 8/3/2017    Procedure: COLONOSCOPY with polypectomy;  Surgeon: Triston Ortiz MD;  Location: [facility];   Service: Gastroenterology    TOTAL HIP ARTHROPLASTY Left 01/2013       Social History     Social History     Social History    Marital status: /Civil Union     Spouse name: N/A    Number of children: N/A    Years of education: N/A     Social History Main Topics    Smoking status: Never Smoker    Smokeless tobacco: Never Used    Alcohol use Yes      Comment: socail;  5 drinks weekly (as per [employer])    Drug use: Unknown    Sexual activity: Not on file     Other Topics Concern    Not on file     Social History Narrative    No narrative on file       Family History     Family History   Problem Relation Age of Onset    Other Mother      CABG    Other Father      gangrene       Current Medications       Current Outpatient Prescriptions:     Cholecalciferol (VITAMIN D) 2000 units tablet, Take 2,000 Units by mouth daily, Disp: , Rfl:     diclofenac-misoprostol (ARTHROTEC) 75-0 2 MG per tablet, Take 1 tablet by mouth 2 (two) times a day, Disp: , Rfl:     FARXIGA 5 MG TABS, Take 1 tablet (5 mg total) by mouth every morning, Disp: 90 tablet, Rfl: 3    fexofenadine (ALLEGRA) 180 MG tablet, Take 180 mg by mouth daily, Disp: , Rfl:     fluticasone (FLONASE) 50 mcg/act nasal spray, 1 spray into each nostril daily, Disp: , Rfl:     gabapentin (NEURONTIN) 100 mg capsule, Take 3 capsules (300 mg total) by mouth daily at bedtime, Disp: , Rfl: 0    metFORMIN (GLUMETZA) 1000 MG (MOD) 24 hr tablet, Take 1 tablet (1,000 mg total) by mouth daily with breakfast, Disp: 90 tablet, Rfl: 3    nystatin (MYCOSTATIN) powder, , Disp: , Rfl:     quinapril (ACCUPRIL) 40 MG tablet, Take 40 mg by mouth daily at bedtime, Disp: , Rfl:     simvastatin (ZOCOR) 40 mg tablet, Take 40 mg by mouth daily at bedtime, Disp: , Rfl:     SHINGRIX 50 MCG SUSR, Inject 0 5 mL into the shoulder, thigh, or buttocks once for 1 dose, Disp: 1 each, Rfl: 0     Allergies     Allergies   Allergen Reactions    Augmentin [Amoxicillin-Pot Clavulanate] Nausea Only     Category: nausea,diarrhea;     Azithromycin Nausea Only    Doxycycline     Erythromycin     Lodine [Etodolac]      Category: rash;     Lorazepam      Annotation - [id number]: mental status changes    Sulfa Antibiotics      Category: rash;        Objective     /90   Pulse 88   Resp 18   Ht 5' 6" (1 676 m)   Wt 132 kg (291 lb)   SpO2 96%   BMI 46 97 kg/m²      Physical Exam   Constitutional: She is oriented to person, place, and time  She appears well-developed and well-nourished  No distress  HENT:   Head: Normocephalic  Eyes: Pupils are equal, round, and reactive to light  Neck: No tracheal deviation present  No thyromegaly present  Cardiovascular: Normal rate, regular rhythm and normal heart sounds  No murmur heard  Pulmonary/Chest: Effort normal  No respiratory distress  She has no wheezes  She has no rales  Abdominal: Soft  She exhibits no distension  There is no tenderness  Musculoskeletal: Normal range of motion  Neurological: She is alert and oriented to person, place, and time  No cranial nerve deficit  Skin: Skin is warm  She is not diaphoretic  Psychiatric: She has a normal mood and affect   Judgment and thought content normal          No exam data present    Health Maintenance     Health Maintenance   Topic Date Due    HIV SCREENING  1955    DTaP,Tdap,and Td Vaccines (1 - Tdap) 05/11/2002    OPHTHALMOLOGY EXAM  04/26/2017    URINE MICROALBUMIN  01/25/2018    Diabetic Foot Exam  01/30/2018    MAMMOGRAM  05/05/2018    HEMOGLOBIN A1C  10/23/2018    Depression Screening PHQ-9  04/30/2019    DXA SCAN  09/26/2019    COLONOSCOPY  08/03/2022    Hepatitis C Screening  Completed    INFLUENZA VACCINE  Completed    PNEUMOCOCCAL POLYSACCHARIDE VACCINE AGE 2-64 HIGH RISK  Completed     Immunization History   Administered Date(s) Administered    Influenza 10/14/2009, 09/23/2015, 01/05/2017, 12/20/2017    Influenza Quadrivalent Preservative Free 3 years and older IM 12/23/2014, 01/05/2017, 12/20/2017    Influenza Quadrivalent, 6-35 Months IM 09/23/2015    Influenza TIV (IM) 09/30/2011, 10/09/2012    Pneumococcal Polysaccharide PPV23 12/15/2010    Td (adult), adsorbed 05/10/2002       MD Castillo Quarles

## 2018-04-30 NOTE — ASSESSMENT & PLAN NOTE
She has been losing some weight  Continue with [place] and metformin  Check labs in about 4 months and follow up after that

## 2018-05-04 ENCOUNTER — DOCUMENTATION (OUTPATIENT)
Dept: FAMILY MEDICINE CLINIC | Facility: CLINIC | Age: 63
End: 2018-05-04

## 2018-05-10 ENCOUNTER — TRANSCRIBE ORDERS (OUTPATIENT)
Dept: RADIOLOGY | Facility: CLINIC | Age: 63
End: 2018-05-10

## 2018-05-15 ENCOUNTER — HOSPITAL ENCOUNTER (OUTPATIENT)
Dept: RADIOLOGY | Age: 63
Discharge: HOME | End: 2018-05-15
Payer: COMMERCIAL

## 2018-05-15 DIAGNOSIS — Z12.31 ENCOUNTER FOR MAMMOGRAM TO ESTABLISH BASELINE MAMMOGRAM: ICD-10-CM

## 2018-05-15 PROCEDURE — 77067 SCR MAMMO BI INCL CAD: CPT

## 2018-05-15 PROCEDURE — 77063 BREAST TOMOSYNTHESIS BI: CPT

## 2018-06-25 ENCOUNTER — OFFICE VISIT (OUTPATIENT)
Dept: FAMILY MEDICINE CLINIC | Facility: CLINIC | Age: 63
End: 2018-06-25
Payer: COMMERCIAL

## 2018-06-25 VITALS
HEART RATE: 82 BPM | DIASTOLIC BLOOD PRESSURE: 80 MMHG | OXYGEN SATURATION: 98 % | WEIGHT: 285.38 LBS | TEMPERATURE: 98.6 F | BODY MASS INDEX: 46.06 KG/M2 | SYSTOLIC BLOOD PRESSURE: 118 MMHG

## 2018-06-25 DIAGNOSIS — G47.01 INSOMNIA DUE TO MEDICAL CONDITION: ICD-10-CM

## 2018-06-25 DIAGNOSIS — M79.7 FIBROMYALGIA: Primary | ICD-10-CM

## 2018-06-25 PROCEDURE — 99213 OFFICE O/P EST LOW 20 MIN: CPT | Performed by: PHYSICIAN ASSISTANT

## 2018-06-25 RX ORDER — GABAPENTIN 300 MG/1
300 CAPSULE ORAL 2 TIMES DAILY
COMMUNITY
End: 2018-09-14 | Stop reason: SDUPTHER

## 2018-06-25 RX ORDER — TRAZODONE HYDROCHLORIDE 50 MG/1
50 TABLET ORAL
Qty: 30 TABLET | Refills: 2 | Status: SHIPPED | OUTPATIENT
Start: 2018-06-25 | End: 2018-07-24 | Stop reason: SDUPTHER

## 2018-06-25 RX ORDER — AMOXICILLIN 500 MG/1
4 CAPSULE ORAL
COMMUNITY
Start: 2014-12-31 | End: 2018-07-09 | Stop reason: SDUPTHER

## 2018-06-25 NOTE — PROGRESS NOTES
[facility]  FAMILY PRACTICE ACUTE OFFICE VISIT       NAME: Ced Sims  AGE: 61 y o  SEX: female       : 1955        MRN: [id number]    DATE: 2018  TIME: 7:15 PM    Assessment and Plan     Problem List Items Addressed This Visit     Fibromyalgia - Primary     Discussed trying an increase in gabapentin to 300 mg TID  Also referred to a new rheumatologist since she was unhappy with previous provider  Encouraged to continue with regular activity and exercise as tolerated  Follow-up in 3 months as scheduled with Dr Jocelyne Juarez, but she should call if concerns in the interim  Relevant Medications    traZODone (DESYREL) 50 mg tablet    Other Relevant Orders    Ambulatory referral to Rheumatology    Insomnia due to medical condition       Given trazodone to use at bedtime as needed to help with sleep  Insomnia due to medical condition    Given trazodone to use at bedtime as needed to help with sleep  Fibromyalgia  Discussed trying an increase in gabapentin to 300 mg TID  Also referred to a new rheumatologist since she was unhappy with previous provider  Encouraged to continue with regular activity and exercise as tolerated  Follow-up in 3 months as scheduled with Dr Jocelyne Juarez, but she should call if concerns in the interim  Chief Complaint     Chief Complaint   Patient presents with    Arm Pain     right arm x 6 days        History of Present Illness   Ced Sims is a 61y o -year-old female who presents for right arm pain       Notes that she has fibromyalgia - sees Dr Jennifer Eric for it and has trouble with the right arm - has done PT and used heat but now having a flare - notes that it is better with any activity but worse at rest - notes that she does not want to return to Dr Jaylin Merrill due to poor bedside manner - notes that she was on gabapentin 300 mg at bedtime and was doing PT - notes that it was increased to twice daily - there has been no improvement with it, especially this past week - notes that it is bad at bed and worse in the morning - is throbbing and burning as well as aching - has tried stretching it out - notes that the neck and shoulder are better but still painful in the upper arm - notes the pain brings her to tears - feels like this is the same pain she usually gets with flares - she denies numbness or tingling/weakness - notes that these flares happen rarely and denies any trigger for a flare - notes that she does regularly work out at [employer] but she notes that she can only use 2 machines - wants something to help her sleep as this has been her biggest problem with the flare      Arm Pain    Pertinent negatives include no numbness  Review of Systems   Review of Systems   Musculoskeletal:        Right arm pain   Neurological: Negative for weakness and numbness  Psychiatric/Behavioral: Positive for sleep disturbance  Active Problem List     Patient Active Problem List   Diagnosis    Abnormal liver enzymes    Ankle pain, right    Type 2 diabetes mellitus without complication (HCC)    Essential tremor    Fibromyalgia    Gait disorder    Pure hypercholesterolemia    Essential hypertension    Irritable bowel syndrome    Lower back pain    Major depressive disorder, recurrent episode, moderate (HCC)    Body mass index 45 0-49 9, adult ([facility])    Rosacea    Urinary incontinence    Vitamin D deficiency    Congenital valgus deformity of foot    Primary localized osteoarthrosis of ankle and foot    Osteoarthritis of hip    Osteoarthritis of knee    Tibialis tendinitis    History of total hip replacement    Insomnia due to medical condition         Social History:  Social History     Social History    Marital status: /Civil Union     Spouse name: N/A    Number of children: N/A    Years of education: N/A     Occupational History    Not on file       Social History Main Topics    Smoking status: Never Smoker    Smokeless tobacco: Never Used    Alcohol use Yes      Comment: socail;  5 drinks weekly (as per [employer])    Drug use: Unknown    Sexual activity: Not on file     Other Topics Concern    Not on file     Social History Narrative    No narrative on file       Objective     Vitals:    06/25/18 1350   BP: 118/80   Pulse: 82   Temp: 98 6 °F (37 °C)   SpO2: 98%     Wt Readings from Last 3 Encounters:   06/25/18 129 kg (285 lb 6 oz)   04/30/18 132 kg (291 lb)   02/21/18 (!) 138 kg (305 lb)       Physical Exam   Constitutional: She appears well-developed and well-nourished  No distress  Neck: Neck supple  No thyromegaly present  Cardiovascular: Normal rate, regular rhythm, normal heart sounds and intact distal pulses  No murmur heard  Pulmonary/Chest: Effort normal and breath sounds normal  She has no wheezes  She has no rales  Musculoskeletal: She exhibits tenderness (slight TTP over the posterior right upper arm but not neck or shoulder)  FROM in neck without pain, no neck TTP   Lymphadenopathy:     She has no cervical adenopathy  Psychiatric: She has a normal mood and affect  Her behavior is normal    Vitals reviewed        Pertinent Laboratory/Diagnostic Studies:  Results for orders placed or performed in visit on 04/23/18   Hemoglobin A1c   Result Value Ref Range    Hemoglobin A1C 6 9 (H) 4 2 - 6 3 %     mg/dl   Comprehensive metabolic panel   Result Value Ref Range    Sodium 139 136 - 145 mmol/L    Potassium 4 8 3 5 - 5 3 mmol/L    Chloride 107 100 - 108 mmol/L    CO2 27 21 - 32 mmol/L    Anion Gap 5 4 - 13 mmol/L    BUN 17 5 - 25 mg/dL    Creatinine 0 70 0 60 - 1 30 mg/dL    Glucose, Fasting 131 (H) 65 - 99 mg/dL    Calcium 9 5 8 3 - 10 1 mg/dL    AST 19 5 - 45 U/L    ALT 29 12 - 78 U/L    Alkaline Phosphatase 61 46 - 116 U/L    Total Protein 7 2 6 4 - 8 2 g/dL    Albumin 3 7 3 5 - 5 0 g/dL    Total Bilirubin 0 37 0 20 - 1 00 mg/dL    eGFR 93 ml/min/1 73sq m       Orders Placed This Encounter   Procedures    Ambulatory referral to Rheumatology       ALLERGIES:  Allergies   Allergen Reactions    Augmentin [Amoxicillin-Pot Clavulanate] Nausea Only     Category: nausea,diarrhea;     Azithromycin Nausea Only    Doxycycline GI Intolerance    Erythromycin GI Intolerance    Lodine [Etodolac]      Category: rash;     Lorazepam      Annotation - [id number]: mental status changes    Sulfa Antibiotics      Category: rash;        Current Medications     Current Outpatient Prescriptions   Medication Sig Dispense Refill    amoxicillin (AMOXIL) 500 mg capsule Take 4 capsules by mouth Prior to dental procedures      Cholecalciferol (VITAMIN D) 2000 units tablet Take 2,000 Units by mouth daily      diclofenac-misoprostol (ARTHROTEC) 75-0 2 MG per tablet Take 1 tablet by mouth 2 (two) times a day      FARXIGA 5 MG TABS Take 1 tablet (5 mg total) by mouth every morning 90 tablet 3    fexofenadine (ALLEGRA) 180 MG tablet Take 180 mg by mouth daily      fluticasone (FLONASE) 50 mcg/act nasal spray 1 spray into each nostril daily      gabapentin (NEURONTIN) 300 mg capsule Take 300 mg by mouth 2 (two) times a day      metFORMIN (GLUMETZA) 1000 MG (MOD) 24 hr tablet Take 1 tablet (1,000 mg total) by mouth daily with breakfast 90 tablet 3    nystatin (MYCOSTATIN) powder Apply topically as needed        quinapril (ACCUPRIL) 40 MG tablet Take 40 mg by mouth daily at bedtime      simvastatin (ZOCOR) 40 mg tablet Take 40 mg by mouth daily at bedtime      traZODone (DESYREL) 50 mg tablet Take 1 tablet (50 mg total) by mouth daily at bedtime as needed for sleep 30 tablet 2     No current facility-administered medications for this visit            Indu Law PA-C  6/25/2018 7:15 PM  [facility] REYNAGA [facility]

## 2018-07-09 DIAGNOSIS — Z98.818 OTHER DENTAL PROCEDURE STATUS: Primary | ICD-10-CM

## 2018-07-09 DIAGNOSIS — Z96.649 HISTORY OF TOTAL HIP REPLACEMENT, UNSPECIFIED LATERALITY: ICD-10-CM

## 2018-07-09 RX ORDER — AMOXICILLIN 500 MG/1
CAPSULE ORAL
Qty: 4 CAPSULE | Refills: 0 | Status: SHIPPED | OUTPATIENT
Start: 2018-07-09 | End: 2018-07-11

## 2018-07-24 DIAGNOSIS — M79.7 FIBROMYALGIA: ICD-10-CM

## 2018-07-24 DIAGNOSIS — G47.01 INSOMNIA DUE TO MEDICAL CONDITION: Primary | ICD-10-CM

## 2018-07-24 RX ORDER — TRAZODONE HYDROCHLORIDE 50 MG/1
50 TABLET ORAL
Qty: 90 TABLET | Refills: 0 | Status: SHIPPED | OUTPATIENT
Start: 2018-07-24 | End: 2018-09-14 | Stop reason: SDUPTHER

## 2018-07-27 DIAGNOSIS — E78.00 HYPERCHOLESTEREMIA: ICD-10-CM

## 2018-07-27 DIAGNOSIS — I10 ESSENTIAL HYPERTENSION: Primary | ICD-10-CM

## 2018-07-31 PROCEDURE — 4010F ACE/ARB THERAPY RXD/TAKEN: CPT | Performed by: FAMILY MEDICINE

## 2018-07-31 RX ORDER — SIMVASTATIN 40 MG
TABLET ORAL
Qty: 90 TABLET | Refills: 3 | Status: SHIPPED | OUTPATIENT
Start: 2018-07-31 | End: 2019-06-27 | Stop reason: SDUPTHER

## 2018-07-31 RX ORDER — QUINAPRIL 40 MG/1
TABLET ORAL
Qty: 90 TABLET | Refills: 3 | Status: SHIPPED | OUTPATIENT
Start: 2018-07-31 | End: 2019-06-27 | Stop reason: SDUPTHER

## 2018-08-20 LAB
LEFT EYE DIABETIC RETINOPATHY: NORMAL
RIGHT EYE DIABETIC RETINOPATHY: NORMAL

## 2018-08-20 PROCEDURE — 3072F LOW RISK FOR RETINOPATHY: CPT | Performed by: FAMILY MEDICINE

## 2018-09-11 ENCOUNTER — APPOINTMENT (OUTPATIENT)
Dept: LAB | Age: 63
End: 2018-09-11
Payer: COMMERCIAL

## 2018-09-11 DIAGNOSIS — R26.9 GAIT DISORDER: ICD-10-CM

## 2018-09-11 DIAGNOSIS — E11.9 TYPE 2 DIABETES MELLITUS WITHOUT COMPLICATION, WITHOUT LONG-TERM CURRENT USE OF INSULIN (HCC): ICD-10-CM

## 2018-09-11 LAB
ALBUMIN SERPL BCP-MCNC: 3.4 G/DL (ref 3.5–5)
ALP SERPL-CCNC: 62 U/L (ref 46–116)
ALT SERPL W P-5'-P-CCNC: 28 U/L (ref 12–78)
ANION GAP SERPL CALCULATED.3IONS-SCNC: 7 MMOL/L (ref 4–13)
AST SERPL W P-5'-P-CCNC: 15 U/L (ref 5–45)
BILIRUB SERPL-MCNC: 0.42 MG/DL (ref 0.2–1)
BUN SERPL-MCNC: 16 MG/DL (ref 5–25)
CALCIUM SERPL-MCNC: 9.2 MG/DL (ref 8.3–10.1)
CHLORIDE SERPL-SCNC: 104 MMOL/L (ref 100–108)
CHOLEST SERPL-MCNC: 122 MG/DL (ref 50–200)
CO2 SERPL-SCNC: 27 MMOL/L (ref 21–32)
CREAT SERPL-MCNC: 0.75 MG/DL (ref 0.6–1.3)
EST. AVERAGE GLUCOSE BLD GHB EST-MCNC: 148 MG/DL
GFR SERPL CREATININE-BSD FRML MDRD: 85 ML/MIN/1.73SQ M
GLUCOSE P FAST SERPL-MCNC: 132 MG/DL (ref 65–99)
HBA1C MFR BLD: 6.8 % (ref 4.2–6.3)
HDLC SERPL-MCNC: 53 MG/DL (ref 40–60)
LDLC SERPL CALC-MCNC: 52 MG/DL (ref 0–100)
POTASSIUM SERPL-SCNC: 4.3 MMOL/L (ref 3.5–5.3)
PROT SERPL-MCNC: 7 G/DL (ref 6.4–8.2)
SODIUM SERPL-SCNC: 138 MMOL/L (ref 136–145)
TRIGL SERPL-MCNC: 83 MG/DL
TSH SERPL DL<=0.05 MIU/L-ACNC: 2.53 UIU/ML (ref 0.36–3.74)

## 2018-09-11 PROCEDURE — 80053 COMPREHEN METABOLIC PANEL: CPT

## 2018-09-11 PROCEDURE — 84443 ASSAY THYROID STIM HORMONE: CPT

## 2018-09-11 PROCEDURE — 83036 HEMOGLOBIN GLYCOSYLATED A1C: CPT

## 2018-09-11 PROCEDURE — 36415 COLL VENOUS BLD VENIPUNCTURE: CPT

## 2018-09-11 PROCEDURE — 80061 LIPID PANEL: CPT

## 2018-09-13 RX ORDER — CIPROFLOXACIN 250 MG/1
TABLET, FILM COATED ORAL
COMMUNITY
Start: 2018-09-10 | End: 2019-01-18 | Stop reason: ALTCHOICE

## 2018-09-14 ENCOUNTER — OFFICE VISIT (OUTPATIENT)
Dept: FAMILY MEDICINE CLINIC | Facility: CLINIC | Age: 63
End: 2018-09-14
Payer: COMMERCIAL

## 2018-09-14 VITALS
RESPIRATION RATE: 18 BRPM | TEMPERATURE: 98 F | HEIGHT: 66 IN | BODY MASS INDEX: 46.35 KG/M2 | HEART RATE: 96 BPM | SYSTOLIC BLOOD PRESSURE: 124 MMHG | DIASTOLIC BLOOD PRESSURE: 86 MMHG | WEIGHT: 288.4 LBS

## 2018-09-14 DIAGNOSIS — E11.9 TYPE 2 DIABETES MELLITUS WITHOUT COMPLICATION, WITHOUT LONG-TERM CURRENT USE OF INSULIN (HCC): Primary | ICD-10-CM

## 2018-09-14 DIAGNOSIS — Z23 NEED FOR TDAP VACCINATION: ICD-10-CM

## 2018-09-14 DIAGNOSIS — F33.1 MAJOR DEPRESSIVE DISORDER, RECURRENT EPISODE, MODERATE (HCC): ICD-10-CM

## 2018-09-14 DIAGNOSIS — Z23 FLU VACCINE NEED: ICD-10-CM

## 2018-09-14 DIAGNOSIS — Z23 NEED FOR ZOSTER VACCINE: ICD-10-CM

## 2018-09-14 DIAGNOSIS — E78.00 PURE HYPERCHOLESTEROLEMIA: ICD-10-CM

## 2018-09-14 DIAGNOSIS — I10 ESSENTIAL HYPERTENSION: ICD-10-CM

## 2018-09-14 DIAGNOSIS — R74.8 ABNORMAL LIVER ENZYMES: ICD-10-CM

## 2018-09-14 DIAGNOSIS — G47.01 INSOMNIA DUE TO MEDICAL CONDITION: ICD-10-CM

## 2018-09-14 DIAGNOSIS — M79.7 FIBROMYALGIA: ICD-10-CM

## 2018-09-14 DIAGNOSIS — E55.9 VITAMIN D DEFICIENCY: ICD-10-CM

## 2018-09-14 PROCEDURE — 90471 IMMUNIZATION ADMIN: CPT

## 2018-09-14 PROCEDURE — 3008F BODY MASS INDEX DOCD: CPT | Performed by: FAMILY MEDICINE

## 2018-09-14 PROCEDURE — 90682 RIV4 VACC RECOMBINANT DNA IM: CPT

## 2018-09-14 PROCEDURE — 99214 OFFICE O/P EST MOD 30 MIN: CPT | Performed by: FAMILY MEDICINE

## 2018-09-14 PROCEDURE — 3079F DIAST BP 80-89 MM HG: CPT | Performed by: FAMILY MEDICINE

## 2018-09-14 PROCEDURE — 1036F TOBACCO NON-USER: CPT | Performed by: FAMILY MEDICINE

## 2018-09-14 PROCEDURE — 3074F SYST BP LT 130 MM HG: CPT | Performed by: FAMILY MEDICINE

## 2018-09-14 RX ORDER — TRAZODONE HYDROCHLORIDE 50 MG/1
50 TABLET ORAL
Qty: 90 TABLET | Refills: 3 | Status: SHIPPED | OUTPATIENT
Start: 2018-09-14 | End: 2019-09-15 | Stop reason: SDUPTHER

## 2018-09-14 RX ORDER — GABAPENTIN 300 MG/1
300 CAPSULE ORAL 2 TIMES DAILY
Qty: 2270 CAPSULE | Refills: 3 | Status: SHIPPED | OUTPATIENT
Start: 2018-09-14 | End: 2019-01-18 | Stop reason: SDUPTHER

## 2018-09-14 NOTE — ASSESSMENT & PLAN NOTE
Continue with chronic podiatry follow-up    We are going to see if the gabapentin at the higher dose is helping her pain and she may try skipping Arthrotec intermittently with the eventual goal of getting off it altogether

## 2018-09-14 NOTE — PROGRESS NOTES
Assessment/Plan:    Type 2 diabetes mellitus without complication ([facility] )  Lab Results   Component Value Date    HGBA1C 6 8 (H) 09/11/2018     Mainor Morton gets seems to be working as A1c is trending down  Repeat A1c and follow-up in 4 months    Essential hypertension  Blood pressure is quite well controlled  Continue current medications  Primary localized osteoarthrosis of ankle and foot  Continue with chronic podiatry follow-up  We are going to see if the gabapentin at the higher dose is helping her pain and she may try skipping Arthrotec intermittently with the eventual goal of getting off it altogether    Abnormal liver enzymes  Resolved on recent labs  Continue to monitor  Major depressive disorder, recurrent episode, moderate (HCC)  Stable  Continue current regimen  Body mass index 45 0-49 9, adult ([facility] )  Continue to work on weight loss  Hopefully Mainor Morton will be help with this  Diagnoses and all orders for this visit:    Need for zoster vaccine  -     Zoster Vac Recomb Adjuvanted ([address]) 50 MCG SUSR; Inject 0 5 mL into a muscle once for 1 dose    Flu vaccine need  -     influenza vaccine, [id number], quadrivalent, recombinant, PF, 0 5 mL, for patients 18 yr+ (FLUBLOK)    Need for Tdap vaccination    Type 2 diabetes mellitus without complication, without long-term current use of insulin (HCC)  -     Comprehensive metabolic panel; Future  -     Hemoglobin A1C; Future  -     Microalbumin / creatinine urine ratio; Future    Essential hypertension    Pure hypercholesterolemia    Major depressive disorder, recurrent episode, moderate (HCC)    Vitamin D deficiency    Body mass index 45 0-49 9, adult (HCC)    Abnormal liver enzymes  -     Comprehensive metabolic panel; Future    Insomnia due to medical condition  -     traZODone (DESYREL) 50 mg tablet; Take 1 tablet (50 mg total) by mouth daily at bedtime as needed for sleep    Fibromyalgia  -     gabapentin (NEURONTIN) 300 mg capsule;  Take 1 capsule (300 mg total) by mouth 2 (two) times a day One capsule in the morning and 2 capsules at night    Other orders  -     ciprofloxacin (CIPRO) 250 mg tablet;   -     Cancel: tetanus-diphtheria-acellular pertussis (ADACEL) 5-2-15 5 LF-mcg/0 5 injection; Inject 0 5 mL into a muscle once for 1 dose          Subjective:      Patient ID: Tyrel Schwarz is a 61 y o  female  Diabetes   She has type 2 diabetes mellitus  No MedicAlert identification noted  The initial diagnosis of diabetes was made 15 years ago  Pertinent negatives for hypoglycemia include no confusion, dizziness, headaches, hunger, mood changes, nervousness/anxiousness, pallor, seizures, sleepiness, speech difficulty, sweats or tremors  Pertinent negatives for diabetes include no chest pain, no fatigue and no polyuria  Pertinent negatives for hypoglycemia complications include no blackouts, no hospitalization, no nocturnal hypoglycemia, no required assistance and no required glucagon injection  Symptoms are stable  Pertinent negatives for diabetic complications include no CVA, heart disease, impotence, nephropathy, peripheral neuropathy, PVD or retinopathy  Risk factors for coronary artery disease include family history, hypertension, obesity, sedentary lifestyle and stress  Current diabetic treatment includes oral agent (dual therapy)  She is compliant with treatment most of the time  Her weight is fluctuating minimally  She is following a generally unhealthy diet  Meal planning includes carbohydrate counting  She has not had a previous visit with a dietitian  She never participates in exercise  She monitors blood glucose at home 3-4 x per week  She monitors urine at home <1 x per month  Blood glucose monitoring compliance is adequate  Her home blood glucose trend is fluctuating minimally  Her breakfast blood glucose is taken between 9-10 am  Her breakfast blood glucose range is generally 140-180 mg/dl   Her lunch blood glucose is taken between 1-2 pm  Her lunch blood glucose range is generally 140-180 mg/dl  Her dinner blood glucose is taken between 6-7 pm  Her dinner blood glucose range is generally 140-180 mg/dl  Her highest blood glucose is 140-180 mg/dl  Her overall blood glucose range is 140-180 mg/dl  She sees a podiatrist Eye exam is current  The patient presents today for follow-up for diabetes  Fortunately, the A1c is well controlled  There have been no significant episodes of hypo or hyperglycemia  The patient is not experiencing any blurry vision, polyuria, polydipsia, or peripheral neuropathy symptoms  Patient remains compliant with medications and routine follow-up  A1c has improved to 6 4 with Rosezella Ohs  The patient presents today for a hypertension follow-up  Patient remains compliant with medications and denies any chest pain, shortness of breath, palpitations, lightheadedness or diaphoresis  She is morbidly obese and has had trouble losing weight  Unfortunately, even with Rosezella Ohs, she has not lost weight  She has been trying to watch her diet  She continues to follow closely with podiatry regarding her chronic right ankle issues  She is wearing a brace consistently  She has essential tremor which remains pretty stable  She is hoping that the elevated dose of gabapentin will allow her to stop Arthrotec  She has fibromyalgia which had flared recently but has improved slightly with gabapentin 300 mg in the morning and 600 mg at bedtime  She notes trazodone has helped her sleep significantly and she would like to continue this  She has a history of some depression which remained stable and she denies excessive depressed mood or anxiety at this time      The following portions of the patient's history were reviewed and updated as appropriate: allergies, current medications, past family history, past medical history, past social history, past surgical history and problem list     Review of Systems   Constitutional: Negative for appetite change, chills, fatigue, fever and unexpected weight change  HENT: Negative for trouble swallowing  Eyes: Negative for visual disturbance  Respiratory: Negative for cough, chest tightness, shortness of breath and wheezing  Cardiovascular: Negative for chest pain  Gastrointestinal: Negative for abdominal distention, abdominal pain, blood in stool, constipation and diarrhea  Endocrine: Negative for polyuria  Genitourinary: Negative for difficulty urinating, flank pain and impotence  Musculoskeletal: Negative for arthralgias and myalgias  Skin: Negative for pallor and rash  Neurological: Negative for dizziness, tremors, seizures, speech difficulty, light-headedness and headaches  Hematological: Negative for adenopathy  Does not bruise/bleed easily  Psychiatric/Behavioral: Negative for confusion and sleep disturbance  The patient is not nervous/anxious  Objective:      /86   Pulse 96   Temp 98 °F (36 7 °C)   Resp 18   Ht 5' 6" (1 676 m)   Wt 131 kg (288 lb 6 4 oz)   BMI 46 55 kg/m²          Physical Exam   Constitutional: She is oriented to person, place, and time  She appears well-developed and well-nourished  No distress  obese   HENT:   Head: Normocephalic  Eyes: Pupils are equal, round, and reactive to light  Neck: No tracheal deviation present  No thyromegaly present  Cardiovascular: Normal rate, regular rhythm and normal heart sounds  No murmur heard  Pulmonary/Chest: Effort normal  No respiratory distress  She has no wheezes  She has no rales  Abdominal: Soft  She exhibits no distension  There is no tenderness  Musculoskeletal: Normal range of motion  She exhibits tenderness (RLE chronically)  She exhibits no edema  Neurological: She is alert and oriented to person, place, and time  No cranial nerve deficit  Skin: Skin is warm  She is not diaphoretic  Psychiatric: She has a normal mood and affect   Judgment and thought content normal

## 2018-09-14 NOTE — ASSESSMENT & PLAN NOTE
Lab Results   Component Value Date    HGBA1C 6 8 (H) 09/11/2018     Daisy Joi carlos seems to be working as A1c is trending down    Repeat A1c and follow-up in 4 months

## 2018-10-16 ENCOUNTER — DOCUMENTATION (OUTPATIENT)
Dept: FAMILY MEDICINE CLINIC | Facility: CLINIC | Age: 63
End: 2018-10-16

## 2018-10-16 NOTE — PROGRESS NOTES
[facility]     Medication Approved : Metformin HCL ER   Auth/requisiton # [id number]   Dates: 8/12/2018 to 10/11/2019  Statues: Jimmye Merlin

## 2018-10-23 ENCOUNTER — TELEPHONE (OUTPATIENT)
Dept: FAMILY MEDICINE CLINIC | Facility: CLINIC | Age: 63
End: 2018-10-23

## 2018-10-25 DIAGNOSIS — E11.9 TYPE 2 DIABETES MELLITUS WITHOUT COMPLICATION, WITHOUT LONG-TERM CURRENT USE OF INSULIN (HCC): Primary | ICD-10-CM

## 2019-01-08 DIAGNOSIS — B37.9 CANDIDIASIS: Primary | ICD-10-CM

## 2019-01-10 RX ORDER — NYSTATIN 100000 [USP'U]/G
POWDER TOPICAL
Qty: 60 G | Refills: 0 | Status: SHIPPED | OUTPATIENT
Start: 2019-01-10 | End: 2020-01-10 | Stop reason: SDUPTHER

## 2019-01-11 ENCOUNTER — TRANSCRIBE ORDERS (OUTPATIENT)
Dept: ADMINISTRATIVE | Age: 64
End: 2019-01-11

## 2019-01-11 ENCOUNTER — APPOINTMENT (OUTPATIENT)
Dept: LAB | Age: 64
End: 2019-01-11
Payer: COMMERCIAL

## 2019-01-11 DIAGNOSIS — E11.9 TYPE 2 DIABETES MELLITUS WITHOUT COMPLICATION, WITHOUT LONG-TERM CURRENT USE OF INSULIN (HCC): ICD-10-CM

## 2019-01-11 DIAGNOSIS — R74.8 ABNORMAL LIVER ENZYMES: ICD-10-CM

## 2019-01-11 LAB
ALBUMIN SERPL BCP-MCNC: 3.8 G/DL (ref 3.5–5)
ALP SERPL-CCNC: 65 U/L (ref 46–116)
ALT SERPL W P-5'-P-CCNC: 29 U/L (ref 12–78)
ANION GAP SERPL CALCULATED.3IONS-SCNC: 5 MMOL/L (ref 4–13)
AST SERPL W P-5'-P-CCNC: 14 U/L (ref 5–45)
BILIRUB SERPL-MCNC: 0.41 MG/DL (ref 0.2–1)
BUN SERPL-MCNC: 14 MG/DL (ref 5–25)
CALCIUM SERPL-MCNC: 9.2 MG/DL (ref 8.3–10.1)
CHLORIDE SERPL-SCNC: 104 MMOL/L (ref 100–108)
CO2 SERPL-SCNC: 28 MMOL/L (ref 21–32)
CREAT SERPL-MCNC: 0.65 MG/DL (ref 0.6–1.3)
CREAT UR-MCNC: 68.6 MG/DL
EST. AVERAGE GLUCOSE BLD GHB EST-MCNC: 166 MG/DL
GFR SERPL CREATININE-BSD FRML MDRD: 95 ML/MIN/1.73SQ M
GLUCOSE P FAST SERPL-MCNC: 140 MG/DL (ref 65–99)
HBA1C MFR BLD: 7.4 % (ref 4.2–6.3)
MICROALBUMIN UR-MCNC: 31.1 MG/L (ref 0–20)
MICROALBUMIN/CREAT 24H UR: 45 MG/G CREATININE (ref 0–30)
POTASSIUM SERPL-SCNC: 4.2 MMOL/L (ref 3.5–5.3)
PROT SERPL-MCNC: 7.4 G/DL (ref 6.4–8.2)
SODIUM SERPL-SCNC: 137 MMOL/L (ref 136–145)

## 2019-01-11 PROCEDURE — 36415 COLL VENOUS BLD VENIPUNCTURE: CPT

## 2019-01-11 PROCEDURE — 82043 UR ALBUMIN QUANTITATIVE: CPT

## 2019-01-11 PROCEDURE — 82570 ASSAY OF URINE CREATININE: CPT

## 2019-01-11 PROCEDURE — 83036 HEMOGLOBIN GLYCOSYLATED A1C: CPT

## 2019-01-11 PROCEDURE — 3060F POS MICROALBUMINURIA REV: CPT | Performed by: FAMILY MEDICINE

## 2019-01-11 PROCEDURE — 80053 COMPREHEN METABOLIC PANEL: CPT

## 2019-01-14 ENCOUNTER — TELEPHONE (OUTPATIENT)
Dept: FAMILY MEDICINE CLINIC | Facility: CLINIC | Age: 64
End: 2019-01-14

## 2019-01-14 NOTE — TELEPHONE ENCOUNTER
----- Message from Rik Tavares MD sent at 1/11/2019  3:43 PM EST -----  Call patient regarding test   A1c trended up a bit to 7 4  She also has a little bit of protein starting to leak in the urine which we will follow closely  We can discuss routine follow-up

## 2019-01-18 ENCOUNTER — OFFICE VISIT (OUTPATIENT)
Dept: FAMILY MEDICINE CLINIC | Facility: CLINIC | Age: 64
End: 2019-01-18
Payer: COMMERCIAL

## 2019-01-18 VITALS
SYSTOLIC BLOOD PRESSURE: 136 MMHG | RESPIRATION RATE: 16 BRPM | HEART RATE: 76 BPM | TEMPERATURE: 99.2 F | OXYGEN SATURATION: 94 % | DIASTOLIC BLOOD PRESSURE: 78 MMHG | BODY MASS INDEX: 46.45 KG/M2 | WEIGHT: 289 LBS | HEIGHT: 66 IN

## 2019-01-18 DIAGNOSIS — F33.1 MAJOR DEPRESSIVE DISORDER, RECURRENT EPISODE, MODERATE (HCC): ICD-10-CM

## 2019-01-18 DIAGNOSIS — G47.01 INSOMNIA DUE TO MEDICAL CONDITION: ICD-10-CM

## 2019-01-18 DIAGNOSIS — E11.9 TYPE 2 DIABETES MELLITUS WITHOUT COMPLICATION, WITHOUT LONG-TERM CURRENT USE OF INSULIN (HCC): ICD-10-CM

## 2019-01-18 DIAGNOSIS — G25.0 ESSENTIAL TREMOR: ICD-10-CM

## 2019-01-18 DIAGNOSIS — R74.8 ABNORMAL LIVER ENZYMES: ICD-10-CM

## 2019-01-18 DIAGNOSIS — Z12.31 ENCOUNTER FOR SCREENING MAMMOGRAM FOR BREAST CANCER: ICD-10-CM

## 2019-01-18 DIAGNOSIS — Z00.00 MEDICARE ANNUAL WELLNESS VISIT, INITIAL: Primary | ICD-10-CM

## 2019-01-18 DIAGNOSIS — J06.9 VIRAL UPPER RESPIRATORY TRACT INFECTION: ICD-10-CM

## 2019-01-18 DIAGNOSIS — E66.01 MORBID OBESITY WITH BMI OF 45.0-49.9, ADULT (HCC): ICD-10-CM

## 2019-01-18 DIAGNOSIS — M79.7 FIBROMYALGIA: ICD-10-CM

## 2019-01-18 DIAGNOSIS — E55.9 VITAMIN D DEFICIENCY: ICD-10-CM

## 2019-01-18 DIAGNOSIS — I10 ESSENTIAL HYPERTENSION: ICD-10-CM

## 2019-01-18 DIAGNOSIS — E78.00 PURE HYPERCHOLESTEROLEMIA: ICD-10-CM

## 2019-01-18 PROCEDURE — G0439 PPPS, SUBSEQ VISIT: HCPCS | Performed by: FAMILY MEDICINE

## 2019-01-18 PROCEDURE — 3008F BODY MASS INDEX DOCD: CPT | Performed by: FAMILY MEDICINE

## 2019-01-18 PROCEDURE — 3075F SYST BP GE 130 - 139MM HG: CPT | Performed by: FAMILY MEDICINE

## 2019-01-18 PROCEDURE — 99214 OFFICE O/P EST MOD 30 MIN: CPT | Performed by: FAMILY MEDICINE

## 2019-01-18 PROCEDURE — 3078F DIAST BP <80 MM HG: CPT | Performed by: FAMILY MEDICINE

## 2019-01-18 RX ORDER — GABAPENTIN 300 MG/1
300 CAPSULE ORAL 3 TIMES DAILY
Qty: 360 CAPSULE | Refills: 3 | Status: SHIPPED | OUTPATIENT
Start: 2019-01-18 | End: 2020-01-06

## 2019-01-18 NOTE — PROGRESS NOTES
Assessment/Plan:       Problem List Items Addressed This Visit        Endocrine    Type 2 diabetes mellitus without complication ([facility] )     Lab Results   Component Value Date    HGBA1C 7 4 (H) 01/11/2019     A1c has trended in the wrong direction  Microalbumin has bumped just a bit as well  She is going to work on some weight loss and repeat A1c and CMP in 4 months         Relevant Orders    Comprehensive metabolic panel    Hemoglobin A1C       Cardiovascular and Mediastinum    Essential hypertension     Blood pressure is well controlled  Relevant Orders    Comprehensive metabolic panel       Nervous and Auditory    Essential tremor     Currently resolved continue to monitor         Relevant Medications    gabapentin (NEURONTIN) 300 mg capsule       Other    Abnormal liver enzymes     Normalized on recent labs         Relevant Orders    Comprehensive metabolic panel    Fibromyalgia    Relevant Medications    gabapentin (NEURONTIN) 300 mg capsule    Pure hypercholesterolemia     Currently well controlled  Major depressive disorder, recurrent episode, moderate (HCC)     Stable  She just takes trazodone at night  Morbid obesity with BMI of 45 0-49 9, adult ([facility] )     Continue work on weight loss  Vitamin D deficiency     Repeat vitamin-D with labs prior to follow-up  Relevant Orders    Vitamin D 25 hydroxy    Insomnia due to medical condition     Well controlled with trazodone           Other Visit Diagnoses     Medicare annual wellness visit, initial    -  Primary    Encounter for screening mammogram for breast cancer        Relevant Orders    Mammo screening bilateral w 3d & cad    Viral upper respiratory tract infection                Subjective:      Patient ID: David Briceño is a 61 y o  female  HPI patient presents today for follow-up for chronic health issues  She has history of type 2 diabetes  She is morbidly obese and has been having difficulty losing weight    She denies polyuria or polydipsia  She has chronic pain in her right ankle but no obvious neuropathic symptoms  She has history of hypertension and denies any problems chest pain, shortness of breath, palpitations or lightheadedness  She has hyperlipidemia and denies any problems with simvastatin in regards to myalgias or other side effects  She has history of insomnia as well as depression  She has been taking trazodone at night which has been helpful  She has some intermittent anxiety but denies persistent depressed mood at this time  Her essential tremor has improved significantly she is really not bothered by it at this time  The following portions of the patient's history were reviewed and updated as appropriate: allergies, current medications, past family history, past medical history, past social history, past surgical history and problem list     Review of Systems   Constitutional: Negative for appetite change, chills, fatigue, fever and unexpected weight change  HENT: Negative for trouble swallowing  Eyes: Negative for visual disturbance  Respiratory: Negative for cough, chest tightness, shortness of breath and wheezing  Cardiovascular: Negative for chest pain  Gastrointestinal: Negative for abdominal distention, abdominal pain, blood in stool, constipation, diarrhea, nausea and vomiting  Endocrine: Negative for polyuria  Genitourinary: Negative for difficulty urinating and flank pain  Musculoskeletal: Negative for arthralgias and myalgias  Skin: Negative for rash  Neurological: Negative for dizziness and light-headedness  Hematological: Negative for adenopathy  Does not bruise/bleed easily  Psychiatric/Behavioral: Negative for sleep disturbance           Objective:      /78   Pulse 76   Temp 99 2 °F (37 3 °C)   Resp 16   Ht 5' 6" (1 676 m)   Wt 131 kg (289 lb)   SpO2 94%   BMI 46 65 kg/m²          Physical Exam   Constitutional: She is oriented to person, place, and time  She appears well-developed and well-nourished  No distress  obese   HENT:   Head: Normocephalic  Eyes: Pupils are equal, round, and reactive to light  Right eye exhibits no discharge  Left eye exhibits no discharge  Neck: No tracheal deviation present  No thyromegaly present  Cardiovascular: Normal rate, regular rhythm and normal heart sounds  No murmur heard  Pulmonary/Chest: Effort normal  No respiratory distress  She has no wheezes  She has no rales  Abdominal: Soft  She exhibits no distension  There is no tenderness  Musculoskeletal: Normal range of motion  She exhibits no edema  Lymphadenopathy:     She has no cervical adenopathy  Neurological: She is alert and oriented to person, place, and time  No cranial nerve deficit  Skin: Skin is warm  She is not diaphoretic  No erythema  Psychiatric: She has a normal mood and affect   Judgment and thought content normal          Briseyda Ruth MD

## 2019-01-18 NOTE — PROGRESS NOTES
Assessment and Plan:    Problem List Items Addressed This Visit        Other    Fibromyalgia      Other Visit Diagnoses     Medicare annual wellness visit, initial    -  Primary    Encounter for screening mammogram for breast cancer            Health Maintenance Due   Topic Date Due    Medicare Annual Wellness Visit (AWV)  1955     Patient presents today for Medicare wellness visit  She does have some occasional anxiety mostly related to her chronic pain, but she does score a 1 on her PHQ-9 today  She is up-to-date on vaccines except for her tetanus shot in the new shingles vaccine  She may get these at the pharmacy  She is able to complete most of her activities of daily life, however she is significantly limited by her chronic leg pain due to her severe ankle issues  She is up-to-date on cancer screening as well as bone density testing  She is having no cognitive decline  HPI:  Janell Cortés is a 61 y o  female here for her Initial Wellness Visit      Patient Active Problem List   Diagnosis    Abnormal liver enzymes    Ankle pain, right    Type 2 diabetes mellitus without complication (HCC)    Essential tremor    Fibromyalgia    Gait disorder    Pure hypercholesterolemia    Essential hypertension    Irritable bowel syndrome    Lower back pain    Major depressive disorder, recurrent episode, moderate (HCC)    Morbid obesity with BMI of 45 0-49 9, adult (HCC)    Rosacea    Urinary incontinence    Vitamin D deficiency    Congenital valgus deformity of foot    Primary localized osteoarthrosis of ankle and foot    Osteoarthritis of hip    Osteoarthritis of knee    Tibialis tendinitis    History of total hip replacement    Insomnia due to medical condition     Past Medical History:   Diagnosis Date    Anemia     last assessed: 9/23/2015    Depression     Diabetes mellitus ([facility] )     Dysfunction of eustachian tube     unspecified laterality; last assessed: 7/12/2013    Endometriosis     Fibromyalgia     Hyperlipidemia     Hypertension     IBS (irritable bowel syndrome)     Morbid obesity ([address] )     Vitamin D deficiency      Past Surgical History:   Procedure Laterality Date    ANKLE SURGERY      , , -extensive surgery     SECTION      x2    JOINT REPLACEMENT      hip    LUMBAR EPIDURAL INJECTION  12/2005    x 1    DC COLONOSCOPY FLX DX W/COLLJ SPEC WHEN PFRMD N/A 8/3/2017    Procedure: COLONOSCOPY with polypectomy;  Surgeon: Kaykay Jeffries MD;  Location: [facility]; Service: Gastroenterology    TOTAL HIP ARTHROPLASTY Left 2013     Family History   Problem Relation Age of Onset    Other Mother         CABG    Other Father         gangrene     History   Smoking Status    Never Smoker   Smokeless Tobacco    Never Used     History   Alcohol Use    Yes     Comment: socail;  5 drinks weekly (as per [employer])      History   Drug Use No       Current Outpatient Prescriptions   Medication Sig Dispense Refill    Cholecalciferol (VITAMIN D) 2000 units tablet Take 2,000 Units by mouth daily      diclofenac-misoprostol (ARTHROTEC) 75-0 2 MG per tablet Take 1 tablet by mouth 2 (two) times a day      FARXIGA 5 MG TABS Take 1 tablet (5 mg total) by mouth every morning 90 tablet 3    fexofenadine (ALLEGRA) 180 MG tablet Take 180 mg by mouth daily      fluticasone (FLONASE) 50 mcg/act nasal spray 1 spray into each nostril daily      gabapentin (NEURONTIN) 300 mg capsule Take 1 capsule (300 mg total) by mouth 2 (two) times a day One capsule in the morning and 2 capsules at night (Patient taking differently: Take 300 mg by mouth One capsule in the morning and 2 capsules at night ) 2270 capsule 3    metFORMIN (GLUCOPHAGE) 1000 MG tablet Take 1 tablet (1,000 mg total) by mouth daily 90 tablet 3    nystatin (MYCOSTATIN) powder APPLY TO AFFECTED AREA TWICE A DAY AS DIRECTED   60 g 0    quinapril (ACCUPRIL) 40 MG tablet TAKE 1 TABLET DAILY 90 tablet 3    simvastatin (ZOCOR) 40 mg tablet TAKE 1 TABLET DAILY 90 tablet 3    traZODone (DESYREL) 50 mg tablet Take 1 tablet (50 mg total) by mouth daily at bedtime as needed for sleep 90 tablet 3     No current facility-administered medications for this visit  Allergies   Allergen Reactions    Augmentin [Amoxicillin-Pot Clavulanate] Nausea Only     Category: nausea,diarrhea;     Azithromycin Nausea Only    Doxycycline GI Intolerance    Erythromycin GI Intolerance    Lodine [Etodolac]      Category: rash;     Lorazepam      Annotation - [id number]: mental status changes    Sulfa Antibiotics      Category: rash;      Immunization History   Administered Date(s) Administered    Influenza 10/14/2009, 09/23/2015, 01/05/2017, 12/20/2017    Influenza Quadrivalent Preservative Free 3 years and older IM 12/23/2014, 01/05/2017    Influenza TIV (IM) 09/30/2011, 10/09/2012    Influenza, recombinant, quadrivalent,injectable, preservative free 09/14/2018    Pneumococcal Polysaccharide PPV23 12/15/2010    Td (adult), adsorbed 05/10/2002       Patient Care Team:  Sheri Alarcon MD as PCP - General  MD Ha Lindsey MD (Urogynecology)  Joe Pemberton DPM (Podiatry)    Medicare Screening Tests and Risk Assessments:      Health Risk Assessment:  Patient rates overall health as good  Patient feels that their physical health rating is Same  Eyesight was rated as Same  Hearing was rated as Slightly worse  Pain experienced by patient in the last 7 days has been Some  Patient's pain rating has been 8/10  Emotional/Mental Health:  Patient has been feeling nervous/anxious  PHQ-9 Depression Screening:    Frequency of the following problems over the past two weeks:      1  Little interest or pleasure in doing things: 0 - not at all      2  Feeling down, depressed, or hopeless: 1 - several days      3  Trouble falling or staying asleep, or sleeping too much: 0 - not at all      4   Feeling tired or having little energy: 0 - not at all      5  Poor appetite or overeatin - not at all      6  Feeling bad about yourself - or that you are a failure or have let yourself or your family down: 0 - not at all      7  Trouble concentrating on things, such as reading the newspaper or watching television: 0 - not at all      8  Moving or speaking so slowly that other people could have noticed  Or the opposite - being so fidgety or restless that you have been moving around a lot more than usual: 0 - not at all      9  Thoughts that you would be better off dead, or of hurting yourself in some way: 0 - not at all  PHQ-2 Score: 1  PHQ-9 Score: 1    Broken Bones/Falls: Fall Risk Assessment:    In the past year, patient has experienced: No history of falling in past year          Bladder/Bowel:  Patient has not leaked urine accidently in the last six months  Patient reports no loss of bowel control  Immunizations:  Patient has had a flu vaccination within the last year  Patient has received a pneumonia shot  Patient has not received a shingles shot  Patient has not received tetanus/diphtheria shot  Home Safety:  Patient has trouble with stairs inside or outside of their home  Patient currently reports that there are no safety hazards present in home, no working smoke alarms, no working carbon monoxide detectors  Preventative Screenings:   Breast cancer screening performed, colon cancer screen completed, cholesterol screen completed, glaucoma eye exam completed,     Nutrition:  Current diet: Diabetic and Low Carb with servings of the following:    Medications:  Patient is currently taking over-the-counter supplements  Patient is able to manage medications  Lifestyle Choices:  Patient reports no tobacco use  Patient has not smoked or used tobacco in the past   Patient reports alcohol use  Patient drives a vehicle  Patient wears seat belt          Activities of Daily Living:  Can get out of bed by his or her self, able to dress self, able to make own meals, able to do own shopping, able to bathe self, can do own laundry/housekeeping, can manage own money, pay bills and track expenses    Previous Hospitalizations:  No hospitalization or ED visit in past 12 months        Advanced Directives:  Patient has decided on a power of   Patient has spoken to designated power of   Patient has completed advanced directive  Preventative Screening/Counseling:      Cardiovascular:      General: Screening Not Indicated          Diabetes:      General: Screening Current          Colorectal Cancer:      General: Screening Current          Cervical Cancer:      General: Screening Not Indicated          Osteoporosis:      General: Screening Current      Comments: Normal bone density 2017        AAA:      General: Screening Not Indicated          Glaucoma:      General: Screening Current          HIV:      General: Screening Not Indicated          Hepatitis C:      General: Screening Current        Advanced Directives:   Patient has living will for healthcare, has durable POA for healthcare,         BMI Counseling: Body mass index is 46 65 kg/m²  Discussed the patient's BMI with her  The BMI is above average  BMI counseling and education was provided to the patient  Nutrition recommendations include reducing portion sizes, decreasing overall calorie intake and 3-5 servings of fruits/vegetables daily  Exercise recommendations include moderate aerobic physical activity for 150 minutes/week

## 2019-01-18 NOTE — PATIENT INSTRUCTIONS
Obesity   AMBULATORY CARE:   Obesity  is when your body mass index (BMI) is greater than 30  Your healthcare provider will use your height and weight to measure your BMI  The risks of obesity include  many health problems, such as injuries or physical disability  You may need tests to check for the following:  · Diabetes     · High blood pressure or high cholesterol     · Heart disease     · Gallbladder or liver disease     · Cancer of the colon, breast, prostate, liver, or kidney     · Sleep apnea     · Arthritis or gout  Seek care immediately if:   · You have a severe headache, confusion, or difficulty speaking  · You have weakness on one side of your body  · You have chest pain, sweating, or shortness of breath  Contact your healthcare provider if:   · You have symptoms of gallbladder or liver disease, such as pain in your upper abdomen  · You have knee or hip pain and discomfort while walking  · You have symptoms of diabetes, such as intense hunger and thirst, and frequent urination  · You have symptoms of sleep apnea, such as snoring or daytime sleepiness  · You have questions or concerns about your condition or care  Treatment for obesity  focuses on helping you lose weight to improve your health  Even a small decrease in BMI can reduce the risk for many health problems  Your healthcare provider will help you set a weight-loss goal   · Lifestyle changes  are the first step in treating obesity  These include making healthy food choices and getting regular physical activity  Your healthcare provider may suggest a weight-loss program that involves coaching, education, and therapy  · Medicine  may help you lose weight when it is used with a healthy diet and physical activity  · Surgery  can help you lose weight if you are very obese and have other health problems  There are several types of weight-loss surgery  Ask your healthcare provider for more information    Be successful losing weight:   · Set small, realistic goals  An example of a small goal is to walk for 20 minutes 5 days a week  Anther goal is to lose 5% of your body weight  · Tell friends, family members, and coworkers about your goals  and ask for their support  Ask a friend to lose weight with you, or join a weight-loss support group  · Identify foods or triggers that may cause you to overeat , and find ways to avoid them  Remove tempting high-calorie foods from your home and workplace  Place a bowl of fresh fruit on your kitchen counter  If stress causes you to eat, then find other ways to cope with stress  · Keep a diary to track what you eat and drink  Also write down how many minutes of physical activity you do each day  Weigh yourself once a week and record it in your diary  Eating changes: You will need to eat 500 to 1,000 fewer calories each day than you currently eat to lose 1 to 2 pounds a week  The following changes will help you cut calories:  · Eat smaller portions  Use small plates, no larger than 9 inches in diameter  Fill your plate half full of fruits and vegetables  Measure your food using measuring cups until you know what a serving size looks like  · Eat 3 meals and 1 or 2 snacks each day  Plan your meals in advance  Mansi Laughter and eat at home most of the time  Eat slowly  · Eat fruits and vegetables at every meal   They are low in calories and high in fiber, which makes you feel full  Do not add butter, margarine, or cream sauce to vegetables  Use herbs to season steamed vegetables  · Eat less fat and fewer fried foods  Eat more baked or grilled chicken and fish  These protein sources are lower in calories and fat than red meat  Limit fast food  Dress your salads with olive oil and vinegar instead of bottled dressing  · Limit the amount of sugar you eat  Do not drink sugary beverages  Limit alcohol  Activity changes:  Physical activity is good for your body in many ways   It helps you burn calories and build strong muscles  It decreases stress and depression, and improves your mood  It can also help you sleep better  Talk to your healthcare provider before you begin an exercise program   · Exercise for at least 30 minutes 5 days a week  Start slowly  Set aside time each day for physical activity that you enjoy and that is convenient for you  It is best to do both weight training and an activity that increases your heart rate, such as walking, bicycling, or swimming  · Find ways to be more active  Do yard work and housecleaning  Walk up the stairs instead of using elevators  Spend your leisure time going to events that require walking, such as outdoor festivals or fairs  This extra physical activity can help you lose weight and keep it off  Follow up with your healthcare provider as directed: You may need to meet with a dietitian  Write down your questions so you remember to ask them during your visits  © 2017 [address] [employer] [employer] Information is for End User's use only and may not be sold, redistributed or otherwise used for commercial purposes  All illustrations and images included in CareNotes® are the copyrighted property of A [employer] M [employer]  or Hesham Jamil  The above information is an  only  It is not intended as medical advice for individual conditions or treatments  Talk to your doctor, nurse or pharmacist before following any medical regimen to see if it is safe and effective for you

## 2019-01-18 NOTE — ASSESSMENT & PLAN NOTE
Lab Results   Component Value Date    HGBA1C 7 4 (H) 01/11/2019     A1c has trended in the wrong direction  Microalbumin has bumped just a bit as well    She is going to work on some weight loss and repeat A1c and CMP in 4 months

## 2019-03-28 ENCOUNTER — OFFICE VISIT (OUTPATIENT)
Dept: FAMILY MEDICINE CLINIC | Facility: CLINIC | Age: 64
End: 2019-03-28
Payer: COMMERCIAL

## 2019-03-28 VITALS
SYSTOLIC BLOOD PRESSURE: 142 MMHG | HEIGHT: 66 IN | WEIGHT: 286.5 LBS | RESPIRATION RATE: 22 BRPM | BODY MASS INDEX: 46.04 KG/M2 | HEART RATE: 88 BPM | DIASTOLIC BLOOD PRESSURE: 80 MMHG | OXYGEN SATURATION: 97 % | TEMPERATURE: 98 F

## 2019-03-28 DIAGNOSIS — M19.079 LOCALIZED, PRIMARY OSTEOARTHRITIS OF ANKLE OR FOOT, UNSPECIFIED LATERALITY: ICD-10-CM

## 2019-03-28 DIAGNOSIS — Q66.6 CONGENITAL VALGUS DEFORMITY OF FOOT: ICD-10-CM

## 2019-03-28 DIAGNOSIS — E11.9 TYPE 2 DIABETES MELLITUS WITHOUT COMPLICATION, WITHOUT LONG-TERM CURRENT USE OF INSULIN (HCC): Primary | ICD-10-CM

## 2019-03-28 PROCEDURE — 99213 OFFICE O/P EST LOW 20 MIN: CPT | Performed by: INTERNAL MEDICINE

## 2019-03-28 PROCEDURE — 1036F TOBACCO NON-USER: CPT | Performed by: INTERNAL MEDICINE

## 2019-03-28 PROCEDURE — 3008F BODY MASS INDEX DOCD: CPT | Performed by: INTERNAL MEDICINE

## 2019-03-28 NOTE — PROGRESS NOTES
Assessment/Plan:     Diagnoses and all orders for this visit:    Type 2 diabetes mellitus without complication, without long-term current use of insulin (HCC)    Congenital valgus deformity of foot    Localized, primary osteoarthritis of ankle or foot, unspecified laterality      Ancelmo Harris was seen and examined in the office today  With her known ankle deformities, I did complete the paperwork which will be faxed to [address]  She is aware that he A1c did rise last time and is working on her diet currently  Otherwise no other changes were made  Subjective:      Patient ID: Madeleine Barillas is a 61 y o  female  Ancelmo Harris is a pleasant woman that is here today for a request to get diabetic custom shoes  She has known congenital deformity involving her ankle (b/l) where she has had reconstructive surgery involving the ankle/foot  She follows actively with podiatry as well  Currently, her right foot is a brace to help offload pressure  She does not have much pain fortunately  Other chart was reviewed and updated  The following portions of the patient's history were reviewed and updated as appropriate: allergies, current medications, past family history, past medical history, past social history, past surgical history and problem list     Review of Systems   Constitutional: Negative for chills, fever and unexpected weight change  Respiratory: Negative for cough and chest tightness  Cardiovascular: Negative for chest pain  Gastrointestinal: Negative for abdominal pain, diarrhea, nausea and vomiting  Musculoskeletal: Positive for gait problem  Negative for arthralgias and myalgias  Neurological: Negative for dizziness, numbness and headaches  Objective:      /80   Pulse 88   Temp 98 °F (36 7 °C)   Resp 22   Ht 5' 6" (1 676 m)   Wt 130 kg (286 lb 8 oz)   SpO2 97%   BMI 46 24 kg/m²          Physical Exam   Constitutional: She is oriented to person, place, and time   She appears well-developed and well-nourished  No distress  HENT:   Head: Normocephalic and atraumatic  Eyes: Conjunctivae and EOM are normal  Right eye exhibits no discharge  Left eye exhibits no discharge  No scleral icterus  Neck: Normal range of motion  Cardiovascular: Normal rate and regular rhythm  No murmur heard  At times sounds irregular   Pulmonary/Chest: Effort normal and breath sounds normal  No respiratory distress  She has no wheezes  Musculoskeletal:   RLE in a brace, uses cane to ambulate   Lymphadenopathy:     She has no cervical adenopathy  Neurological: She is alert and oriented to person, place, and time  A sensory deficit is present  Skin: Skin is warm and dry  She is not diaphoretic  No erythema  Psychiatric: She has a normal mood and affect  Her speech is normal and behavior is normal  Judgment and thought content normal    Vitals reviewed

## 2019-04-02 DIAGNOSIS — E11.9 TYPE 2 DIABETES MELLITUS WITHOUT COMPLICATION, WITHOUT LONG-TERM CURRENT USE OF INSULIN (HCC): ICD-10-CM

## 2019-04-03 RX ORDER — DAPAGLIFLOZIN 5 MG/1
TABLET, FILM COATED ORAL
Qty: 90 TABLET | Refills: 3 | Status: SHIPPED | OUTPATIENT
Start: 2019-04-03 | End: 2020-03-02

## 2019-04-03 RX ORDER — METFORMIN HYDROCHLORIDE 1000 MG/1
TABLET, FILM COATED, EXTENDED RELEASE ORAL
Qty: 90 TABLET | Refills: 3 | Status: SHIPPED | OUTPATIENT
Start: 2019-04-03 | End: 2019-10-24 | Stop reason: CLARIF

## 2019-05-21 ENCOUNTER — HOSPITAL ENCOUNTER (OUTPATIENT)
Dept: RADIOLOGY | Age: 64
Discharge: HOME | End: 2019-05-21
Payer: COMMERCIAL

## 2019-05-21 VITALS — BODY MASS INDEX: 45.96 KG/M2 | HEIGHT: 66 IN | WEIGHT: 286 LBS

## 2019-05-21 DIAGNOSIS — Z12.31 ENCOUNTER FOR SCREENING MAMMOGRAM FOR BREAST CANCER: ICD-10-CM

## 2019-05-21 PROCEDURE — 77067 SCR MAMMO BI INCL CAD: CPT

## 2019-05-21 PROCEDURE — 77063 BREAST TOMOSYNTHESIS BI: CPT

## 2019-06-07 DIAGNOSIS — Q66.6 CONGENITAL VALGUS DEFORMITY OF FOOT: Primary | ICD-10-CM

## 2019-06-07 RX ORDER — CYCLOBENZAPRINE HCL 10 MG
10 TABLET ORAL 3 TIMES DAILY PRN
Qty: 30 TABLET | Refills: 0 | Status: SHIPPED | OUTPATIENT
Start: 2019-06-07 | End: 2020-01-10

## 2019-06-21 ENCOUNTER — OFFICE VISIT (OUTPATIENT)
Dept: FAMILY MEDICINE CLINIC | Facility: CLINIC | Age: 64
End: 2019-06-21
Payer: COMMERCIAL

## 2019-06-21 VITALS
SYSTOLIC BLOOD PRESSURE: 102 MMHG | DIASTOLIC BLOOD PRESSURE: 72 MMHG | HEIGHT: 66 IN | BODY MASS INDEX: 43.39 KG/M2 | OXYGEN SATURATION: 98 % | TEMPERATURE: 98.8 F | HEART RATE: 92 BPM | WEIGHT: 270 LBS

## 2019-06-21 DIAGNOSIS — J01.90 ACUTE SINUSITIS, RECURRENCE NOT SPECIFIED, UNSPECIFIED LOCATION: Primary | ICD-10-CM

## 2019-06-21 PROCEDURE — 99213 OFFICE O/P EST LOW 20 MIN: CPT | Performed by: PHYSICIAN ASSISTANT

## 2019-06-21 PROCEDURE — 3008F BODY MASS INDEX DOCD: CPT | Performed by: PHYSICIAN ASSISTANT

## 2019-06-21 PROCEDURE — 1036F TOBACCO NON-USER: CPT | Performed by: PHYSICIAN ASSISTANT

## 2019-06-21 RX ORDER — LEVOFLOXACIN 500 MG/1
500 TABLET, FILM COATED ORAL EVERY 24 HOURS
Qty: 10 TABLET | Refills: 0 | Status: SHIPPED | OUTPATIENT
Start: 2019-06-21 | End: 2019-07-01

## 2019-06-24 ENCOUNTER — TELEPHONE (OUTPATIENT)
Dept: FAMILY MEDICINE CLINIC | Facility: CLINIC | Age: 64
End: 2019-06-24

## 2019-06-24 DIAGNOSIS — J32.9 SINUSITIS, UNSPECIFIED CHRONICITY, UNSPECIFIED LOCATION: Primary | ICD-10-CM

## 2019-06-24 RX ORDER — AMOXICILLIN 500 MG/1
1000 TABLET, FILM COATED ORAL 3 TIMES DAILY
Qty: 60 TABLET | Refills: 0 | Status: SHIPPED | OUTPATIENT
Start: 2019-06-24 | End: 2019-07-04

## 2019-06-27 DIAGNOSIS — I10 ESSENTIAL HYPERTENSION: ICD-10-CM

## 2019-06-27 DIAGNOSIS — E78.00 HYPERCHOLESTEREMIA: ICD-10-CM

## 2019-06-28 PROCEDURE — 4010F ACE/ARB THERAPY RXD/TAKEN: CPT | Performed by: PHYSICIAN ASSISTANT

## 2019-06-28 RX ORDER — QUINAPRIL 40 MG/1
TABLET ORAL
Qty: 90 TABLET | Refills: 3 | Status: SHIPPED | OUTPATIENT
Start: 2019-06-28 | End: 2020-05-27

## 2019-06-28 RX ORDER — SIMVASTATIN 40 MG
TABLET ORAL
Qty: 90 TABLET | Refills: 3 | Status: SHIPPED | OUTPATIENT
Start: 2019-06-28 | End: 2020-05-27

## 2019-07-08 ENCOUNTER — TRANSCRIBE ORDERS (OUTPATIENT)
Dept: ADMINISTRATIVE | Facility: HOSPITAL | Age: 64
End: 2019-07-08

## 2019-07-08 ENCOUNTER — APPOINTMENT (OUTPATIENT)
Dept: LAB | Facility: IMAGING CENTER | Age: 64
End: 2019-07-08
Payer: COMMERCIAL

## 2019-07-08 DIAGNOSIS — R74.8 ABNORMAL LIVER ENZYMES: ICD-10-CM

## 2019-07-08 DIAGNOSIS — E55.9 VITAMIN D DEFICIENCY: ICD-10-CM

## 2019-07-08 DIAGNOSIS — I10 ESSENTIAL HYPERTENSION: ICD-10-CM

## 2019-07-08 DIAGNOSIS — E11.9 TYPE 2 DIABETES MELLITUS WITHOUT COMPLICATION, WITHOUT LONG-TERM CURRENT USE OF INSULIN (HCC): ICD-10-CM

## 2019-07-08 LAB
25(OH)D3 SERPL-MCNC: 53.9 NG/ML (ref 30–100)
ALBUMIN SERPL BCP-MCNC: 3.9 G/DL (ref 3.5–5)
ALP SERPL-CCNC: 65 U/L (ref 46–116)
ALT SERPL W P-5'-P-CCNC: 31 U/L (ref 12–78)
ANION GAP SERPL CALCULATED.3IONS-SCNC: 6 MMOL/L (ref 4–13)
AST SERPL W P-5'-P-CCNC: 26 U/L (ref 5–45)
BILIRUB SERPL-MCNC: 0.51 MG/DL (ref 0.2–1)
BUN SERPL-MCNC: 9 MG/DL (ref 5–25)
CALCIUM SERPL-MCNC: 9.5 MG/DL (ref 8.3–10.1)
CHLORIDE SERPL-SCNC: 105 MMOL/L (ref 100–108)
CO2 SERPL-SCNC: 29 MMOL/L (ref 21–32)
CREAT SERPL-MCNC: 0.75 MG/DL (ref 0.6–1.3)
EST. AVERAGE GLUCOSE BLD GHB EST-MCNC: 143 MG/DL
GFR SERPL CREATININE-BSD FRML MDRD: 85 ML/MIN/1.73SQ M
GLUCOSE P FAST SERPL-MCNC: 138 MG/DL (ref 65–99)
HBA1C MFR BLD: 6.6 % (ref 4.2–6.3)
POTASSIUM SERPL-SCNC: 4.4 MMOL/L (ref 3.5–5.3)
PROT SERPL-MCNC: 7.4 G/DL (ref 6.4–8.2)
SODIUM SERPL-SCNC: 140 MMOL/L (ref 136–145)

## 2019-07-08 PROCEDURE — 83036 HEMOGLOBIN GLYCOSYLATED A1C: CPT

## 2019-07-08 PROCEDURE — 36415 COLL VENOUS BLD VENIPUNCTURE: CPT

## 2019-07-08 PROCEDURE — 80053 COMPREHEN METABOLIC PANEL: CPT

## 2019-07-08 PROCEDURE — 82306 VITAMIN D 25 HYDROXY: CPT

## 2019-07-12 ENCOUNTER — OFFICE VISIT (OUTPATIENT)
Dept: FAMILY MEDICINE CLINIC | Facility: CLINIC | Age: 64
End: 2019-07-12
Payer: COMMERCIAL

## 2019-07-12 VITALS
HEART RATE: 92 BPM | WEIGHT: 270 LBS | HEIGHT: 66 IN | SYSTOLIC BLOOD PRESSURE: 122 MMHG | RESPIRATION RATE: 18 BRPM | BODY MASS INDEX: 43.39 KG/M2 | OXYGEN SATURATION: 95 % | DIASTOLIC BLOOD PRESSURE: 78 MMHG

## 2019-07-12 DIAGNOSIS — S76.311A STRAIN OF RIGHT HAMSTRING MUSCLE, INITIAL ENCOUNTER: ICD-10-CM

## 2019-07-12 DIAGNOSIS — E55.9 VITAMIN D DEFICIENCY: ICD-10-CM

## 2019-07-12 DIAGNOSIS — E66.01 OBESITY, CLASS III, BMI 40-49.9 (MORBID OBESITY) (HCC): ICD-10-CM

## 2019-07-12 DIAGNOSIS — I10 ESSENTIAL HYPERTENSION: ICD-10-CM

## 2019-07-12 DIAGNOSIS — E78.00 PURE HYPERCHOLESTEROLEMIA: ICD-10-CM

## 2019-07-12 DIAGNOSIS — R74.8 ABNORMAL LIVER ENZYMES: ICD-10-CM

## 2019-07-12 DIAGNOSIS — E66.01 CLASS 3 SEVERE OBESITY DUE TO EXCESS CALORIES WITH SERIOUS COMORBIDITY AND BODY MASS INDEX (BMI) OF 40.0 TO 44.9 IN ADULT (HCC): ICD-10-CM

## 2019-07-12 DIAGNOSIS — R26.89 MULTIFACTORIAL GAIT DISORDER: ICD-10-CM

## 2019-07-12 DIAGNOSIS — E11.9 TYPE 2 DIABETES MELLITUS WITHOUT COMPLICATION, WITHOUT LONG-TERM CURRENT USE OF INSULIN (HCC): Primary | ICD-10-CM

## 2019-07-12 DIAGNOSIS — F33.1 MAJOR DEPRESSIVE DISORDER, RECURRENT EPISODE, MODERATE (HCC): ICD-10-CM

## 2019-07-12 DIAGNOSIS — M19.079 LOCALIZED, PRIMARY OSTEOARTHRITIS OF ANKLE OR FOOT, UNSPECIFIED LATERALITY: ICD-10-CM

## 2019-07-12 DIAGNOSIS — J32.0 CHRONIC MAXILLARY SINUSITIS: ICD-10-CM

## 2019-07-12 PROBLEM — E66.813 CLASS 3 SEVERE OBESITY DUE TO EXCESS CALORIES WITH SERIOUS COMORBIDITY AND BODY MASS INDEX (BMI) OF 40.0 TO 44.9 IN ADULT (HCC): Status: ACTIVE | Noted: 2017-01-30

## 2019-07-12 PROCEDURE — 99214 OFFICE O/P EST MOD 30 MIN: CPT | Performed by: FAMILY MEDICINE

## 2019-07-12 PROCEDURE — 3008F BODY MASS INDEX DOCD: CPT | Performed by: FAMILY MEDICINE

## 2019-07-12 PROCEDURE — 3074F SYST BP LT 130 MM HG: CPT | Performed by: FAMILY MEDICINE

## 2019-07-12 RX ORDER — NAPROXEN SODIUM 220 MG
TABLET ORAL
Start: 2019-07-12

## 2019-07-12 RX ORDER — METHYLPREDNISOLONE 4 MG/1
TABLET ORAL
Qty: 21 TABLET | Refills: 0 | Status: SHIPPED | OUTPATIENT
Start: 2019-07-12 | End: 2020-01-10

## 2019-07-12 NOTE — PROGRESS NOTES
Assessment/Plan:       Problem List Items Addressed This Visit        Endocrine    Type 2 diabetes mellitus without complication ([facility] ) - Primary    Relevant Orders    Microalbumin / creatinine urine ratio    Hemoglobin A1C       Cardiovascular and Mediastinum    Essential hypertension    Relevant Orders    Comprehensive metabolic panel    Hemoglobin A1C       Musculoskeletal and Integument    Primary localized osteoarthrosis of ankle and foot    Relevant Medications    naproxen sodium (ALEVE) 220 MG tablet       Other    Abnormal liver enzymes     Likely due to fatty liver  Continue to monitor CMP  Pure hypercholesterolemia    Major depressive disorder, recurrent episode, moderate ([facility] )     This is currently stable  Continue with her current regimen  Class 3 severe obesity due to excess calories with serious comorbidity and body mass index (BMI) of 40 0 to 44 9 in [facility])     She continues to lose weight  Continue to work on weight loss  Vitamin D deficiency      Other Visit Diagnoses     Obesity, Class III, BMI 40-49 9 (morbid obesity) ([facility] )        Chronic maxillary sinusitis        Trial of a Medrol Dosepak  Call if she is not improving  Relevant Medications    methylPREDNISolone 4 MG tablet therapy pack    Strain of right hamstring muscle, initial encounter        Relevant Orders    Ambulatory referral to Physical Therapy    Multifactorial gait disorder        Relevant Orders    Ambulatory referral to Physical Therapy          BMI Counseling: Body mass index is 43 58 kg/m²  Discussed the patient's BMI with her  The BMI is above average  BMI counseling and education was provided to the patient  Nutrition recommendations include reducing portion sizes, decreasing overall calorie intake and 3-5 servings of fruits/vegetables daily  Exercise recommendations include moderate aerobic physical activity for 150 minutes/week  Subjective:      Patient ID: Sandra Metzger is a 59 y o  female  HPI  Patient presents today for follow-up for chronic health issues  She is having a lot of pain in her right posterior leg  She did get some new orthotics for both ankles back in May and notes having some significant leg pain as she adjusted to this  Unfortunately, the pain in her upper right leg has not resolved  She has occasional significant cramping with mild discomfort chronically  Usually this improves with positional changes  The patient presents today for follow-up for diabetes  Fortunately, the A1c is well controlled  There have been no significant episodes of hypo or hyperglycemia  The patient is not experiencing any blurry vision, polyuria, polydipsia, or peripheral neuropathy symptoms  Patient remains compliant with medications and routine follow-up  She has been able to lose weight fortunately  She is morbidly obese but has been working on weight loss and has lost 50 lb over the past several years  She continues to work routinely on weight loss and seems to be improving since she is on [place]  She was treated with Levaquin and then switched to Augmentin due to some concerns about tendinitis just a month ago  She unfortunately continues to have a congestion in bilateral cheeks and has now developed a cough with some mild intermittent wheezing  She denies fever or chills  She is not having excessive shortness of breath  She has history of depression but notes mood has been quite stable  She does remain on trazodone  She denies any homicidal or suicidal ideation  The patient presents today for a hypertension follow-up  Patient remains compliant with medications and denies any chest pain, shortness of breath, palpitations, lightheadedness or diaphoresis      The following portions of the patient's history were reviewed and updated as appropriate: allergies, current medications, past family history, past medical history, past social history, past surgical history and problem list       Current Outpatient Medications:     Cholecalciferol (VITAMIN D) 2000 units tablet, Take 2,000 Units by mouth daily, Disp: , Rfl:     FARXIGA 5 MG TABS, TAKE 1 TABLET EVERY MORNING, Disp: 90 tablet, Rfl: 3    fexofenadine (ALLEGRA) 180 MG tablet, Take 180 mg by mouth daily, Disp: , Rfl:     fluticasone (FLONASE) 50 mcg/act nasal spray, 1 spray into each nostril daily, Disp: , Rfl:     gabapentin (NEURONTIN) 300 mg capsule, Take 1 capsule (300 mg total) by mouth 3 (three) times a day Take 1 capsule in the morning, 1 capsule mid day and 2 at night , Disp: 360 capsule, Rfl: 3    metFORMIN (GLUMETZA) 1000 MG (MOD) 24 hr tablet, TAKE 1 TABLET DAILY WITH BREAKFAST, Disp: 90 tablet, Rfl: 3    nystatin (MYCOSTATIN) powder, APPLY TO AFFECTED AREA TWICE A DAY AS DIRECTED , Disp: 60 g, Rfl: 0    quinapril (ACCUPRIL) 40 MG tablet, TAKE 1 TABLET DAILY, Disp: 90 tablet, Rfl: 3    simvastatin (ZOCOR) 40 mg tablet, TAKE 1 TABLET DAILY, Disp: 90 tablet, Rfl: 3    traZODone (DESYREL) 50 mg tablet, Take 1 tablet (50 mg total) by mouth daily at bedtime as needed for sleep, Disp: 90 tablet, Rfl: 3    cyclobenzaprine (FLEXERIL) 10 mg tablet, Take 1 tablet (10 mg total) by mouth 3 (three) times a day as needed for muscle spasms (Patient not taking: Reported on 7/12/2019), Disp: 30 tablet, Rfl: 0    methylPREDNISolone 4 MG tablet therapy pack, Use as directed on package, Disp: 21 tablet, Rfl: 0    naproxen sodium (ALEVE) 220 MG tablet, Take 2 tabs every morning  Take 2 tabs in the evening as needed  , Disp: , Rfl:      Review of Systems   Constitutional: Negative for appetite change, chills, fatigue, fever and unexpected weight change  HENT: Negative for trouble swallowing  Eyes: Negative for visual disturbance  Respiratory: Negative for cough, chest tightness, shortness of breath and wheezing  Cardiovascular: Negative for chest pain     Gastrointestinal: Negative for abdominal distention, abdominal pain, blood in stool, constipation and diarrhea  Endocrine: Negative for polyuria  Genitourinary: Negative for difficulty urinating and flank pain  Musculoskeletal: Negative for arthralgias and myalgias  Skin: Negative for rash  Neurological: Negative for dizziness and light-headedness  Hematological: Negative for adenopathy  Does not bruise/bleed easily  Psychiatric/Behavioral: Negative for sleep disturbance  Objective:      /78   Pulse 92   Resp 18   Ht 5' 6" (1 676 m)   Wt 122 kg (270 lb)   SpO2 95%   BMI 43 58 kg/m²          Physical Exam   Constitutional: She is oriented to person, place, and time  She appears well-developed and well-nourished  No distress  obese   HENT:   Head: Normocephalic  Eyes: Pupils are equal, round, and reactive to light  Right eye exhibits no discharge  Left eye exhibits no discharge  Neck: No tracheal deviation present  No thyromegaly present  Cardiovascular: Normal rate, regular rhythm and normal heart sounds  No murmur heard  Pulmonary/Chest: Effort normal  No respiratory distress  She has wheezes ( she has some slight diffuse wheezing )  She has no rales  Abdominal: Soft  She exhibits no distension  There is no tenderness  Musculoskeletal: Normal range of motion  She exhibits tenderness (She has tenderness to palpation along the right hamstring muscle pretty much diffusely  She does chronically wear a brace on her right ankle and wears orthotic shoes due to her severe ankle and foot arthritis  )  She exhibits no edema  Lymphadenopathy:     She has no cervical adenopathy  Neurological: She is alert and oriented to person, place, and time  No cranial nerve deficit  Skin: Skin is warm  She is not diaphoretic  No erythema  Psychiatric: She has a normal mood and affect   Judgment and thought content normal          Diego Ellington MD

## 2019-07-18 ENCOUNTER — EVALUATION (OUTPATIENT)
Dept: PHYSICAL THERAPY | Age: 64
End: 2019-07-18
Payer: COMMERCIAL

## 2019-07-18 DIAGNOSIS — S76.311A STRAIN OF RIGHT HAMSTRING MUSCLE, INITIAL ENCOUNTER: ICD-10-CM

## 2019-07-18 DIAGNOSIS — R26.89 MULTIFACTORIAL GAIT DISORDER: Primary | ICD-10-CM

## 2019-07-18 PROCEDURE — 97162 PT EVAL MOD COMPLEX 30 MIN: CPT | Performed by: PHYSICAL THERAPIST

## 2019-07-18 PROCEDURE — 97110 THERAPEUTIC EXERCISES: CPT | Performed by: PHYSICAL THERAPIST

## 2019-07-18 NOTE — PROGRESS NOTES
PT Evaluation     Today's date: 2019  Patient name: Susanne Meade  : 1955  MRN: [id number]  Referring provider: Ruba Farah , *  Dx:   Encounter Diagnosis     ICD-10-CM    1  Multifactorial gait disorder R26 89 Ambulatory referral to Physical Therapy   2  Strain of right hamstring muscle, initial encounter [phone] Ambulatory referral to Physical Therapy                  Assessment  Assessment details: Kusum Paulino reports to PT with CC of poor mobility and pain in R hamstrings  Results of eval indicate LE weakness, tenderness to R Semitendinosus / Semimembranosus tendons, and impaired dynamic balance  Pt is at risk of falls based on DGI  Testing supports dx of strain to hamstrings, testing was negative for lumbar radiculopathy or neural tension  These impairments are resulting in difficulty with community ambulation, negotiating stair and curbs, and tolerating standing  Would benefit from skilled PT to address these deficits  Barriers to therapy: B/l ankle reconstruction   Understanding of Dx/Px/POC: good   Prognosis: good    Goals  STGs (4 weeks)  Pt will be independent with comprehensive HEP   Pt will report reduced pain at worst by at least 3 points       LTG's (to be achieved by d/c)  Pt will be able to self manage sx's independently   Pt will demonstrate at least 3 point improvement on DGI  Pt will be able to safely negotiate curbs with least restrictive AD  Pt will report at least 50% reduction with difficulty walking in store due to low back fatigue     Pt will improve on 5xSTS test by at least 5 seconds     Plan  Plan details: Initiate POC  Patient would benefit from: skilled physical therapy  Planned therapy interventions: manual therapy, balance, home exercise program, therapeutic activities, therapeutic exercise, stretching, strengthening, patient education and neuromuscular re-education  Frequency: 2x week  Duration in weeks: 8  Plan of Care beginning date: 2019  Plan of Care expiration date: 10/31/2019  Treatment plan discussed with: patient        Subjective Evaluation    History of Present Illness  Mechanism of injury: Pt presents to PT with CC of pain in posterior R thigh and overall difficulty with walking  States that 6 months ago had pain in R post thigh while getting into her car  States that she notices the pain when sitting down to fast and still when getting in/out of car, doing stairs, and pressing down on gas peddle  Has b/l ankle reconstructions due to pes planus with fusion, most recent in   The fusion failed on R side which resulted in needing to use a hinged ankle foot orthosis at all times  States that this failure on R ankle really led to a decline in her mobility  Denies ankle pain  Uses SPC to help with her balance at most times  Feels more unsteady since getting special orthopedic shoes  Lives in 1 story home with 2 steps in house, denies difficulty  Reports difficulty with community ambulation with curbs, grass, and extended walking  Needs to hold cart at store  Reports pain in low back described as fatigue and cramping  Feels its hard to stand up straight         Pain  Current pain ratin  At best pain ratin  At worst pain ratin  Location: Post R thigh           Objective        TTP: to R distal Semitendinosus / Semimembranosus        Hip Range of Motion Right  Left   Flexion WLF [facility]   Extension     Abduction [facility] WFL   Ext Rot WFL WFL   Int Rot WFL WFL     Hip MMT Right  Left   Flexion 5 5   Extension     Abduction 4 4   Ext Rot     Int Rot           Knee Range of Motion Right  Left   Flexion [facility] WFL   Extension [facility]          Knee MMT Right  Left   Flexion 5 5   Extension 5 5              Ankle Range of Motion Right  Left    Dorsi flex  4 deg   Plantar flex     Inversion     Eversion            Ankle MMT Right  Left   Dorsi Flex 1 5   Plantar flex              Slump test: neg    SLR test: neg    Lumbar PAIVMs: no tenderness, normal mobility    Hamstring Length: WNL b/l    Gait Observations:         5xSTS: 13s with UE support  20s no UE's       TUs used SPC    DGI:     Precautions: Fibromyalgia, fall risk, type II DM      Manual              IASTM to distal medial hamstring                                                                     Exercise Diary              Sit to stands             Standing hip abd, ext             Standing hamstring curl             NuStep             Step ups              High knee marching             Clamshells              TB extension / rows                                                                                                                                                                             Modalities

## 2019-07-24 ENCOUNTER — APPOINTMENT (OUTPATIENT)
Dept: PHYSICAL THERAPY | Age: 64
End: 2019-07-24
Payer: COMMERCIAL

## 2019-07-26 ENCOUNTER — OFFICE VISIT (OUTPATIENT)
Dept: PHYSICAL THERAPY | Age: 64
End: 2019-07-26
Payer: COMMERCIAL

## 2019-07-26 DIAGNOSIS — S76.311A STRAIN OF RIGHT HAMSTRING MUSCLE, INITIAL ENCOUNTER: Primary | ICD-10-CM

## 2019-07-26 DIAGNOSIS — R26.89 MULTIFACTORIAL GAIT DISORDER: ICD-10-CM

## 2019-07-26 PROCEDURE — 97112 NEUROMUSCULAR REEDUCATION: CPT | Performed by: PHYSICAL THERAPIST

## 2019-07-26 PROCEDURE — 97140 MANUAL THERAPY 1/> REGIONS: CPT | Performed by: PHYSICAL THERAPIST

## 2019-07-26 PROCEDURE — 97110 THERAPEUTIC EXERCISES: CPT | Performed by: PHYSICAL THERAPIST

## 2019-07-26 NOTE — PROGRESS NOTES
Daily Note     Today's date: 2019  Patient name: Anjel Sanchez  : 1955  MRN: [id number]  Referring provider: Daphney Morales , *  Dx:   Encounter Diagnosis     ICD-10-CM    1  Strain of right hamstring muscle, initial encounter [id number]    2  Multifactorial gait disorder R26 89        Start Time: 0950  Stop Time: 1050  Total time in clinic (min): 60 minutes    Subjective: Patient reported bilateral le soreness persists with right hamstring soreness  Patient reported bilateral le pain is at 5 of 10  Patient noted hamstring pain will get worse intermittently, which she noted is aggravated to lift her right le up during transfers  Objective: See treatment diary below  Assessment: Tolerated treatment well  Patient exhibited good technique with therapeutic exercises  Patient presents with bilateral le fatigue with standing based functional activities  Patient presents with short term pain reduction with use of IASTM and manual hamstring stretching  Plan: Continue per plan of care            Precautions: Fibromyalgia, fall risk, type II DM      Manual              IASTM to distal medial hamstring as well as manual hamstring stretch:B:  10 min                                                                   Exercise Diary              Sit to stands  NT           Standing hip abd, ext  NT           Standing hamstring curl  NT           NuStep  8 min           Step ups   NT           High knee marching  NT           Clamshells   NT           TB extension / rows  NT                        Seated left HR and TR:B:  2 x 10           LAQ:B:  3 sec x 20           Hip flexion:B:  2 x 10           SLR flexion x 3:B:  2 x 10           SLS and Tandem stance:B:  30 sec x 2           Mini squats   2 x 10           Lunges:B:  2 x 10                        [employer] balance system: limits of stability                                           Modalities

## 2019-07-31 ENCOUNTER — OFFICE VISIT (OUTPATIENT)
Dept: PHYSICAL THERAPY | Age: 64
End: 2019-07-31
Payer: COMMERCIAL

## 2019-07-31 DIAGNOSIS — S76.311A STRAIN OF RIGHT HAMSTRING MUSCLE, INITIAL ENCOUNTER: Primary | ICD-10-CM

## 2019-07-31 DIAGNOSIS — R26.89 MULTIFACTORIAL GAIT DISORDER: ICD-10-CM

## 2019-07-31 PROCEDURE — 97140 MANUAL THERAPY 1/> REGIONS: CPT | Performed by: PHYSICAL THERAPIST

## 2019-07-31 PROCEDURE — 97110 THERAPEUTIC EXERCISES: CPT | Performed by: PHYSICAL THERAPIST

## 2019-07-31 NOTE — PROGRESS NOTES
Daily Note     Today's date: 2019  Patient name: Moses Bronson  : 1955  MRN: [id number]  Referring provider: Radha Lozano , *  Dx:   Encounter Diagnosis     ICD-10-CM    1  Strain of right hamstring muscle, initial encounter 0699 [id number] [phone]    2  Multifactorial gait disorder R26 89                   Subjective: Pt states that she was very sore after the last session, soreness lasted for three days  Objective: See treatment diary below      Assessment: Tolerated treatment well  Pt was able to perform all exercise without increased pain in her LE  Pt felt relief in hamstring after IASTM; continue in future sessions  Pt had difficultly with balance exercises, continue to address nv  Patient demonstrated fatigue post treatment and would benefit from continued PT to address pain and strength deficits  Plan: Continue per plan of care        Precautions: Fibromyalgia, fall risk, type II DM      Manual             IASTM to distal medial hamstring as well as manual hamstring stretch:B:  10 min 10'                                                                  Exercise Diary             Sit to stands  NT           Standing hip abd, ext  NT 10x ea           Standing hamstring curl  NT 2x10          NuStep  8 min 8'          Step ups   NT           High knee marching  NT           Clamshells   NT           TB extension / rows  NT                        Seated left HR and TR:B:  2 x 10 2x10          LAQ:B:  3 sec x 20 NP          Hip flexion:B:  2 x 10 2X10          SLR flexion x 3:B:  2 x 10 2x10          SLS and Tandem stance:B:  30 sec x 2 30" x 3          Mini squats   2 x 10 2x10          Lunges:B:  2 x 10 2X10                       [employer] balance system: limits of stability                                           Modalities

## 2019-08-02 ENCOUNTER — OFFICE VISIT (OUTPATIENT)
Dept: PHYSICAL THERAPY | Age: 64
End: 2019-08-02
Payer: COMMERCIAL

## 2019-08-02 DIAGNOSIS — R26.89 MULTIFACTORIAL GAIT DISORDER: Primary | ICD-10-CM

## 2019-08-02 DIAGNOSIS — S76.311A STRAIN OF RIGHT HAMSTRING MUSCLE, INITIAL ENCOUNTER: ICD-10-CM

## 2019-08-02 PROCEDURE — 97140 MANUAL THERAPY 1/> REGIONS: CPT | Performed by: PHYSICAL THERAPIST

## 2019-08-02 PROCEDURE — 97112 NEUROMUSCULAR REEDUCATION: CPT | Performed by: PHYSICAL THERAPIST

## 2019-08-02 PROCEDURE — 97110 THERAPEUTIC EXERCISES: CPT | Performed by: PHYSICAL THERAPIST

## 2019-08-02 NOTE — PROGRESS NOTES
Daily Note     Today's date: 2019  Patient name: Amparo Lala  : 1955  MRN: [id number]  Referring provider: Shaan Contreras , *  Dx:   Encounter Diagnosis     ICD-10-CM    1  Multifactorial gait disorder R26 89    2  Strain of right hamstring muscle, initial encounter S76 180A                   Subjective: Patient noted fatigue and soreness is less now versus onset of PT treatment but soreness and fatigue still persist        Objective: See treatment diary below      Assessment: Tolerated treatment well  Patient presents with fatigue during closed kinetic chain activities which mimics her standing functional activity deficits  Patient continues with bilateral le fatigue and weakness limiting balance and standing activities and increases he fall risk thus continued emphasis on balance, gait and strengthening activities to promote long term functional progress  Plan: Continue per plan of care        Precautions: Fibromyalgia, fall risk, type II DM      Manual    8/2         IASTM to distal medial hamstring as well as manual hamstring stretch:B:  10 min 10' 10 min                                                                 Exercise Diary    8/2         Sit to stands  NT  NT         Standing hip abd, ext  NT 10x ea  2 x 10         Standing hamstring curl  NT 2x10 2 x 10         NuStep  8 min 8' 10 min         Step ups   NT  NT         High knee marching  NT  NT         Clamshells   NT  NT         TB extension / rows  NT  NT                      Seated left HR and TR:B:  2 x 10 2x10 2 x 10         LAQ:B:  3 sec x 20 NP NT         Hip flexion:B:  2 x 10 2X10 NT         SLR flexion x 3:B:  2 x 10 2x10 2 x 10         SLS and Tandem stance:B:  30 sec x 2 30" x 3 30 sec x 2         Mini squats   2 x 10 2x10 2 x 10         Lunges:B:  2 x 10 2X10 2 x 10         Side stepping and tandem ambulation in parallel bars    3 x each         [email or web address] balance system: limits of stability    Level 12 x 5                                       Modalities

## 2019-08-07 ENCOUNTER — OFFICE VISIT (OUTPATIENT)
Dept: PHYSICAL THERAPY | Age: 64
End: 2019-08-07
Payer: COMMERCIAL

## 2019-08-07 DIAGNOSIS — S76.311A STRAIN OF RIGHT HAMSTRING MUSCLE, INITIAL ENCOUNTER: ICD-10-CM

## 2019-08-07 DIAGNOSIS — R26.89 MULTIFACTORIAL GAIT DISORDER: Primary | ICD-10-CM

## 2019-08-07 PROCEDURE — 97110 THERAPEUTIC EXERCISES: CPT

## 2019-08-07 PROCEDURE — 97112 NEUROMUSCULAR REEDUCATION: CPT

## 2019-08-07 PROCEDURE — 97140 MANUAL THERAPY 1/> REGIONS: CPT

## 2019-08-07 NOTE — PROGRESS NOTES
Daily Note     Today's date: 2019  Patient name: Gael Eaton  : 1955  MRN: [id number]  Referring provider: Yfn Maier , *  Dx:   Encounter Diagnosis     ICD-10-CM    1  Multifactorial gait disorder R26 89    2  Strain of right hamstring muscle, initial encounter S76 311A                   Subjective: No pain at R LE today       Objective: See treatment diary below      Assessment: Pt tolerated session well  Progress as able     Plan: Continue per plan of care        Precautions: Fibromyalgia, fall risk, type II DM      Manual           IASTM to distal medial hamstring as well as manual hamstring stretch:B:  10 min 10' 10 min 10 min                                                                 Exercise Diary           Sit to stands  NT  NT 20x         Standing hip abd, ext  NT 10x ea  2 x 10 20x         Standing hamstring curl  NT 2x10 2 x 10 20x         NuStep  8 min 8' 10 min 10 min        Step ups   NT  NT         High knee marching  NT  NT 20x         Clamshells   NT  NT 20x 3sec         TB extension / rows  NT  NT                      Seated left HR and TR:B:  2 x 10 2x10 2 x 10 20x         LAQ:B:  3 sec x 20 NP NT 20x 3sec         Hip flexion:B:  2 x 10 2X10 NT NT        SLR flexion x 3:B:  2 x 10 2x10 2 x 10 20x         SLS and Tandem stance:B:  30 sec x 2 30" x 3 30 sec x 2 NT        Mini squats   2 x 10 2x10 2 x 10 20x         Lunges:B:  2 x 10 2X10 2 x 10 20x         Side stepping and tandem ambulation in parallel bars    3 x each 30sec 4x         [employer] balance system: limits of stability    Level 12 x 5 NT                                       Modalities

## 2019-08-09 ENCOUNTER — OFFICE VISIT (OUTPATIENT)
Dept: PHYSICAL THERAPY | Age: 64
End: 2019-08-09
Payer: COMMERCIAL

## 2019-08-09 DIAGNOSIS — R26.89 MULTIFACTORIAL GAIT DISORDER: Primary | ICD-10-CM

## 2019-08-09 DIAGNOSIS — S76.311A STRAIN OF RIGHT HAMSTRING MUSCLE, INITIAL ENCOUNTER: ICD-10-CM

## 2019-08-09 PROCEDURE — 97140 MANUAL THERAPY 1/> REGIONS: CPT

## 2019-08-09 PROCEDURE — 97112 NEUROMUSCULAR REEDUCATION: CPT

## 2019-08-09 PROCEDURE — 97110 THERAPEUTIC EXERCISES: CPT

## 2019-08-09 NOTE — PROGRESS NOTES
Daily Note     Today's date: 2019  Patient name: Cornell Sheldon  : 1955  MRN: [id number]  Referring provider: Juan Phelps , *  Dx:   Encounter Diagnosis     ICD-10-CM    1  Multifactorial gait disorder R26 89    2  Strain of right hamstring muscle, initial encounter S76 770A                   Subjective: Pt reported pain at L ankle since last PT session  Objective: See treatment diary below      Assessment: Modified session to accommodate L ankle symptoms  Tender areas noted at L ham with [place], progress as able     Plan: Continue per plan of care        Precautions: Fibromyalgia, fall risk, type II DM      Manual           IASTM to distal medial hamstring as well as manual hamstring stretch:B:  10 min 10' 10 min 10 min                                                                 Exercise Diary                        Standing hip abd, ext  NT 10x ea  2 x 10 20x  20x        Standing hamstring curl  NT 2x10 2 x 10 20x  20x        NuStep  8 min 8' 10 min 10 min 10 min                      Briges      20x 3sec       Clamshells   NT  NT 20x 3sec  [id number]        TB extension / rows  NT  NT  NT                     Seated left HR and TR:B:  2 x 10 2x10 2 x 10 20x  20x        LAQ:B:  3 sec x 20 NP NT 20x 3sec  20x  3sec        Hip flexion:B:  2 x 10 2X10 NT NT 20x        SLR flexion x 3:B:  2 x 10 2x10 2 x 10 20x  20x        SLS alt LE B              Tandem stance:B:  30 sec x 2 30" x 3 30 sec x 2 NT 10 sec 5x        Mini squats   2 x 10 2x10 2 x 10 20x  20x                      Side stepping and tandem ambulation in parallel bars    3 x each 30sec 4x  6x        [employer] balance system: limits of stability    Level 12 x 5 NT  LOS   Level 10 3x                                     Modalities

## 2019-08-14 ENCOUNTER — OFFICE VISIT (OUTPATIENT)
Dept: PHYSICAL THERAPY | Age: 64
End: 2019-08-14
Payer: COMMERCIAL

## 2019-08-14 DIAGNOSIS — S76.311A STRAIN OF RIGHT HAMSTRING MUSCLE, INITIAL ENCOUNTER: ICD-10-CM

## 2019-08-14 DIAGNOSIS — R26.89 MULTIFACTORIAL GAIT DISORDER: Primary | ICD-10-CM

## 2019-08-14 PROCEDURE — 97110 THERAPEUTIC EXERCISES: CPT | Performed by: PHYSICAL THERAPIST

## 2019-08-14 NOTE — PROGRESS NOTES
PT Evaluation / PT Reassessment    Today's date: 2019  Patient name: Bob Etienne  : 1955  MRN: [id number]  Referring provider: Stefani Arellano , *  Dx:   Encounter Diagnosis     ICD-10-CM    1  Multifactorial gait disorder R26 89    2  Strain of right hamstring muscle, initial encounter S76 311A                   Assessment  Assessment details: PT Reassessment: 2019  Patient reported the following progress since onset of PT: significant decrease in right hamstring pain, car transfers, and walking  Patient reported the following deficits still persist: difficulty with stair climbing, prolonged walking and standing, bilateral le fatigue and weakness  Patient noted her right ankle and bilateral le impairments are limiting to her functional mobility, not her right hamstring region pain  Patient noted her lumbar spine cramping and pain also limits prolonged standing and walking based functional activities  Patient noted she has to use bilateral ue support to "pull herself up" the stairs  PT IE:  Luis Daniel Souza reports to PT with CC of poor mobility and pain in R hamstrings  Results of eval indicate LE weakness, tenderness to R Semitendinosus / Semimembranosus tendons, and impaired dynamic balance  Pt is at risk of falls based on DGI  Testing supports dx of strain to hamstrings, testing was negative for lumbar radiculopathy or neural tension  These impairments are resulting in difficulty with community ambulation, negotiating stair and curbs, and tolerating standing  Would benefit from skilled PT to address these deficits      Impairments: abnormal gait, abnormal or restricted ROM, abnormal movement, impaired physical strength and pain with function  Barriers to therapy: B/l ankle reconstruction   Understanding of Dx/Px/POC: good   Prognosis: good  Prognosis details: Patient presents with the following progress since onset of PT: decrease in right hamstring pain, increase in bilateral le rom and strength, gait and stair improvements, balance improvements and functional progress  But, she presents with the following deficits that still persist: right distal le pain, decrease in right le rom and strength, gait and stair dysfunctions, balance deficits, transfer dysfunctions and functional deficits  Thus, pt would benefit from skilled PT services to address the above noted deficits with the following PT treatment plan: therapeutic exercises and activities to facilitate bilateral le rom and strength, modalities, manual therapy techniques, gait and stair training, transfer training, balance and proprioception activities, IASTM techniques, Kinesio taping techniques and a hep  Goals  STGs (4 weeks)  Pt will be independent with comprehensive HEP    MET  Pt will report reduced pain at worst by at least 3 points   MET       LTG's (to be achieved by d/c)  Pt will be able to self manage sx's independently   Partially MET  Pt will demonstrate at least 3 point improvement on DGI  Partially MET  Pt will be able to safely negotiate curbs with least restrictive AD  Partially MET  Pt will report at least 50% reduction with difficulty walking in store due to low back fatigue  Partially MET  Pt will improve on 5xSTS test by at least 5 seconds   Partially MET      Plan  Plan details: Initiate POC  Patient would benefit from: skilled physical therapy  Planned modality interventions: cryotherapy, TENS, thermotherapy: hydrocollator packs and unattended electrical stimulation  Planned therapy interventions: manual therapy, balance, home exercise program, therapeutic activities, therapeutic exercise, stretching, strengthening, patient education, neuromuscular re-education, postural training, body mechanics training, compression, self care, flexibility, functional ROM exercises, therapeutic training, transfer training, gait training, graded activity, graded exercise and graded motor  Frequency: 2x week  Duration in weeks: 8  Plan of Care beginning date: 2019  Plan of Care expiration date: 2019  Treatment plan discussed with: patient        Subjective Evaluation    History of Present Illness  Mechanism of injury: Pt presents to PT with CC of pain in posterior R thigh and overall difficulty with walking  States that 6 months ago had pain in R post thigh while getting into her car  States that she notices the pain when sitting down to fast and still when getting in/out of car, doing stairs, and pressing down on gas peddle  Has b/l ankle reconstructions due to pes planus with fusion, most recent in   The fusion failed on R side which resulted in needing to use a hinged ankle foot orthosis at all times  States that this failure on R ankle really led to a decline in her mobility  Denies ankle pain  Uses SPC to help with her balance at most times  Feels more unsteady since getting special orthopedic shoes  Lives in 1 story home with 2 steps in house, denies difficulty  Reports difficulty with community ambulation with curbs, grass, and extended walking  Needs to hold cart at store  Reports pain in low back described as fatigue and cramping  Feels its hard to stand up straight         Pain  Current pain ratin  At best pain ratin  At worst pain ratin  Location: Post R thigh           Objective           Hip Range of Motion Right  Left   Flexion WLF [facility]   Extension     Abduction [facility] WFL   Ext Rot WFL WFL   Int Rot WFL WFL     Hip MMT Right  Left   Flexion 5 5   Extension 4 4   Abduction 4 4   Adduction 4+ 4+   Ext Rot     Int Rot           Knee Range of Motion Right  Left   Flexion [facility]   Extension [facility] WFL          Knee MMT Right  Left   Flexion 5 5   Extension 5 5              Ankle Range of Motion Right  Left    Dorsi flex  4 deg   Plantar flex 25 35   Inversion     Eversion            Ankle MMT Right  Left   Dorsi Flex 1 5   Plantar flex 3+ 5            Slump test: neg    SLR test: neg    Lumbar PAIVMs: no tenderness, normal mobility    Hamstring Length: WNL b/l    Gait Observations: pt ambulates with SPC and right le AFO with decrease in pace, increase in base of support, decrease in bilateral step length, decrease in right ankle DF with swing phase  Stair Climbing: pt performs stair climbing with bilateral ue support and a non reciprocal pattern  5xSTS: 12 05sec with UE support  16 25 sec no UE's  TU 59sec used SPC    DGI:  with score as follows #1-5 a score of 3; #6 a score of 1, #7 a score of 3 and #8 a score of 1      Precautions: Fibromyalgia, fall risk, type II DM        Manual               IASTM to distal medial hamstring as well as manual hamstring stretch:B:   10 min 10' 10 min 10 min   NT                                                                                                                 Exercise Diary                                     Standing hip abd, ext   NT 10x ea  2 x 10 20x  20x   2 x 10         Standing hamstring curl   NT 2x10 2 x 10 20x  20x   2 x 10         NuStep   8 min 8' 10 min 10 min 10 min   10 min                                 Briges           20x 3sec  NT         Clamshells    NT   NT 20x 3sec  [id number]   NT         TB extension / rows   NT   NT   NT   NT                                 Seated left HR and TR:B:   2 x 10 2x10 2 x 10 20x  20x   NT         LAQ:B:   3 sec x 20 NP NT 20x 3sec  20x  3sec   hold         Hip flexion:B:   2 x 10 2X10 NT NT 20x   hold         SLR flexion x 3:B:   2 x 10 2x10 2 x 10 20x  20x   1 5 # x20         SLS alt LE B                       Tandem stance:B:   30 sec x 2 30" x 3 30 sec x 2 NT 10 sec 5x   30 sec x 2         Mini squats    2 x 10 2x10 2 x 10 20x  20x   2 x 10                                 Side stepping and tandem ambulation in parallel bars       3 x each 30sec 4x  6x   6 x          [employer] balance system: limits of stability       Level 12 x 5 NT  LOS   Level 10 3x  LOS level 10 x 4                                                               Modalities

## 2019-08-16 ENCOUNTER — EVALUATION (OUTPATIENT)
Dept: PHYSICAL THERAPY | Age: 64
End: 2019-08-16
Payer: COMMERCIAL

## 2019-08-16 DIAGNOSIS — S76.311A STRAIN OF RIGHT HAMSTRING MUSCLE, INITIAL ENCOUNTER: ICD-10-CM

## 2019-08-16 DIAGNOSIS — R26.89 MULTIFACTORIAL GAIT DISORDER: Primary | ICD-10-CM

## 2019-08-16 PROCEDURE — 97110 THERAPEUTIC EXERCISES: CPT

## 2019-08-16 PROCEDURE — 97112 NEUROMUSCULAR REEDUCATION: CPT

## 2019-08-16 NOTE — PROGRESS NOTES
Daily Note     Today's date: 2019  Patient name: Nidhi Wyatt  : 1955  MRN: [id number]  Referring provider: Do Diallo , *  Dx:   Encounter Diagnosis     ICD-10-CM    1  Multifactorial gait disorder R26 89    2  Strain of right hamstring muscle, initial encounter S76 135A                   Subjective: Pt reported that LB and R LE feel better today       Objective: See treatment diary below      Assessment: Tolerated progression well  Plan: Cont with Plan of care       5xSTS: 12 05sec with UE support  16 25 sec no UE's  TU 59sec used SPC    DGI:  with score as follows #1-5 a score of 3; #6 a score of 1, #7 a score of 3 and #8 a score of 1      Precautions: Fibromyalgia, fall risk, type II DM        Manual               IASTM to distal medial hamstring as well as manual hamstring stretch:B:   10 min 10' 10 min 10 min   NT                                                                                                                 Exercise Diary                                   Standing hip abd, ext   NT 10x ea  2 x 10 20x  20x   2 x 10  20x        Standing hamstring curl   NT 2x10 2 x 10 20x  20x   2 x 10  20x        NuStep   8 min 8' 10 min 10 min 10 min   10 min  10 min         hip add with ball                        Briges           20x 3sec  NT  20x 3sec       Clamshells    NT   NT 20x 3sec  [id number]   NT  20x 3sec        TB extension / rows   NT   NT   NT   NT  GTB 20x                                Seated left HR and TR:B:   2 x 10 2x10 2 x 10 20x  20x   NT  20x                                    SLR flexion x 3:B:   2 x 10 2x10 2 x 10 20x  20x   1 5 # x20  1 5# 20x        SLS alt LE B                30sec 5x        Tandem stance:B:   30 sec x 2 30" x 3 30 sec x 2 NT 10 sec 5x   30 sec x 2  30s sec 5x        Mini squats    2 x 10 2x10 2 x 10 20x  20x   2 x 10  20x                                Side stepping and tandem ambulation in parallel bars       3 x each 30sec 4x  6x   6 x   6x        [email or web address] balance system: limits of stability       Level 12 x 5 NT  LOS   Level 10 3x  LOS level 10 x 4  LOS  Level 10x 3x                                                              Modalities

## 2019-08-21 ENCOUNTER — OFFICE VISIT (OUTPATIENT)
Dept: PHYSICAL THERAPY | Age: 64
End: 2019-08-21
Payer: COMMERCIAL

## 2019-08-21 DIAGNOSIS — S76.311A STRAIN OF RIGHT HAMSTRING MUSCLE, INITIAL ENCOUNTER: ICD-10-CM

## 2019-08-21 DIAGNOSIS — R26.89 MULTIFACTORIAL GAIT DISORDER: Primary | ICD-10-CM

## 2019-08-21 PROCEDURE — 97110 THERAPEUTIC EXERCISES: CPT | Performed by: PHYSICAL THERAPIST

## 2019-08-21 NOTE — PROGRESS NOTES
Daily Note     Today's date: 2019  Patient name: Rian Nolasco  : 1955  MRN: [id number]  Referring provider: Juan M Kirkpatrick , *  Dx:   Encounter Diagnosis     ICD-10-CM    1  Multifactorial gait disorder R26 89    2  Strain of right hamstring muscle, initial encounter S76 311A                   Subjective: Patient reported bilateral ankle soreness persists  Objective: See treatment diary below  Assessment: Patient presents with bilateral le fatigue and sob limiting prolonged standing based therapeutic exercises and activities that mimic standing based functional activities        Plan: Cont with Plan of care          Precautions: Fibromyalgia, fall risk, type II DM        Manual           IASTM to distal medial hamstring as well as manual hamstring stretch:B:   10 min 10' 10 min 10 min   NT  NT NT                                                                                                              Exercise Diary                                 Standing hip abd, ext   NT 10x ea  2 x 10 20x  20x   2 x 10  20x   see below     Standing hamstring curl   NT 2x10 2 x 10 20x  20x   2 x 10  20x   2#x20     NuStep   8 min 8' 10 min 10 min 10 min   10 min  10 min   10 min      hip add with ball                        Briges           20x 3sec  NT  20x 3sec  NT     Clamshells    NT   NT 20x 3sec  [id number]   NT  20x 3sec   NT     TB extension / rows   NT   NT   NT   NT  GTB 20x   GTB x 20                             Seated left HR and TR:B:   2 x 10 2x10 2 x 10 20x  20x   NT  20x   2 x 10 and hip flexion:B: x 20                                 SLR flexion x 3:B:   2 x 10 2x10 2 x 10 20x  20x   1 5 # x20  1 5# 20x   2#x20     SLS alt LE B                30sec 5x   30 sec x 2 on each le     Tandem stance:B:   30 sec x 2 30" x 3 30 sec x 2 NT 10 sec 5x   30 sec x 2  30s sec 5x  30 sec x 4 on each le     Mini squats    2 x 10 2x10 2 x 10 20x  20x   2 x 10  20x   2 x 10                             Side stepping and tandem ambulation in parallel bars       3 x each 30sec 4x  6x   6 x   6x   6 x     [employer] balance system: limits of stability       Level 12 x 5 NT  LOS   Level 10 3x  LOS level 10 x 4  LOS  Level 10x 3x   LOS level 10 x 5                                                           Modalities

## 2019-08-23 ENCOUNTER — OFFICE VISIT (OUTPATIENT)
Dept: PHYSICAL THERAPY | Age: 64
End: 2019-08-23
Payer: COMMERCIAL

## 2019-08-23 DIAGNOSIS — S76.311A STRAIN OF RIGHT HAMSTRING MUSCLE, INITIAL ENCOUNTER: ICD-10-CM

## 2019-08-23 DIAGNOSIS — R26.89 MULTIFACTORIAL GAIT DISORDER: Primary | ICD-10-CM

## 2019-08-23 PROCEDURE — 97112 NEUROMUSCULAR REEDUCATION: CPT

## 2019-08-23 PROCEDURE — 97110 THERAPEUTIC EXERCISES: CPT

## 2019-08-23 NOTE — PROGRESS NOTES
Daily Note     Today's date: 2019  Patient name: Demetrius Light  : 1955  MRN: [id number]  Referring provider: Henok Ritter , *  Dx:   Encounter Diagnosis     ICD-10-CM    1  Multifactorial gait disorder R26 89    2  Strain of right hamstring muscle, initial encounter S76 311A                   Subjective: Pt noted feeling better, no pain today        Objective: See treatment diary below      Assessment: Tolerated treatment and progression well     Plan: Cont with Plan of care      Precautions: Fibromyalgia, fall risk, type II DM        Manual           IASTM to distal medial hamstring as well as manual hamstring stretch:B:   10 min 10' 10 min 10 min   NT  NT NT                                                                                                              Exercise Diary                                             Standing hamstring curl   NT 2x10 2 x 10 20x  20x   2 x 10  20x   2#x20  20x 2 5#    NuStep   8 min 8' 10 min 10 min 10 min   10 min  10 min   10 min  10 min     hip add with ball                     20x 5sec    Bridges           20x 3sec  NT  20x 3sec  NT  20x                 Clamshells    NT   NT 20x 3sec  [id number]   NT  20x 3sec   NT  20x 3sec with BTB    TB extension / rows   NT   NT   NT   NT  GTB 20x   GTB x 20  20x GTB     Supine SLR B                    20x    Seated left HR and TR   2 x 10 2x10 2 x 10 20x  20x   NT  20x   2 x 10 and hip flexion:B: x 20  20x                                SLR flexion x 3:B:   2 x 10 2x10 2 x 10 20x  20x   1 5 # x20  1 5# 20x   2#x20  20x 2 5#    SLS alt LE B                30sec 5x   30 sec x 2 on each le  30sec 2x B    Tandem stance:B:   30 sec x 2 30" x 3 30 sec x 2 NT 10 sec 5x   30 sec x 2  30s sec 5x  30 sec x 4 on each le  30sec 5x B    Mini squats    2 x 10 2x10 2 x 10 20x  20x   2 x 10  20x   2 x 10  20x                            Side stepping and tandem ambulation in parallel bars       3 x each 30sec 4x  6x   6 x   6x   6 x  6x    [employer] balance system: limits of stability       Level 12 x 5 NT  LOS   Level 10 3x  LOS level 10 x 4  LOS  Level 10x 3x   LOS level 10 x 5  LOS   Level 12  Full difficulty 4x                                                              Modalities

## 2019-08-28 ENCOUNTER — OFFICE VISIT (OUTPATIENT)
Dept: PHYSICAL THERAPY | Age: 64
End: 2019-08-28
Payer: COMMERCIAL

## 2019-08-28 DIAGNOSIS — S76.311A STRAIN OF RIGHT HAMSTRING MUSCLE, INITIAL ENCOUNTER: Primary | ICD-10-CM

## 2019-08-28 DIAGNOSIS — R26.89 MULTIFACTORIAL GAIT DISORDER: ICD-10-CM

## 2019-08-28 PROCEDURE — 97112 NEUROMUSCULAR REEDUCATION: CPT

## 2019-08-28 PROCEDURE — 97110 THERAPEUTIC EXERCISES: CPT

## 2019-08-28 NOTE — PROGRESS NOTES
Daily Note     Today's date: 2019  Patient name: Tyrel Schwarz  : 1955  MRN: [id number]  Referring provider: Cristian Carreon , *  Dx:   Encounter Diagnosis     ICD-10-CM    1  Strain of right hamstring muscle, initial encounter 0699 [id number]    2  Multifactorial gait disorder R26 89                   Subjective: Pt noted R LE tightness over the last few days  Objective: See treatment diary below      Assessment: Pt tolerated session fairly well today, stretch exercises provided relief to LE symptoms      Plan: Cont with Plan of care      Precautions: Fibromyalgia, fall risk, type II DM        Manual           IASTM to distal medial hamstring as well as manual hamstring stretch:B:   10 min 10' 10 min 10 min   NT  NT NT                                                                                                              Exercise Diary                                            Standing hamstring curl  20x 2 5#             NuStep  10 min              hip add with ball   20x 5sec             Bridges                           Clamshells   20x 3sec with BTB             TB extension / rows  20x BTB              Supine SLR B  20x 2 5#              Seated left HR and TR  20x 2 5#                                        SLR flexion x 3:B:  20x 2 5#            SLS alt LE B  5sec 10x             Tandem stance:B:  20x 2 5#            Mini squats   20x                            Side stepping and tandem ambulation in parallel bars  6x 2 5#             [employer] balance system: limits of stability  LOS   Level 12  5x                                                                   Modalities

## 2019-08-29 ENCOUNTER — TELEPHONE (OUTPATIENT)
Dept: FAMILY MEDICINE CLINIC | Facility: CLINIC | Age: 64
End: 2019-08-29

## 2019-08-29 LAB
LEFT EYE DIABETIC RETINOPATHY: NORMAL
RIGHT EYE DIABETIC RETINOPATHY: NORMAL

## 2019-08-29 NOTE — TELEPHONE ENCOUNTER
----- Message from David Page sent at 8/29/2019  3:40 PM EDT -----  Regarding: Non-Urgent Medical Question  Contact: [phone]  Porfirio Hilario been taking Aleve 2 tablets in the morning and 2 at night since my last visit and I am very constipated  I tried taking 3 over the counter stool softeners a day and I am not getting enough relief  Is there something else I can take to resolve this problem?     Thank you,  Gerda Reasoner

## 2019-08-30 ENCOUNTER — OFFICE VISIT (OUTPATIENT)
Dept: PHYSICAL THERAPY | Age: 64
End: 2019-08-30
Payer: COMMERCIAL

## 2019-08-30 DIAGNOSIS — S76.311A STRAIN OF RIGHT HAMSTRING MUSCLE, INITIAL ENCOUNTER: Primary | ICD-10-CM

## 2019-08-30 DIAGNOSIS — R26.89 MULTIFACTORIAL GAIT DISORDER: ICD-10-CM

## 2019-08-30 PROCEDURE — 97110 THERAPEUTIC EXERCISES: CPT | Performed by: PHYSICAL THERAPIST

## 2019-08-30 PROCEDURE — 97164 PT RE-EVAL EST PLAN CARE: CPT | Performed by: PHYSICAL THERAPIST

## 2019-08-30 NOTE — PROGRESS NOTES
PT Evaluation     Today's date: 2019  Patient name: Aurora Crespo  : 1955  MRN: [id number]  Referring provider: Zaira Alvarado , *  Dx:   Encounter Diagnosis     ICD-10-CM    1  Strain of right hamstring muscle, initial encounter [id number]    2  Multifactorial gait disorder R26 89                   Assessment  Assessment details: PT Reassessment: 19  Patient reported 75 % improvement since onset of PT  Patient reported standing, walking, grocery store shopping activities all have improved as well as denial of right hamstring region pain  Patient noted chronic bilateral ankle pain limits prolonged standing based functional activities, not right hamstring region pain  PT IE: Josephine Oliva reports to PT with CC of poor mobility and pain in R hamstrings  Results of eval indicate LE weakness, tenderness to R Semitendinosus / Semimembranosus tendons, and impaired dynamic balance  Pt is at risk of falls based on DGI  Testing supports dx of strain to hamstrings, testing was negative for lumbar radiculopathy or neural tension  These impairments are resulting in difficulty with community ambulation, negotiating stair and curbs, and tolerating standing  Would benefit from skilled PT to address these deficits  Barriers to therapy: B/l ankle reconstruction   Understanding of Dx/Px/POC: good   Prognosis: good    Goals  STGs (4 weeks)  Pt will be independent with comprehensive HEP   MET  Pt will report reduced pain at worst by at least 3 points   MET       LTG's (to be achieved by d/c)  Pt will be able to self manage sx's independently   MET  Pt will demonstrate at least 3 point improvement on DGI  MET  Pt will be able to safely negotiate curbs with least restrictive AD  MET  Pt will report at least 50% reduction with difficulty walking in store due to low back fatigue  MET  Pt will improve on 5xSTS test by at least 5 seconds   MET      Plan  Plan details: Initiate POC  Patient would benefit from: skilled physical therapy  Planned therapy interventions: manual therapy, balance, home exercise program, therapeutic activities, therapeutic exercise, stretching, strengthening, patient education and neuromuscular re-education  Frequency: 2x week  Duration in weeks: 8  Plan of Care beginning date: 2019  Plan of Care expiration date: 10/31/2019  Treatment plan discussed with: patient        Subjective Evaluation    History of Present Illness  Mechanism of injury: Pt presents to PT with CC of pain in posterior R thigh and overall difficulty with walking  States that 6 months ago had pain in R post thigh while getting into her car  States that she notices the pain when sitting down to fast and still when getting in/out of car, doing stairs, and pressing down on gas peddle  Has b/l ankle reconstructions due to pes planus with fusion, most recent in   The fusion failed on R side which resulted in needing to use a hinged ankle foot orthosis at all times  States that this failure on R ankle really led to a decline in her mobility  Denies ankle pain  Uses SPC to help with her balance at most times  Feels more unsteady since getting special orthopedic shoes  Lives in 1 story home with 2 steps in house, denies difficulty  Reports difficulty with community ambulation with curbs, grass, and extended walking  Needs to hold cart at store  Reports pain in low back described as fatigue and cramping  Feels its hard to stand up straight         Pain  Current pain ratin  At best pain ratin  At worst pain ratin  Location: Post R thigh           Objective        TTP: to R distal Semitendinosus / Semimembranosus        Hip Range of Motion Right  Left   Flexion WLF [facility]   Extension     Abduction [facility] WFL   Ext Rot WFL WFL   Int Rot WFL WFL     Hip MMT Right  Left   Flexion 5 5   Extension     Abduction 4 4   Ext Rot     Int Rot           Knee Range of Motion Right  Left   Flexion [facility]   Extension [facility] Knee MMT Right  Left   Flexion 5 5   Extension 5 5              Ankle Range of Motion Right  Left    Dorsi flex  4 deg   Plantar flex     Inversion     Eversion            Ankle MMT Right  Left   Dorsi Flex 1 5   Plantar flex              Slump test: neg    SLR test: neg    Lumbar PAIVMs: no tenderness, normal mobility    Hamstring Length: WNL b/l    Gait Observations:         5xSTS: 13s with UE support  20s no UE's       TUs used SPC    DGI:     Precautions: Fibromyalgia, fall risk, type II DM      Manual              IASTM to distal medial hamstring                                                                     Exercise Diary              Sit to stands             Standing hip abd, ext             Standing hamstring curl             NuStep             Step ups              High knee marching             Clamshells              TB extension / rows                                                                                                                                                                             Modalities

## 2019-08-30 NOTE — PROGRESS NOTES
PT Evaluation / PT Reassessment / PT Discharge    Today's date: 2019  Patient name: Lizzeth Watt  : 1955  MRN: [id number]  Referring provider: Brenton Maya , *  Dx:   Encounter Diagnosis     ICD-10-CM    1  Strain of right hamstring muscle, initial encounter [id number]    2  Multifactorial gait disorder R26 89                   Assessment  Assessment details: PT Reassessment: 19  Patient reported 75 % improvement since onset of PT  Patient reported standing, walking, grocery store shopping activities all have improved as well as denial of right hamstring region pain  Patient noted chronic bilateral ankle pain limits prolonged standing based functional activities, not right hamstring region pain  PT Reassessment: 2019  Patient reported the following progress since onset of PT: significant decrease in right hamstring pain, car transfers, and walking  Patient reported the following deficits still persist: difficulty with stair climbing, prolonged walking and standing, bilateral le fatigue and weakness  Patient noted her right ankle and bilateral le impairments are limiting to her functional mobility, not her right hamstring region pain  Patient noted her lumbar spine cramping and pain also limits prolonged standing and walking based functional activities  Patient noted she has to use bilateral ue support to "pull herself up" the stairs  PT IE:  Isidro Adams reports to PT with CC of poor mobility and pain in R hamstrings  Results of eval indicate LE weakness, tenderness to R Semitendinosus / Semimembranosus tendons, and impaired dynamic balance  Pt is at risk of falls based on DGI  Testing supports dx of strain to hamstrings, testing was negative for lumbar radiculopathy or neural tension  These impairments are resulting in difficulty with community ambulation, negotiating stair and curbs, and tolerating standing  Would benefit from skilled PT to address these deficits  Impairments: abnormal gait, abnormal or restricted ROM, abnormal movement, impaired physical strength and pain with function  Barriers to therapy: B/l ankle reconstruction   Understanding of Dx/Px/POC: good   Prognosis: good  Prognosis details: Patient presents with the following progress since onset of PT: decrease in right hamstring pain, increase in bilateral le rom and strength, gait and stair improvements, balance improvements and functional progress  Thus, due to functional and impairment progress requests d/c to hep  Thus, pt provided with final hep and d/c to hep  Goals  STGs (4 weeks)  Pt will be independent with comprehensive HEP    MET  Pt will report reduced pain at worst by at least 3 points   MET       LTG's (to be achieved by d/c)  Pt will be able to self manage sx's independently   MET  Pt will demonstrate at least 3 point improvement on DGI    MET  Pt will be able to safely negotiate curbs with least restrictive AD  Partially MET  Pt will report at least 50% reduction with difficulty walking in store due to low back fatigue  MET  Pt will improve on 5xSTS test by at least 5 seconds   MET      Plan  Plan details: Initiate POC  Patient would benefit from: skilled physical therapy  Planned modality interventions: cryotherapy, TENS, thermotherapy: hydrocollator packs and unattended electrical stimulation  Planned therapy interventions: manual therapy, balance, home exercise program, therapeutic activities, therapeutic exercise, stretching, strengthening, patient education, neuromuscular re-education, postural training, body mechanics training, compression, self care, flexibility, functional ROM exercises, therapeutic training, transfer training, gait training, graded activity, graded exercise and graded motor  Frequency: 2x week  Duration in weeks: 8  Plan of Care beginning date: 8/14/2019  Plan of Care expiration date: 11/1/2019  Treatment plan discussed with: patient        Subjective Evaluation    History of Present Illness  Mechanism of injury: Pt presents to PT with CC of pain in posterior R thigh and overall difficulty with walking  States that 6 months ago had pain in R post thigh while getting into her car  States that she notices the pain when sitting down to fast and still when getting in/out of car, doing stairs, and pressing down on gas peddle  Has b/l ankle reconstructions due to pes planus with fusion, most recent in   The fusion failed on R side which resulted in needing to use a hinged ankle foot orthosis at all times  States that this failure on R ankle really led to a decline in her mobility  Denies ankle pain  Uses SPC to help with her balance at most times  Feels more unsteady since getting special orthopedic shoes  Lives in 1 story home with 2 steps in house, denies difficulty  Reports difficulty with community ambulation with curbs, grass, and extended walking  Needs to hold cart at store  Reports pain in low back described as fatigue and cramping  Feels its hard to stand up straight         Pain  Current pain ratin  At best pain ratin  At worst pain ratin  Location: Post R thigh           Objective           Hip Range of Motion Right  Left   Flexion WLF [facility]   Extension     Abduction [facility] WFL   Ext Rot [facility] WFL   Int Rot WFL WFL     Hip MMT Right  Left   Flexion 5 5   Extension 4+ 4+   Abduction 4 4   Adduction 5 5   Ext Rot     Int Rot           Knee Range of Motion Right  Left   Flexion [facility] WFL   Extension [facility]          Knee MMT Right  Left   Flexion 5 5   Extension 5 5              Ankle Range of Motion Right  Left    Dorsi flex  4 deg   Plantar flex 25 35   Inversion     Eversion            Ankle MMT Right  Left   Dorsi Flex 1 5   Plantar flex 4- 5            Slump test: neg    SLR test: neg    Lumbar PAIVMs: no tenderness, normal mobility    Hamstring Length: WNL b/l    Gait Observations: Patient ambulates with SPC and right le AFO with decrease in pace, increase in base of support, decrease in bilateral step length, decrease in right ankle DF with swing phase  Stair Climbing: pt performs stair climbing with bilateral ue support and a non reciprocal pattern  5xSTS: 11 42 sec with UE support  16 05 sec no UE's  TU 40 sec used SPC    DGI:  with score as follows #1-5 a score of 3; #6 a score of 1, #7 a score of 3 and #8 a score of 1      Precautions: Fibromyalgia, fall risk, type II DM        Manual     8       IASTM to distal medial hamstring as well as manual hamstring stretch:B:   10 min 10' 10 min 10 min   NT  NT NT                                                                                                              Exercise Diary                                                                     Standing hamstring curl  20x 2 5#   20 x 2 5#                   NuStep  10 min   10 min                    hip add with ball   20x 5sec   5 sec x 20                   Bridges                                               Clamshells   20x 3sec with BTB   20 x with 3 sec hold and BTB                   TB extension / rows  20x BTB   BTB x 20                    Supine SLR B  20x 2 5#   2 x 10                   Seated left HR and TR  20x 2 5#  2 x 10                                                                   SLR flexion x 3:B:  20x 2 5#  2 5#x20                   SLS alt LE B  5sec 10x   10 sec x 5                   Tandem stance:B:  20x 2 5#  10 sec x 5 with 2 5#                   Mini squats   20x   2 x 10                                           Side stepping and tandem ambulation in parallel bars  6x 2 5#   5 x each with 2 5#                   SamEnrico balance system: limits of stability  LOS   Level 12  5x   LOS level 11 x 5                                                                         Modalities

## 2019-09-15 DIAGNOSIS — G47.01 INSOMNIA DUE TO MEDICAL CONDITION: ICD-10-CM

## 2019-09-16 RX ORDER — TRAZODONE HYDROCHLORIDE 50 MG/1
TABLET ORAL
Qty: 90 TABLET | Refills: 3 | Status: SHIPPED | OUTPATIENT
Start: 2019-09-16 | End: 2020-04-08 | Stop reason: SDUPTHER

## 2019-10-24 ENCOUNTER — DOCUMENTATION (OUTPATIENT)
Dept: FAMILY MEDICINE CLINIC | Facility: CLINIC | Age: 64
End: 2019-10-24

## 2019-10-24 ENCOUNTER — PATIENT MESSAGE (OUTPATIENT)
Dept: FAMILY MEDICINE CLINIC | Facility: CLINIC | Age: 64
End: 2019-10-24

## 2019-10-24 DIAGNOSIS — E11.9 TYPE 2 DIABETES MELLITUS WITHOUT COMPLICATION, WITHOUT LONG-TERM CURRENT USE OF INSULIN (HCC): Primary | ICD-10-CM

## 2019-10-24 RX ORDER — METFORMIN HYDROCHLORIDE 500 MG/1
1000 TABLET, EXTENDED RELEASE ORAL DAILY
Qty: 180 TABLET | Refills: 2 | Status: SHIPPED | OUTPATIENT
Start: 2019-10-24 | End: 2019-10-30 | Stop reason: SDUPTHER

## 2019-10-24 NOTE — PROGRESS NOTES
Per discussion with PCP, will convert metformin ER (generic Glumetza) to metformin ER (generic Glucophage XR) d/t cost savings       Demographics  Interaction Method: Virtual    Topic(s) Addressed  Diabetes    Intervention(s) Made  Pharmacologic: Medication Conversion - Non-Preferred to Preferred    Tool(s) Used  Other    Time Spent in Direct Patient Care Activities and Care Coordination: 0-15 Minutes    Recommendation(s) Accepted by the Patient/Caregiver: Not Applicable

## 2019-10-30 ENCOUNTER — DOCUMENTATION (OUTPATIENT)
Dept: FAMILY MEDICINE CLINIC | Facility: CLINIC | Age: 64
End: 2019-10-30

## 2019-10-30 RX ORDER — METFORMIN HYDROCHLORIDE 500 MG/1
1000 TABLET, EXTENDED RELEASE ORAL DAILY
Qty: 180 TABLET | Refills: 2 | Status: SHIPPED | OUTPATIENT
Start: 2019-10-30 | End: 2020-07-06

## 2019-10-30 NOTE — PROGRESS NOTES
[address] [facility]  Lilly Lagunas, PharmD    Reason for documentation: Per PCP request, patient contacted regarding metformin question  Patient's insurance coverage: Payor: [address] [place] [address] REP / Plan: [employer] PPO [facility] REP / Product Type: Medicare PPO /     Findings: Noted metformin was sent to [facility] pharmacy instead of [facility]  Phone call placed to discuss this change with the patient  The patient reports previous 90-day supply of metformin was > $100 and quoted price from [facility] was < $5  Pt was thankful for change in rx price and denied further questions at this time  PCP: Will pend metformin rx to [facility] and cancel pending rx at [facility]       Demographics  Interaction Method: Phone  Type of Intervention: Follow-Up    Topic(s) Addressed  Diabetes    Intervention(s) Made  Pharmacologic: Medication Conversion - Non-Preferred to Preferred    Comments: Pending rx to be sent to alternate pharmacy    Tool(s) Used  Other    Time Spent in Direct Patient Care Activities and Care Coordination: 0-15 Minutes    Recommendation(s) Accepted by the Patient/Caregiver:  All Accepted

## 2019-11-05 ENCOUNTER — IMMUNIZATIONS (OUTPATIENT)
Dept: FAMILY MEDICINE CLINIC | Facility: CLINIC | Age: 64
End: 2019-11-05
Payer: COMMERCIAL

## 2019-11-05 DIAGNOSIS — Z23 NEED FOR INFLUENZA VACCINATION: Primary | ICD-10-CM

## 2019-11-05 PROCEDURE — 90682 RIV4 VACC RECOMBINANT DNA IM: CPT

## 2019-11-05 PROCEDURE — G0008 ADMIN INFLUENZA VIRUS VAC: HCPCS

## 2019-11-06 ENCOUNTER — TELEPHONE (OUTPATIENT)
Dept: FAMILY MEDICINE CLINIC | Facility: CLINIC | Age: 64
End: 2019-11-06

## 2019-12-31 ENCOUNTER — APPOINTMENT (OUTPATIENT)
Dept: LAB | Facility: IMAGING CENTER | Age: 64
End: 2019-12-31
Payer: COMMERCIAL

## 2019-12-31 ENCOUNTER — TRANSCRIBE ORDERS (OUTPATIENT)
Dept: ADMINISTRATIVE | Facility: HOSPITAL | Age: 64
End: 2019-12-31

## 2019-12-31 DIAGNOSIS — E11.9 TYPE 2 DIABETES MELLITUS WITHOUT COMPLICATION, WITHOUT LONG-TERM CURRENT USE OF INSULIN (HCC): ICD-10-CM

## 2019-12-31 DIAGNOSIS — I10 ESSENTIAL HYPERTENSION: ICD-10-CM

## 2019-12-31 LAB
ALBUMIN SERPL BCP-MCNC: 3.7 G/DL (ref 3.5–5)
ALP SERPL-CCNC: 66 U/L (ref 46–116)
ALT SERPL W P-5'-P-CCNC: 26 U/L (ref 12–78)
ANION GAP SERPL CALCULATED.3IONS-SCNC: 4 MMOL/L (ref 4–13)
AST SERPL W P-5'-P-CCNC: 17 U/L (ref 5–45)
BILIRUB SERPL-MCNC: 0.58 MG/DL (ref 0.2–1)
BUN SERPL-MCNC: 17 MG/DL (ref 5–25)
CALCIUM SERPL-MCNC: 9.5 MG/DL (ref 8.3–10.1)
CHLORIDE SERPL-SCNC: 108 MMOL/L (ref 100–108)
CO2 SERPL-SCNC: 29 MMOL/L (ref 21–32)
CREAT SERPL-MCNC: 0.67 MG/DL (ref 0.6–1.3)
CREAT UR-MCNC: 71.9 MG/DL
EST. AVERAGE GLUCOSE BLD GHB EST-MCNC: 134 MG/DL
GFR SERPL CREATININE-BSD FRML MDRD: 93 ML/MIN/1.73SQ M
GLUCOSE P FAST SERPL-MCNC: 126 MG/DL (ref 65–99)
HBA1C MFR BLD: 6.3 % (ref 4.2–6.3)
MICROALBUMIN UR-MCNC: 9.5 MG/L (ref 0–20)
MICROALBUMIN/CREAT 24H UR: 13 MG/G CREATININE (ref 0–30)
POTASSIUM SERPL-SCNC: 4.3 MMOL/L (ref 3.5–5.3)
PROT SERPL-MCNC: 7.3 G/DL (ref 6.4–8.2)
SODIUM SERPL-SCNC: 141 MMOL/L (ref 136–145)

## 2019-12-31 PROCEDURE — 80053 COMPREHEN METABOLIC PANEL: CPT

## 2019-12-31 PROCEDURE — 82043 UR ALBUMIN QUANTITATIVE: CPT

## 2019-12-31 PROCEDURE — 36415 COLL VENOUS BLD VENIPUNCTURE: CPT

## 2019-12-31 PROCEDURE — 83036 HEMOGLOBIN GLYCOSYLATED A1C: CPT

## 2019-12-31 PROCEDURE — 82570 ASSAY OF URINE CREATININE: CPT

## 2020-01-06 DIAGNOSIS — M79.7 FIBROMYALGIA: ICD-10-CM

## 2020-01-06 RX ORDER — GABAPENTIN 300 MG/1
CAPSULE ORAL
Qty: 360 CAPSULE | Refills: 3 | Status: SHIPPED | OUTPATIENT
Start: 2020-01-06 | End: 2021-02-24

## 2020-01-10 ENCOUNTER — OFFICE VISIT (OUTPATIENT)
Dept: FAMILY MEDICINE CLINIC | Facility: CLINIC | Age: 65
End: 2020-01-10
Payer: COMMERCIAL

## 2020-01-10 VITALS
SYSTOLIC BLOOD PRESSURE: 138 MMHG | WEIGHT: 271 LBS | TEMPERATURE: 98.9 F | HEIGHT: 64 IN | HEART RATE: 83 BPM | OXYGEN SATURATION: 95 % | DIASTOLIC BLOOD PRESSURE: 86 MMHG | BODY MASS INDEX: 46.26 KG/M2

## 2020-01-10 DIAGNOSIS — E11.65 TYPE 2 DIABETES MELLITUS WITH HYPERGLYCEMIA, WITHOUT LONG-TERM CURRENT USE OF INSULIN (HCC): ICD-10-CM

## 2020-01-10 DIAGNOSIS — M79.7 FIBROMYALGIA: ICD-10-CM

## 2020-01-10 DIAGNOSIS — E66.01 CLASS 3 SEVERE OBESITY DUE TO EXCESS CALORIES WITH SERIOUS COMORBIDITY AND BODY MASS INDEX (BMI) OF 40.0 TO 44.9 IN ADULT (HCC): ICD-10-CM

## 2020-01-10 DIAGNOSIS — Z12.31 SCREENING MAMMOGRAM FOR HIGH-RISK PATIENT: Primary | ICD-10-CM

## 2020-01-10 DIAGNOSIS — E78.00 PURE HYPERCHOLESTEROLEMIA: ICD-10-CM

## 2020-01-10 DIAGNOSIS — B37.9 CANDIDIASIS: ICD-10-CM

## 2020-01-10 DIAGNOSIS — E55.9 VITAMIN D DEFICIENCY: ICD-10-CM

## 2020-01-10 DIAGNOSIS — I10 ESSENTIAL HYPERTENSION: ICD-10-CM

## 2020-01-10 DIAGNOSIS — E11.9 TYPE 2 DIABETES MELLITUS WITHOUT COMPLICATION, WITHOUT LONG-TERM CURRENT USE OF INSULIN (HCC): ICD-10-CM

## 2020-01-10 PROBLEM — R74.8 ABNORMAL LIVER ENZYMES: Status: RESOLVED | Noted: 2017-06-07 | Resolved: 2020-01-10

## 2020-01-10 PROBLEM — F33.1 MAJOR DEPRESSIVE DISORDER, RECURRENT EPISODE, MODERATE (HCC): Status: RESOLVED | Noted: 2017-01-30 | Resolved: 2020-01-10

## 2020-01-10 PROCEDURE — 1101F PT FALLS ASSESS-DOCD LE1/YR: CPT | Performed by: FAMILY MEDICINE

## 2020-01-10 PROCEDURE — G0439 PPPS, SUBSEQ VISIT: HCPCS | Performed by: FAMILY MEDICINE

## 2020-01-10 PROCEDURE — 1036F TOBACCO NON-USER: CPT | Performed by: FAMILY MEDICINE

## 2020-01-10 PROCEDURE — 3288F FALL RISK ASSESSMENT DOCD: CPT | Performed by: FAMILY MEDICINE

## 2020-01-10 PROCEDURE — 3008F BODY MASS INDEX DOCD: CPT | Performed by: FAMILY MEDICINE

## 2020-01-10 PROCEDURE — 3075F SYST BP GE 130 - 139MM HG: CPT | Performed by: FAMILY MEDICINE

## 2020-01-10 PROCEDURE — 3725F SCREEN DEPRESSION PERFORMED: CPT | Performed by: FAMILY MEDICINE

## 2020-01-10 PROCEDURE — 99214 OFFICE O/P EST MOD 30 MIN: CPT | Performed by: FAMILY MEDICINE

## 2020-01-10 PROCEDURE — 3079F DIAST BP 80-89 MM HG: CPT | Performed by: FAMILY MEDICINE

## 2020-01-10 RX ORDER — NYSTATIN 100000 [USP'U]/G
1 POWDER TOPICAL 2 TIMES DAILY
Qty: 60 G | Refills: 5 | Status: SHIPPED | OUTPATIENT
Start: 2020-01-10 | End: 2021-04-30

## 2020-01-10 NOTE — PROGRESS NOTES
Assessment/Plan:       Problem List Items Addressed This Visit        Endocrine    Type 2 diabetes mellitus with hyperglycemia, without long-term current use of insulin ([facility] [address] )       Cardiovascular and Mediastinum    Essential hypertension    Relevant Orders    Comprehensive metabolic panel    CBC and differential       Other    Fibromyalgia    Pure hypercholesterolemia    Class 3 severe obesity due to excess calories with serious comorbidity and body mass index (BMI) of 40 0 to 44 9 in adult ([facility] [address] )    Relevant Orders    TSH, 3rd generation with Free T4 reflex    Vitamin D deficiency    Relevant Orders    Vitamin D 25 hydroxy      Other Visit Diagnoses     Screening mammogram for high-risk patient    -  Primary    Candidiasis        Relevant Medications    nystatin (MYCOSTATIN) powder            Subjective:      Patient ID: Teja Lorenzo is a 59 y o  female  HPI   The patient presents today for follow-up for diabetes  Fortunately, the A1c is well controlled  There have been no significant episodes of hypo or hyperglycemia  The patient is not experiencing any blurry vision, polyuria, polydipsia, or peripheral neuropathy symptoms  Patient remains compliant with medications and routine follow-up  She is obese and has been trying to lose weight but is not necessarily able to do that this time  She only can exercise minimally due to significant issues with bilateral ankle problems and some chronic leg pain  The pain did improve with physical therapy but she has not been doing her exercises  The patient presents today for a hypertension follow-up  Patient remains compliant with medications and denies any chest pain, shortness of breath, palpitations, lightheadedness or diaphoresis  She has some mild situational depressed mood on occasion  She feels this is stable  She just wishes she could do more but her legs prevent her from doing that  She has some fibromyalgia    She feels she is pretty stable with just taking gabapentin 1 in the morning and 2 at bedtime  The following portions of the patient's history were reviewed and updated as appropriate: allergies, current medications, past family history, past medical history, past social history, past surgical history and problem list       Current Outpatient Medications:     Cholecalciferol (VITAMIN D) 2000 units tablet, Take 2,000 Units by mouth daily, Disp: , Rfl:     FARXIGA 5 MG TABS, TAKE 1 TABLET EVERY MORNING, Disp: 90 tablet, Rfl: 3    fexofenadine (ALLEGRA) 180 MG tablet, Take 180 mg by mouth daily, Disp: , Rfl:     fluticasone (FLONASE) 50 mcg/act nasal spray, 1 spray into each nostril daily, Disp: , Rfl:     gabapentin (NEURONTIN) 300 mg capsule, TAKE 1 CAPSULE IN THE MORNING, 1 CAPSULE AT LUNCH AND 2 CAPSULES AT BEDTIME (Patient taking differently: No sig reported), Disp: 360 capsule, Rfl: 3    metFORMIN (GLUCOPHAGE-XR) 500 mg 24 hr tablet, Take 2 tablets (1,000 mg total) by mouth daily For diabetes - note tablet strength change, Disp: 180 tablet, Rfl: 2    naproxen sodium (ALEVE) 220 MG tablet, Take 2 tabs every morning  Take 2 tabs in the evening as needed  , Disp: , Rfl:     nystatin (MYCOSTATIN) powder, Apply 1 application topically 2 (two) times a day As directed, Disp: 60 g, Rfl: 5    quinapril (ACCUPRIL) 40 MG tablet, TAKE 1 TABLET DAILY, Disp: 90 tablet, Rfl: 3    simvastatin (ZOCOR) 40 mg tablet, TAKE 1 TABLET DAILY, Disp: 90 tablet, Rfl: 3    traZODone (DESYREL) 50 mg tablet, TAKE 1 TABLET BY MOUTH DAILY AT BEDTIME AS NEEDED FOR SLEEP, Disp: 90 tablet, Rfl: 3     Review of Systems   Constitutional: Positive for fatigue  Negative for appetite change, chills, fever and unexpected weight change  HENT: Negative for trouble swallowing  Eyes: Negative for visual disturbance  Respiratory: Negative for cough, chest tightness, shortness of breath and wheezing  Cardiovascular: Negative for chest pain, palpitations and leg swelling  Gastrointestinal: Negative for abdominal distention, abdominal pain, blood in stool, constipation and diarrhea  Endocrine: Negative for polyuria  Genitourinary: Negative for difficulty urinating and flank pain  Musculoskeletal: Positive for arthralgias and gait problem  Negative for myalgias  Skin: Negative for rash  Neurological: Negative for dizziness and light-headedness  Hematological: Negative for adenopathy  Does not bruise/bleed easily  Psychiatric/Behavioral: Negative for sleep disturbance  Objective:      /86 (BP Location: Left arm, Patient Position: Sitting, Cuff Size: Standard)   Pulse 83   Temp 98 9 °F (37 2 °C) (Tympanic)   Ht 5' 3 5" (1 613 m)   Wt 123 kg (271 lb)   SpO2 95%   BMI 47 25 kg/m²          Physical Exam   Constitutional: She is oriented to person, place, and time  She appears well-developed and well-nourished  No distress  HENT:   Head: Normocephalic  Eyes: Pupils are equal, round, and reactive to light  Right eye exhibits no discharge  Left eye exhibits no discharge  Neck: No tracheal deviation present  No thyromegaly present  Cardiovascular: Normal rate, regular rhythm and normal heart sounds  No murmur heard  Pulmonary/Chest: Effort normal  No respiratory distress  She has no wheezes  She has no rales  Abdominal: Soft  She exhibits no distension  There is no tenderness  Musculoskeletal: Normal range of motion  She exhibits no edema  Lymphadenopathy:     She has no cervical adenopathy  Neurological: She is alert and oriented to person, place, and time  No cranial nerve deficit  Skin: Skin is warm  She is not diaphoretic  No erythema  Psychiatric: She has a normal mood and affect   Judgment and thought content normal          Swathi Abernathy MD

## 2020-01-10 NOTE — PATIENT INSTRUCTIONS
Medicare Preventive Visit Patient Instructions  Thank you for completing your Welcome to Medicare Visit or Medicare Annual Wellness Visit today  Your next wellness visit will be due in one year (1/10/2021)  The screening/preventive services that you may require over the next 5-10 years are detailed below  Some tests may not apply to you based off risk factors and/or age  Screening tests ordered at today's visit but not completed yet may show as past due  Also, please note that scanned in results may not display below  Preventive Screenings:  Service Recommendations Previous Testing/Comments   Colorectal Cancer Screening  * Colonoscopy    * Fecal Occult Blood Test (FOBT)/Fecal Immunochemical Test (FIT)  * Fecal DNA/Cologuard Test  * Flexible Sigmoidoscopy Age: 54-65 years old   Colonoscopy: every 10 years (may be performed more frequently if at higher risk)  OR  FOBT/FIT: every 1 year  OR  Cologuard: every 3 years  OR  Sigmoidoscopy: every 5 years  Screening may be recommended earlier than age 48 if at higher risk for colorectal cancer  Also, an individualized decision between you and your healthcare provider will decide whether screening between the ages of 74-80 would be appropriate  Colonoscopy: 08/03/2017  FOBT/FIT: Not on file  Cologuard: Not on file  Sigmoidoscopy: Not on file    Screening Current     Breast Cancer Screening Age: 36 years old  Frequency: every 1-2 years  Not required if history of left and right mastectomy Mammogram: 05/21/2019    Screening Current   Cervical Cancer Screening Between the ages of 21-29, pap smear recommended once every 3 years  Between the ages of 33-67, can perform pap smear with HPV co-testing every 5 years     Recommendations may differ for women with a history of total hysterectomy, cervical cancer, or abnormal pap smears in past  Pap Smear: Not on file       Hepatitis C Screening Once for adults born between 1945 and 1965  More frequently in patients at high risk for Hepatitis C Hep C Antibody: 12/11/2017    Screening Current   Diabetes Screening 1-2 times per year if you're at risk for diabetes or have pre-diabetes Fasting glucose: 126 mg/dL   A1C: 6 3 %    Screening Not Indicated  History Diabetes   Cholesterol Screening Once every 5 years if you don't have a lipid disorder  May order more often based on risk factors  Lipid panel: 09/11/2018    Screening Not Indicated  History Lipid Disorder     Other Preventive Screenings Covered by Medicare:  1  Abdominal Aortic Aneurysm (AAA) Screening: covered once if your at risk  You're considered to be at risk if you have a family history of AAA  2  Lung Cancer Screening: covers low dose CT scan once per year if you meet all of the following conditions: (1) Age 50-69; (2) No signs or symptoms of lung cancer; (3) Current smoker or have quit smoking within the last 15 years; (4) You have a tobacco smoking history of at least 30 pack years (packs per day multiplied by number of years you smoked); (5) You get a written order from a healthcare provider  3  Glaucoma Screening: covered annually if you're considered high risk: (1) You have diabetes OR (2) Family history of glaucoma OR (3)  aged 48 and older OR (3)  American aged 72 and older  3  Osteoporosis Screening: covered every 2 years if you meet one of the following conditions: (1) You're estrogen deficient and at risk for osteoporosis based off medical history and other findings; (2) Have a vertebral abnormality; (3) On glucocorticoid therapy for more than 3 months; (4) Have primary hyperparathyroidism; (5) On osteoporosis medications and need to assess response to drug therapy  · Last bone density test (DXA Scan): 09/26/2017  5  HIV Screening: covered annually if you're between the age of 12-76  Also covered annually if you are younger than 13 and older than 72 with risk factors for HIV infection   For pregnant patients, it is covered up to 3 times per pregnancy  Immunizations:  Immunization Recommendations   Influenza Vaccine Annual influenza vaccination during flu season is recommended for all persons aged >= 6 months who do not have contraindications   Pneumococcal Vaccine (Prevnar and Pneumovax)  * Prevnar = PCV13  * Pneumovax = PPSV23   Adults 25-60 years old: 1-3 doses may be recommended based on certain risk factors  Adults 72 years old: Prevnar (PCV13) vaccine recommended followed by Pneumovax (PPSV23) vaccine  If already received PPSV23 since turning 65, then PCV13 recommended at least one year after PPSV23 dose  Hepatitis B Vaccine 3 dose series if at intermediate or high risk (ex: diabetes, end stage renal disease, liver disease)   Tetanus (Td) Vaccine - COST NOT COVERED BY MEDICARE PART B Following completion of primary series, a booster dose should be given every 10 years to maintain immunity against tetanus  Td may also be given as tetanus wound prophylaxis  Tdap Vaccine - COST NOT COVERED BY MEDICARE PART B Recommended at least once for all adults  For pregnant patients, recommended with each pregnancy  Shingles Vaccine (Shingrix) - COST NOT COVERED BY MEDICARE PART B  2 shot series recommended in those aged 48 and above     Health Maintenance Due:      Topic Date Due    DXA SCAN  09/26/2019    Cervical Cancer Screening  02/10/2020 (Originally 5/6/1976)    MAMMOGRAM  05/21/2020    CRC Screening: Colonoscopy  08/03/2022    Hepatitis C Screening  Completed     Immunizations Due:      Topic Date Due    DTaP,Tdap,and Td Vaccines (1 - Tdap) 05/06/1966    Hepatitis B Vaccine (1 of 3 - Risk 3-dose series) 05/06/1974     Advance Directives   What are advance directives? Advance directives are legal documents that state your wishes and plans for medical care  These plans are made ahead of time in case you lose your ability to make decisions for yourself   Advance directives can apply to any medical decision, such as the treatments you want, and if you want to donate organs  What are the types of advance directives? There are many types of advance directives, and each state has rules about how to use them  You may choose a combination of any of the following:  · Living will: This is a written record of the treatment you want  You can also choose which treatments you do not want, which to limit, and which to stop at a certain time  This includes surgery, medicine, IV fluid, and tube feedings  · Durable power of  for healthcare [facility]): This is a written record that states who you want to make healthcare choices for you when you are unable to make them for yourself  This person, called a proxy, is usually a family member or a friend  You may choose more than 1 proxy  · Do not resuscitate (DNR) order:  A DNR order is used in case your heart stops beating or you stop breathing  It is a request not to have certain forms of treatment, such as CPR  A DNR order may be included in other types of advance directives  · Medical directive: This covers the care that you want if you are in a coma, near death, or unable to make decisions for yourself  You can list the treatments you want for each condition  Treatment may include pain medicine, surgery, blood transfusions, dialysis, IV or tube feedings, and a ventilator (breathing machine)  · Values history: This document has questions about your views, beliefs, and how you feel and think about life  This information can help others choose the care that you would choose  Why are advance directives important? An advance directive helps you control your care  Although spoken wishes may be used, it is better to have your wishes written down  Spoken wishes can be misunderstood, or not followed  Treatments may be given even if you do not want them  An advance directive may make it easier for your family to make difficult choices about your care     Weight Management   Why it is important to manage your weight:  Being overweight increases your risk of health conditions such as heart disease, high blood pressure, type 2 diabetes, and certain types of cancer  It can also increase your risk for osteoarthritis, sleep apnea, and other respiratory problems  Aim for a slow, steady weight loss  Even a small amount of weight loss can lower your risk of health problems  How to lose weight safely:  A safe and healthy way to lose weight is to eat fewer calories and get regular exercise  You can lose up about 1 pound a week by decreasing the number of calories you eat by 500 calories each day  Healthy meal plan for weight management:  A healthy meal plan includes a variety of foods, contains fewer calories, and helps you stay healthy  A healthy meal plan includes the following:  · Eat whole-grain foods more often  A healthy meal plan should contain fiber  Fiber is the part of grains, fruits, and vegetables that is not broken down by your body  Whole-grain foods are healthy and provide extra fiber in your diet  Some examples of whole-grain foods are whole-wheat breads and pastas, oatmeal, brown rice, and bulgur  · Eat a variety of vegetables every day  Include dark, leafy greens such as spinach, kale, ant greens, and mustard greens  Eat yellow and orange vegetables such as carrots, sweet potatoes, and winter squash  · Eat a variety of fruits every day  Choose fresh or canned fruit (canned in its own juice or light syrup) instead of juice  Fruit juice has very little or no fiber  · Eat low-fat dairy foods  Drink fat-free (skim) milk or 1% milk  Eat fat-free yogurt and low-fat cottage cheese  Try low-fat cheeses such as mozzarella and other reduced-fat cheeses  · Choose meat and other protein foods that are low in fat  Choose beans or other legumes such as split peas or lentils  Choose fish, skinless poultry (chicken or turkey), or lean cuts of red meat (beef or pork)   Before you cook meat or poultry, cut off any visible fat  · Use less fat and oil  Try baking foods instead of frying them  Add less fat, such as margarine, sour cream, regular salad dressing and mayonnaise to foods  Eat fewer high-fat foods  Some examples of high-fat foods include french fries, doughnuts, ice cream, and cakes  · Eat fewer sweets  Limit foods and drinks that are high in sugar  This includes candy, cookies, regular soda, and sweetened drinks  Exercise:  Exercise at least 30 minutes per day on most days of the week  Some examples of exercise include walking, biking, dancing, and swimming  You can also fit in more physical activity by taking the stairs instead of the elevator or parking farther away from stores  Ask your healthcare provider about the best exercise plan for you  © Copyright [employer] 2018 Information is for End User's use only and may not be sold, redistributed or otherwise used for commercial purposes   All illustrations and images included in CareNotes® are the copyrighted property of [employer]  or [address] [place] [address]

## 2020-01-10 NOTE — PROGRESS NOTES
Assessment and Plan:     Problem List Items Addressed This Visit     None      Visit Diagnoses     Screening mammogram for high-risk patient    -  Primary    Candidiasis            BMI Counseling: Body mass index is 47 25 kg/m²  The BMI is above normal  Nutrition recommendations include encouraging healthy choices of fruits and vegetables  Exercise recommendations include exercising 3-5 times per week  Patient presents today for Medicare wellness visit  Overall, her health is relatively stable  She unfortunately deals with chronic pain of her lower extremities  Physical therapy was helpful and I did encourage her to continue with the exercises they prescribed  She is up-to-date on mammogram as well as colon cancer screening  DEXA is up-to-date  She is able to complete her own activities of daily life and has had no falls  She screens negative for depression  She has had no cognitive decline  Preventive health issues were discussed with patient, and age appropriate screening tests were ordered as noted in patient's After Visit Summary  Personalized health advice and appropriate referrals for health education or preventive services given if needed, as noted in patient's After Visit Summary       History of Present Illness:     Patient presents for Medicare Annual Wellness visit    Patient Care Team:  Mariana Butler MD as PCP - General  MD Carol Ann Del Valle MD (Urogynecology)  Raven Ron DPM (Podiatry)  Batool Chiu MD as [address] [place] [address] (Ophthalmology)     Problem List:     Patient Active Problem List   Diagnosis    Abnormal liver enzymes    Type 2 diabetes mellitus without complication [facility])    Essential tremor    Fibromyalgia    Neurologic gait dysfunction    Pure hypercholesterolemia    Essential hypertension    Irritable bowel syndrome    Lower back pain    Major depressive disorder, recurrent episode, moderate (HCC)    Class 3 severe obesity due to excess calories with serious comorbidity and body mass index (BMI) of 40 0 to 44 9 in adult ([facility] [address] )    Rosacea    Urinary incontinence    Vitamin D deficiency    Congenital valgus deformity of foot    Primary localized osteoarthrosis of ankle and foot    Osteoarthritis of hip    Osteoarthritis of knee    Tibialis tendinitis    History of total hip replacement    Insomnia due to medical condition      Past Medical and Surgical History:     Past Medical History:   Diagnosis Date    Anemia     last assessed: 2015    Dysfunction of eustachian tube     unspecified laterality; last assessed: 2013    Endometriosis     Fibromyalgia     Hyperlipidemia     IBS (irritable bowel syndrome)     Vitamin D deficiency      Past Surgical History:   Procedure Laterality Date    ANKLE SURGERY      , , -extensive surgery     SECTION      x2    JOINT REPLACEMENT      hip    LUMBAR EPIDURAL INJECTION  12/2005    x 1    WI COLONOSCOPY FLX DX W/COLLJ SPEC WHEN PFRMD N/A 8/3/2017    Procedure: COLONOSCOPY with polypectomy;  Surgeon: Erin Abraham MD;  Location: [facility];   Service: Gastroenterology    TOTAL HIP ARTHROPLASTY Left 2013      Family History:     Family History   Problem Relation Age of Onset    Other Mother         CABG    Other Father         gangrene    No Known Problems Sister     No Known Problems Daughter     No Known Problems Maternal Aunt     No Known Problems Maternal Aunt     No Known Problems Maternal Aunt     No Known Problems Maternal Aunt       Social History:     Social History     Socioeconomic History    Marital status: /Civil Union     Spouse name: None    Number of children: None    Years of education: None    Highest education level: None   Occupational History    None   Social Needs    Financial resource strain: None    Food insecurity:     Worry: None     Inability: None    Transportation needs:     Medical: None Non-medical: None   Tobacco Use    Smoking status: Never Smoker    Smokeless tobacco: Never Used   Substance and Sexual Activity    Alcohol use: Yes     Frequency: Monthly or less     Comment: socail;  5 drinks weekly (as per [employer])    Drug use: No    Sexual activity: Yes     Partners: Male   Lifestyle    Physical activity:     Days per week: None     Minutes per session: None    Stress: None   Relationships    Social connections:     Talks on phone: None     Gets together: None     Attends [employer] service: None     Active member of club or organization: None     Attends meetings of clubs or organizations: None     Relationship status: None    Intimate partner violence:     Fear of current or ex partner: None     Emotionally abused: None     Physically abused: None     Forced sexual activity: None   Other Topics Concern    None   Social History Narrative    None       Medications and Allergies:     Current Outpatient Medications   Medication Sig Dispense Refill    Cholecalciferol (VITAMIN D) 2000 units tablet Take 2,000 Units by mouth daily      FARXIGA 5 MG TABS TAKE 1 TABLET EVERY MORNING 90 tablet 3    fexofenadine (ALLEGRA) 180 MG tablet Take 180 mg by mouth daily      fluticasone (FLONASE) 50 mcg/act nasal spray 1 spray into each nostril daily      gabapentin (NEURONTIN) 300 mg capsule TAKE 1 CAPSULE IN THE MORNING, 1 CAPSULE AT LUNCH AND 2 CAPSULES AT BEDTIME (Patient taking differently: No sig reported) 360 capsule 3    metFORMIN (GLUCOPHAGE-XR) 500 mg 24 hr tablet Take 2 tablets (1,000 mg total) by mouth daily For diabetes - note tablet strength change 180 tablet 2    naproxen sodium (ALEVE) 220 MG tablet Take 2 tabs every morning  Take 2 tabs in the evening as needed   nystatin (MYCOSTATIN) powder APPLY TO AFFECTED AREA TWICE A DAY AS DIRECTED   60 g 0    quinapril (ACCUPRIL) 40 MG tablet TAKE 1 TABLET DAILY 90 tablet 3    simvastatin (ZOCOR) 40 mg tablet TAKE 1 TABLET DAILY 90 tablet 3    traZODone (DESYREL) 50 mg tablet TAKE 1 TABLET BY MOUTH DAILY AT BEDTIME AS NEEDED FOR SLEEP 90 tablet 3     No current facility-administered medications for this visit  Allergies   Allergen Reactions    Augmentin [Amoxicillin-Pot Clavulanate] Nausea Only     Category: nausea,diarrhea;     Azithromycin Nausea Only    Doxycycline GI Intolerance    Erythromycin GI Intolerance    Lodine [Etodolac]      Category: rash;     Lorazepam      [facility] - [id number]: mental status changes    Sulfa Antibiotics      Category: rash;       Immunizations:     Immunization History   Administered Date(s) Administered    INFLUENZA 10/14/2009, 09/23/2015, 01/05/2017, 12/20/2017    Influenza Quadrivalent Preservative Free 3 years and older IM 12/23/2014, 01/05/2017    Influenza TIV (IM) 09/30/2011, 10/09/2012    Influenza, recombinant, quadrivalent,injectable, preservative free 09/14/2018, 11/05/2019    Pneumococcal Polysaccharide PPV23 12/15/2010    Td (adult), adsorbed 05/10/2002      Health Maintenance:         Topic Date Due    DXA SCAN  09/26/2019    Cervical Cancer Screening  02/10/2020 (Originally 5/6/1976)    MAMMOGRAM  05/21/2020    CRC Screening: Colonoscopy  08/03/2022    Hepatitis C Screening  Completed         Topic Date Due    DTaP,Tdap,and Td Vaccines (1 - Tdap) 05/06/1966    Hepatitis B Vaccine (1 of 3 - Risk 3-dose series) 05/06/1974      Medicare Health Risk Assessment:     /84 (BP Location: Left arm, Patient Position: Sitting, Cuff Size: Standard)   Pulse 83   Temp 98 9 °F (37 2 °C) (Tympanic)   Ht 5' 3 5" (1 613 m)   Wt 123 kg (271 lb)   SpO2 95%   BMI 47 25 kg/m²      Lissett Slaughter is here for her Subsequent Wellness visit  Health Risk Assessment:   Patient rates overall health as fair  Patient feels that their physical health rating is same  Eyesight was rated as same  Hearing was rated as same  Patient feels that their emotional and mental health rating is same  Pain experienced in the last 7 days has been some  Patient's pain rating has been 5/10  Patient states that she has experienced no weight loss or gain in last 6 months  Chronic pain from ankles and lower extremities  She also has fibromyalgia    Depression Screening:   PHQ-2 Score: 1  PHQ-9 Score: 2      Fall Risk Screening: In the past year, patient has experienced: no history of falling in past year      Urinary Incontinence Screening:   Patient has not leaked urine accidently in the last six months  Home Safety:  Patient does not have trouble with stairs inside or outside of their home  Patient has working smoke alarms and has working carbon monoxide detector  Home safety hazards include: none  Nutrition:   Current diet is Diabetic, Unhealthy and Limited junk food  Medications:   Patient is currently taking over-the-counter supplements  OTC medications include: see medication list  Patient is able to manage medications  Activities of Daily Living (ADLs)/Instrumental Activities of Daily Living (IADLs):   Walk and transfer into and out of bed and chair?: Yes  Dress and groom yourself?: Yes    Bathe or shower yourself?: Yes    Feed yourself?  Yes  Do your laundry/housekeeping?: Yes  Manage your money, pay your bills and track your expenses?: Yes  Make your own meals?: Yes    Do your own shopping?: Yes    Previous Hospitalizations:   Any hospitalizations or ED visits within the last 12 months?: No      Advance Care Planning:   Living will: Yes    Advanced directive: Yes    End of Life Decisions reviewed with patient: Yes      Comments: I did ask her to get us a copy of her living will    Cognitive Screening:   Provider or family/friend/caregiver concerned regarding cognition?: No    PREVENTIVE SCREENINGS      Cardiovascular Screening:    General: Screening Not Indicated and History Lipid Disorder      Diabetes Screening:     General: Screening Not Indicated and History Diabetes      Colorectal Cancer Screening:     General: Screening Current      Breast Cancer Screening:     General: Screening Current      Cervical Cancer Screening:    General: Screening Not Indicated      Osteoporosis Screening:    General: Screening Current      Abdominal Aortic Aneurysm (AAA) Screening:        General: Screening Not Indicated      Lung Cancer Screening:     General: Screening Not Indicated      Hepatitis C Screening:    General: Screening Current      Zuleika Winston MD

## 2020-03-02 DIAGNOSIS — E11.9 TYPE 2 DIABETES MELLITUS WITHOUT COMPLICATION, WITHOUT LONG-TERM CURRENT USE OF INSULIN (HCC): ICD-10-CM

## 2020-03-02 RX ORDER — DAPAGLIFLOZIN 5 MG/1
TABLET, FILM COATED ORAL
Qty: 90 TABLET | Refills: 4 | Status: SHIPPED | OUTPATIENT
Start: 2020-03-02 | End: 2021-04-19

## 2020-04-08 DIAGNOSIS — G47.01 INSOMNIA DUE TO MEDICAL CONDITION: ICD-10-CM

## 2020-04-08 RX ORDER — TRAZODONE HYDROCHLORIDE 50 MG/1
50 TABLET ORAL
Qty: 90 TABLET | Refills: 3 | Status: SHIPPED | OUTPATIENT
Start: 2020-04-08 | End: 2021-04-19

## 2020-05-11 ENCOUNTER — TELEPHONE (OUTPATIENT)
Dept: FAMILY MEDICINE CLINIC | Facility: CLINIC | Age: 65
End: 2020-05-11

## 2020-05-11 DIAGNOSIS — Z12.39 SCREENING FOR BREAST CANCER: Primary | ICD-10-CM

## 2020-05-26 ENCOUNTER — HOSPITAL ENCOUNTER (OUTPATIENT)
Dept: RADIOLOGY | Facility: IMAGING CENTER | Age: 65
Discharge: HOME | End: 2020-05-26
Payer: COMMERCIAL

## 2020-05-26 ENCOUNTER — TELEPHONE (OUTPATIENT)
Dept: FAMILY MEDICINE CLINIC | Facility: CLINIC | Age: 65
End: 2020-05-26

## 2020-05-26 VITALS — HEIGHT: 65 IN | BODY MASS INDEX: 47.15 KG/M2 | WEIGHT: 283 LBS

## 2020-05-26 DIAGNOSIS — Z12.39 SCREENING FOR BREAST CANCER: ICD-10-CM

## 2020-05-26 PROCEDURE — 77067 SCR MAMMO BI INCL CAD: CPT

## 2020-05-27 DIAGNOSIS — E78.00 HYPERCHOLESTEREMIA: ICD-10-CM

## 2020-05-27 DIAGNOSIS — I10 ESSENTIAL HYPERTENSION: ICD-10-CM

## 2020-05-27 PROCEDURE — 4010F ACE/ARB THERAPY RXD/TAKEN: CPT | Performed by: FAMILY MEDICINE

## 2020-05-27 RX ORDER — QUINAPRIL 40 MG/1
TABLET ORAL
Qty: 90 TABLET | Refills: 3 | Status: SHIPPED | OUTPATIENT
Start: 2020-05-27 | End: 2021-04-20 | Stop reason: SDUPTHER

## 2020-05-27 RX ORDER — SIMVASTATIN 40 MG
TABLET ORAL
Qty: 90 TABLET | Refills: 3 | Status: SHIPPED | OUTPATIENT
Start: 2020-05-27 | End: 2021-01-13

## 2020-07-06 DIAGNOSIS — E11.9 TYPE 2 DIABETES MELLITUS WITHOUT COMPLICATION, WITHOUT LONG-TERM CURRENT USE OF INSULIN (HCC): ICD-10-CM

## 2020-07-06 RX ORDER — METFORMIN HYDROCHLORIDE 500 MG/1
TABLET, EXTENDED RELEASE ORAL
Qty: 180 TABLET | Refills: 3 | Status: SHIPPED | OUTPATIENT
Start: 2020-07-06 | End: 2020-07-13 | Stop reason: SDUPTHER

## 2020-07-07 ENCOUNTER — TRANSCRIBE ORDERS (OUTPATIENT)
Dept: ADMINISTRATIVE | Facility: HOSPITAL | Age: 65
End: 2020-07-07

## 2020-07-07 ENCOUNTER — APPOINTMENT (OUTPATIENT)
Dept: LAB | Facility: IMAGING CENTER | Age: 65
End: 2020-07-07
Payer: COMMERCIAL

## 2020-07-07 DIAGNOSIS — E66.01 CLASS 3 SEVERE OBESITY DUE TO EXCESS CALORIES WITH SERIOUS COMORBIDITY AND BODY MASS INDEX (BMI) OF 40.0 TO 44.9 IN ADULT (HCC): ICD-10-CM

## 2020-07-07 DIAGNOSIS — E55.9 VITAMIN D DEFICIENCY: ICD-10-CM

## 2020-07-07 DIAGNOSIS — I10 ESSENTIAL HYPERTENSION: ICD-10-CM

## 2020-07-07 DIAGNOSIS — E11.9 TYPE 2 DIABETES MELLITUS WITHOUT COMPLICATION, WITHOUT LONG-TERM CURRENT USE OF INSULIN (HCC): ICD-10-CM

## 2020-07-07 LAB
25(OH)D3 SERPL-MCNC: 37.8 NG/ML (ref 30–100)
ALBUMIN SERPL BCP-MCNC: 3.9 G/DL (ref 3.5–5)
ALP SERPL-CCNC: 61 U/L (ref 46–116)
ALT SERPL W P-5'-P-CCNC: 23 U/L (ref 12–78)
ANION GAP SERPL CALCULATED.3IONS-SCNC: 6 MMOL/L (ref 4–13)
AST SERPL W P-5'-P-CCNC: 18 U/L (ref 5–45)
BASOPHILS # BLD AUTO: 0.07 THOUSANDS/ΜL (ref 0–0.1)
BASOPHILS NFR BLD AUTO: 1 % (ref 0–1)
BILIRUB SERPL-MCNC: 0.53 MG/DL (ref 0.2–1)
BUN SERPL-MCNC: 15 MG/DL (ref 5–25)
CALCIUM SERPL-MCNC: 9.4 MG/DL (ref 8.3–10.1)
CHLORIDE SERPL-SCNC: 105 MMOL/L (ref 100–108)
CO2 SERPL-SCNC: 27 MMOL/L (ref 21–32)
CREAT SERPL-MCNC: 0.63 MG/DL (ref 0.6–1.3)
EOSINOPHIL # BLD AUTO: 0.27 THOUSAND/ΜL (ref 0–0.61)
EOSINOPHIL NFR BLD AUTO: 4 % (ref 0–6)
ERYTHROCYTE [DISTWIDTH] IN BLOOD BY AUTOMATED COUNT: 13.3 % (ref 11.6–15.1)
EST. AVERAGE GLUCOSE BLD GHB EST-MCNC: 126 MG/DL
GFR SERPL CREATININE-BSD FRML MDRD: 94 ML/MIN/1.73SQ M
GLUCOSE P FAST SERPL-MCNC: 134 MG/DL (ref 65–99)
HBA1C MFR BLD: 6 %
HCT VFR BLD AUTO: 46.2 % (ref 34.8–46.1)
HGB BLD-MCNC: 14.8 G/DL (ref 11.5–15.4)
IMM GRANULOCYTES # BLD AUTO: 0.02 THOUSAND/UL (ref 0–0.2)
IMM GRANULOCYTES NFR BLD AUTO: 0 % (ref 0–2)
LYMPHOCYTES # BLD AUTO: 2.25 THOUSANDS/ΜL (ref 0.6–4.47)
LYMPHOCYTES NFR BLD AUTO: 33 % (ref 14–44)
MCH RBC QN AUTO: 29.9 PG (ref 26.8–34.3)
MCHC RBC AUTO-ENTMCNC: 32 G/DL (ref 31.4–37.4)
MCV RBC AUTO: 93 FL (ref 82–98)
MONOCYTES # BLD AUTO: 0.52 THOUSAND/ΜL (ref 0.17–1.22)
MONOCYTES NFR BLD AUTO: 8 % (ref 4–12)
NEUTROPHILS # BLD AUTO: 3.76 THOUSANDS/ΜL (ref 1.85–7.62)
NEUTS SEG NFR BLD AUTO: 54 % (ref 43–75)
NRBC BLD AUTO-RTO: 0 /100 WBCS
PLATELET # BLD AUTO: 269 THOUSANDS/UL (ref 149–390)
PMV BLD AUTO: 11.5 FL (ref 8.9–12.7)
POTASSIUM SERPL-SCNC: 4 MMOL/L (ref 3.5–5.3)
PROT SERPL-MCNC: 7.4 G/DL (ref 6.4–8.2)
RBC # BLD AUTO: 4.95 MILLION/UL (ref 3.81–5.12)
SODIUM SERPL-SCNC: 138 MMOL/L (ref 136–145)
TSH SERPL DL<=0.05 MIU/L-ACNC: 2.82 UIU/ML (ref 0.36–3.74)
WBC # BLD AUTO: 6.89 THOUSAND/UL (ref 4.31–10.16)

## 2020-07-07 PROCEDURE — 83036 HEMOGLOBIN GLYCOSYLATED A1C: CPT

## 2020-07-07 PROCEDURE — 85025 COMPLETE CBC W/AUTO DIFF WBC: CPT

## 2020-07-07 PROCEDURE — 82306 VITAMIN D 25 HYDROXY: CPT

## 2020-07-07 PROCEDURE — 80053 COMPREHEN METABOLIC PANEL: CPT

## 2020-07-07 PROCEDURE — 3044F HG A1C LEVEL LT 7.0%: CPT | Performed by: FAMILY MEDICINE

## 2020-07-07 PROCEDURE — 84443 ASSAY THYROID STIM HORMONE: CPT

## 2020-07-07 PROCEDURE — 36415 COLL VENOUS BLD VENIPUNCTURE: CPT

## 2020-07-13 ENCOUNTER — TELEPHONE (OUTPATIENT)
Dept: ADMINISTRATIVE | Facility: OTHER | Age: 65
End: 2020-07-13

## 2020-07-13 ENCOUNTER — OFFICE VISIT (OUTPATIENT)
Dept: FAMILY MEDICINE CLINIC | Facility: CLINIC | Age: 65
End: 2020-07-13
Payer: COMMERCIAL

## 2020-07-13 VITALS
HEIGHT: 65 IN | HEART RATE: 104 BPM | DIASTOLIC BLOOD PRESSURE: 80 MMHG | WEIGHT: 293 LBS | BODY MASS INDEX: 48.82 KG/M2 | TEMPERATURE: 97.8 F | OXYGEN SATURATION: 95 % | RESPIRATION RATE: 18 BRPM | SYSTOLIC BLOOD PRESSURE: 158 MMHG

## 2020-07-13 DIAGNOSIS — M19.079 LOCALIZED, PRIMARY OSTEOARTHRITIS OF ANKLE OR FOOT, UNSPECIFIED LATERALITY: ICD-10-CM

## 2020-07-13 DIAGNOSIS — E55.9 VITAMIN D DEFICIENCY: ICD-10-CM

## 2020-07-13 DIAGNOSIS — E11.65 TYPE 2 DIABETES MELLITUS WITH HYPERGLYCEMIA, WITHOUT LONG-TERM CURRENT USE OF INSULIN (HCC): ICD-10-CM

## 2020-07-13 DIAGNOSIS — E78.00 PURE HYPERCHOLESTEROLEMIA: ICD-10-CM

## 2020-07-13 DIAGNOSIS — M17.11 PRIMARY OSTEOARTHRITIS OF RIGHT KNEE: ICD-10-CM

## 2020-07-13 DIAGNOSIS — E66.01 CLASS 3 SEVERE OBESITY DUE TO EXCESS CALORIES WITH SERIOUS COMORBIDITY AND BODY MASS INDEX (BMI) OF 45.0 TO 49.9 IN ADULT (HCC): ICD-10-CM

## 2020-07-13 DIAGNOSIS — E11.9 TYPE 2 DIABETES MELLITUS WITHOUT COMPLICATION, WITHOUT LONG-TERM CURRENT USE OF INSULIN (HCC): ICD-10-CM

## 2020-07-13 DIAGNOSIS — G47.01 INSOMNIA DUE TO MEDICAL CONDITION: ICD-10-CM

## 2020-07-13 DIAGNOSIS — I10 ESSENTIAL HYPERTENSION: Primary | ICD-10-CM

## 2020-07-13 PROCEDURE — 3079F DIAST BP 80-89 MM HG: CPT | Performed by: FAMILY MEDICINE

## 2020-07-13 PROCEDURE — 3077F SYST BP >= 140 MM HG: CPT | Performed by: FAMILY MEDICINE

## 2020-07-13 PROCEDURE — 1036F TOBACCO NON-USER: CPT | Performed by: FAMILY MEDICINE

## 2020-07-13 PROCEDURE — 2022F DILAT RTA XM EVC RTNOPTHY: CPT | Performed by: FAMILY MEDICINE

## 2020-07-13 PROCEDURE — 3008F BODY MASS INDEX DOCD: CPT | Performed by: FAMILY MEDICINE

## 2020-07-13 PROCEDURE — 4040F PNEUMOC VAC/ADMIN/RCVD: CPT | Performed by: FAMILY MEDICINE

## 2020-07-13 PROCEDURE — 3044F HG A1C LEVEL LT 7.0%: CPT | Performed by: FAMILY MEDICINE

## 2020-07-13 PROCEDURE — 99214 OFFICE O/P EST MOD 30 MIN: CPT | Performed by: FAMILY MEDICINE

## 2020-07-13 RX ORDER — METFORMIN HYDROCHLORIDE 500 MG/1
500 TABLET, EXTENDED RELEASE ORAL
Qty: 90 TABLET | Refills: 3
Start: 2020-07-13 | End: 2020-11-18 | Stop reason: SDUPTHER

## 2020-07-13 NOTE — ASSESSMENT & PLAN NOTE
Lab Results   Component Value Date    HGBA1C 6 0 (H) 07/07/2020   Decreased metformin to once daily as she is having some persistent intermittent diarrhea  Repeat A1c within 4 months and follow-up at that time

## 2020-07-13 NOTE — LETTER
Diabetic Foot Exam Form    Date Requested: 20  Patient: David Torrington  Patient : 1955   Referring Provider: Zo Arredondo MD    Diabetic Foot Exam Performed with shoes and socks removed        Yes         No     Date of Diabetic Foot Exam ______________________________  Risk Score ____________________________________________    Left Foot       Visual Inspection         Monofilament Testing Sensory Exam        Pedal Pulses         Additional Comments         Right Foot      Visual Inspection         Monofilament Testing Sensory Exam       Pedal Pulses         Additional Comments         Comments __________________________________________________________    Practice Providing Exam ______________________________________________    Exam Performed By (print name) _______________________________________      Provider Signature ___________________________________________________      These reports are needed for  compliance    Please fax this completed form and a copy of the Diabetic Foot Exam report to our office located at [place] [address] as soon as possible to [phone] fouzia Rea: Phone [phone]    We thank you for your assistance in treating our mutual patient

## 2020-07-13 NOTE — PROGRESS NOTES
Assessment/Plan:       Problem List Items Addressed This Visit        Endocrine    Type 2 diabetes mellitus with hyperglycemia, without long-term current use of insulin ([facility] [address] )       Lab Results   Component Value Date    HGBA1C 6 0 (H) 07/07/2020   Decreased metformin to once daily as she is having some persistent intermittent diarrhea  Repeat A1c within 4 months and follow-up at that time  Relevant Medications    metFORMIN (GLUCOPHAGE-XR) 500 mg 24 hr tablet    Other Relevant Orders    Hemoglobin A1C       Cardiovascular and Mediastinum    Essential hypertension - Primary     Blood pressure was unacceptably elevated today  She has had mostly good blood pressure readings over the past several years  She has gained some weight and has had some increased stress from the COVID crisis  I am going to ask her to get some blood pressure readings at home  We have vetted her monitor previously with her   She is going to get me some readings over the next 2 weeks  Relevant Orders    Comprehensive metabolic panel       Musculoskeletal and Integument    Primary localized osteoarthrosis of ankle and foot    Osteoarthritis of knee     Check x-ray of the knee in light of some worsening right knee pain  Relevant Orders    XR knee 3 vw right non injury       Other    Pure hypercholesterolemia     Continue simvastatin         Relevant Orders    Comprehensive metabolic panel    Class 3 severe obesity due to excess calories with serious comorbidity and body mass index (BMI) of 45 0 to 49 9 in [facility])     Try to work on weight loss in particular with her blood pressure trending upwards  Vitamin D deficiency     Continue to monitor vitamin-D           Relevant Orders    Comprehensive metabolic panel    Insomnia due to medical condition     Stable with trazodone           Other Visit Diagnoses     Type 2 diabetes mellitus without complication, without long-term current use of insulin ([facility] [address] ) Relevant Medications    metFORMIN (GLUCOPHAGE-XR) 500 mg 24 hr tablet            Subjective:      Patient ID: Rose Luna is a 72 y o  female  HPI patient presents today for follow-up for chronic health issues  Overall, she seems to doing pretty well  She is upset with herself that she gained so much weight during COVID  She notes she has not really been active at all  Her calories of remained relatively stable  Fortunately, her diabetic control is improved  Unfortunately, her blood pressure is up today  She denies headache, blurry vision, chest pain or shortness of breath  Her fibromyalgia is present but stable  She is having some pain in her right knee which seems worse over the past 2-3 months  She does have some chronic intermittent knee pain  She has had chronic issues with her feet for which she follows routinely with podiatry and wears special shoes with a brace on her right ankle  Pain is present but stable    She has a history of insomnia which has remained stable with trazodone    The following portions of the patient's history were reviewed and updated as appropriate: allergies, current medications, past family history, past medical history, past social history, past surgical history and problem list       Current Outpatient Medications:     Cholecalciferol (VITAMIN D) 2000 units tablet, Take 2,000 Units by mouth daily, Disp: , Rfl:     FARXIGA 5 MG TABS, TAKE 1 TABLET EVERY MORNING, Disp: 90 tablet, Rfl: 4    fexofenadine (ALLEGRA) 180 MG tablet, Take 180 mg by mouth daily, Disp: , Rfl:     fluticasone (FLONASE) 50 mcg/act nasal spray, 1 spray into each nostril daily, Disp: , Rfl:     gabapentin (NEURONTIN) 300 mg capsule, TAKE 1 CAPSULE IN THE MORNING, 1 CAPSULE AT LUNCH AND 2 CAPSULES AT BEDTIME (Patient taking differently: No sig reported), Disp: 360 capsule, Rfl: 3    metFORMIN (GLUCOPHAGE-XR) 500 mg 24 hr tablet, Take 1 tablet (500 mg total) by mouth daily with breakfast, Disp: 90 tablet, Rfl: 3    naproxen sodium (ALEVE) 220 MG tablet, Take 2 tabs every morning  Take 2 tabs in the evening as needed  , Disp: , Rfl:     nystatin (MYCOSTATIN) powder, Apply 1 application topically 2 (two) times a day As directed, Disp: 60 g, Rfl: 5    quinapril (ACCUPRIL) 40 MG tablet, TAKE 1 TABLET DAILY, Disp: 90 tablet, Rfl: 3    simvastatin (ZOCOR) 40 mg tablet, TAKE 1 TABLET DAILY, Disp: 90 tablet, Rfl: 3    traZODone (DESYREL) 50 mg tablet, Take 1 tablet (50 mg total) by mouth daily at bedtime as needed for sleep, Disp: 90 tablet, Rfl: 3     Review of Systems   Constitutional: Negative for appetite change, chills, fatigue, fever and unexpected weight change (  She has gained weight but notes she has not been active during the COVID crisis )  HENT: Negative for trouble swallowing  Eyes: Negative for visual disturbance  Respiratory: Negative for cough, chest tightness, shortness of breath and wheezing  Cardiovascular: Positive for leg swelling (Chronic and mild at this time  )  Negative for chest pain and palpitations  Gastrointestinal: Negative for abdominal distention, abdominal pain, blood in stool, constipation and diarrhea  Endocrine: Negative for polyuria  Genitourinary: Negative for difficulty urinating and flank pain  Musculoskeletal: Positive for arthralgias and gait problem  Negative for joint swelling and myalgias  Skin: Negative for rash  Neurological: Negative for dizziness and light-headedness  Hematological: Negative for adenopathy  Does not bruise/bleed easily  Psychiatric/Behavioral: Negative for sleep disturbance  Objective:      /80   Pulse 104   Temp 97 8 °F (36 6 °C)   Resp 18   Ht 5' 5" (1 651 m)   Wt 133 kg (294 lb)   SpO2 95%   BMI 48 92 kg/m²          Physical Exam   Constitutional: She is oriented to person, place, and time  She appears well-developed and well-nourished  No distress  HENT:   Head: Normocephalic     Eyes: Pupils are equal, round, and reactive to light  Right eye exhibits no discharge  Left eye exhibits no discharge  Neck: No tracheal deviation present  No thyromegaly present  Cardiovascular: Normal rate, regular rhythm and normal heart sounds  No murmur heard  Pulmonary/Chest: Effort normal  No respiratory distress  She has no wheezes  She has no rales  Abdominal: Soft  She exhibits no distension  There is no tenderness  Musculoskeletal: Normal range of motion  She exhibits tenderness ( mild medial joint space TTP) and deformity  She exhibits no edema  Lymphadenopathy:     She has no cervical adenopathy  Neurological: She is alert and oriented to person, place, and time  No cranial nerve deficit  Skin: Skin is warm  She is not diaphoretic  No erythema  Psychiatric: She has a normal mood and affect   Judgment and thought content normal          Sigifredo Grimm MD

## 2020-07-13 NOTE — TELEPHONE ENCOUNTER
Upon review of the In Basket request and the patient's chart, initial outreach has been made via fax, please see Contacts section for details  A second outreach attempt will be made within 5 business days      Thank you  Miquel Fernandes MA

## 2020-07-13 NOTE — TELEPHONE ENCOUNTER
----- Message from Coni Ridley sent at 7/13/2020  9:41 AM EDT -----  Regarding: Foot exam / [facility]  07/13/20 9:41 AM    Hello, our patient Alan Landers has had Diabetic Foot Exam completed/performed  Please assist in updating the patient chart by making an External outreach to    Sara Fraser DPM  Podiatry   Phone: [phone];  Fax: [phone]    Thank you,  Len Delgado MA  [facility]

## 2020-07-13 NOTE — ASSESSMENT & PLAN NOTE
Blood pressure was unacceptably elevated today  She has had mostly good blood pressure readings over the past several years  She has gained some weight and has had some increased stress from the COVID crisis  I am going to ask her to get some blood pressure readings at home  We have vetted her monitor previously with her   She is going to get me some readings over the next 2 weeks

## 2020-07-14 NOTE — TELEPHONE ENCOUNTER
Upon review of your request/inquiry, we have found/obtained the documentation  After careful review of the document we are unable to complete this request for Diabetic Foot Exam because the documentation does not have the proper verbiage (wording) needed to close the requested care gap(s)  Any additional questions or concerns should be emailed to the Practice Liaisons via [email or web address] email, please do not reply via In Basket       Thank you  Leticia Eubanks MA

## 2020-08-31 LAB
LEFT EYE DIABETIC RETINOPATHY: NORMAL
RIGHT EYE DIABETIC RETINOPATHY: NORMAL

## 2020-10-22 ENCOUNTER — IMMUNIZATIONS (OUTPATIENT)
Dept: FAMILY MEDICINE CLINIC | Facility: CLINIC | Age: 65
End: 2020-10-22

## 2020-10-22 VITALS — TEMPERATURE: 98.4 F

## 2020-10-22 DIAGNOSIS — Z23 NEED FOR VACCINATION: Primary | ICD-10-CM

## 2020-11-12 ENCOUNTER — HOSPITAL ENCOUNTER (OUTPATIENT)
Dept: RADIOLOGY | Facility: IMAGING CENTER | Age: 65
Discharge: HOME | End: 2020-11-12
Payer: COMMERCIAL

## 2020-11-12 ENCOUNTER — LAB (OUTPATIENT)
Dept: LAB | Facility: IMAGING CENTER | Age: 65
End: 2020-11-12
Payer: COMMERCIAL

## 2020-11-12 DIAGNOSIS — E78.00 PURE HYPERCHOLESTEROLEMIA: ICD-10-CM

## 2020-11-12 DIAGNOSIS — M17.11 PRIMARY OSTEOARTHRITIS OF RIGHT KNEE: ICD-10-CM

## 2020-11-12 DIAGNOSIS — I10 ESSENTIAL HYPERTENSION: ICD-10-CM

## 2020-11-12 DIAGNOSIS — E55.9 VITAMIN D DEFICIENCY: ICD-10-CM

## 2020-11-12 DIAGNOSIS — E11.65 TYPE 2 DIABETES MELLITUS WITH HYPERGLYCEMIA, WITHOUT LONG-TERM CURRENT USE OF INSULIN (HCC): ICD-10-CM

## 2020-11-12 LAB
ALBUMIN SERPL BCP-MCNC: 3.8 G/DL (ref 3.5–5)
ALP SERPL-CCNC: 68 U/L (ref 46–116)
ALT SERPL W P-5'-P-CCNC: 23 U/L (ref 12–78)
ANION GAP SERPL CALCULATED.3IONS-SCNC: 5 MMOL/L (ref 4–13)
AST SERPL W P-5'-P-CCNC: 17 U/L (ref 5–45)
BILIRUB SERPL-MCNC: 0.59 MG/DL (ref 0.2–1)
BUN SERPL-MCNC: 13 MG/DL (ref 5–25)
CALCIUM SERPL-MCNC: 9.8 MG/DL (ref 8.3–10.1)
CHLORIDE SERPL-SCNC: 106 MMOL/L (ref 100–108)
CO2 SERPL-SCNC: 29 MMOL/L (ref 21–32)
CREAT SERPL-MCNC: 0.74 MG/DL (ref 0.6–1.3)
EST. AVERAGE GLUCOSE BLD GHB EST-MCNC: 134 MG/DL
GFR SERPL CREATININE-BSD FRML MDRD: 85 ML/MIN/1.73SQ M
GLUCOSE SERPL-MCNC: 163 MG/DL (ref 65–140)
HBA1C MFR BLD: 6.3 %
POTASSIUM SERPL-SCNC: 4.6 MMOL/L (ref 3.5–5.3)
PROT SERPL-MCNC: 7.4 G/DL (ref 6.4–8.2)
SODIUM SERPL-SCNC: 140 MMOL/L (ref 136–145)

## 2020-11-12 PROCEDURE — 80053 COMPREHEN METABOLIC PANEL: CPT

## 2020-11-12 PROCEDURE — 36415 COLL VENOUS BLD VENIPUNCTURE: CPT

## 2020-11-12 PROCEDURE — 83036 HEMOGLOBIN GLYCOSYLATED A1C: CPT

## 2020-11-12 PROCEDURE — 3044F HG A1C LEVEL LT 7.0%: CPT | Performed by: FAMILY MEDICINE

## 2020-11-12 PROCEDURE — 73562 X-RAY EXAM OF KNEE 3: CPT

## 2020-11-18 ENCOUNTER — OFFICE VISIT (OUTPATIENT)
Dept: FAMILY MEDICINE CLINIC | Facility: CLINIC | Age: 65
End: 2020-11-18
Payer: COMMERCIAL

## 2020-11-18 DIAGNOSIS — E11.9 TYPE 2 DIABETES MELLITUS WITHOUT COMPLICATION, WITHOUT LONG-TERM CURRENT USE OF INSULIN (HCC): ICD-10-CM

## 2020-11-18 DIAGNOSIS — E11.65 TYPE 2 DIABETES MELLITUS WITH HYPERGLYCEMIA, WITHOUT LONG-TERM CURRENT USE OF INSULIN (HCC): ICD-10-CM

## 2020-11-18 DIAGNOSIS — E55.9 VITAMIN D DEFICIENCY: ICD-10-CM

## 2020-11-18 DIAGNOSIS — E66.01 OBESITY, CLASS III, BMI 40-49.9 (MORBID OBESITY) (HCC): ICD-10-CM

## 2020-11-18 DIAGNOSIS — Z23 NEED FOR VACCINATION: Primary | ICD-10-CM

## 2020-11-18 DIAGNOSIS — M17.11 PRIMARY OSTEOARTHRITIS OF RIGHT KNEE: ICD-10-CM

## 2020-11-18 DIAGNOSIS — E66.01 CLASS 3 SEVERE OBESITY DUE TO EXCESS CALORIES WITH SERIOUS COMORBIDITY AND BODY MASS INDEX (BMI) OF 45.0 TO 49.9 IN ADULT (HCC): ICD-10-CM

## 2020-11-18 PROCEDURE — 99214 OFFICE O/P EST MOD 30 MIN: CPT | Performed by: FAMILY MEDICINE

## 2020-11-18 PROCEDURE — 3074F SYST BP LT 130 MM HG: CPT | Performed by: FAMILY MEDICINE

## 2020-11-18 PROCEDURE — 4040F PNEUMOC VAC/ADMIN/RCVD: CPT | Performed by: FAMILY MEDICINE

## 2020-11-18 PROCEDURE — 1036F TOBACCO NON-USER: CPT | Performed by: FAMILY MEDICINE

## 2020-11-18 PROCEDURE — 3008F BODY MASS INDEX DOCD: CPT | Performed by: FAMILY MEDICINE

## 2020-11-18 PROCEDURE — G0008 ADMIN INFLUENZA VIRUS VAC: HCPCS

## 2020-11-18 PROCEDURE — G0009 ADMIN PNEUMOCOCCAL VACCINE: HCPCS

## 2020-11-18 PROCEDURE — 90662 IIV NO PRSV INCREASED AG IM: CPT

## 2020-11-18 PROCEDURE — 20610 DRAIN/INJ JOINT/BURSA W/O US: CPT | Performed by: FAMILY MEDICINE

## 2020-11-18 PROCEDURE — 3078F DIAST BP <80 MM HG: CPT | Performed by: FAMILY MEDICINE

## 2020-11-18 PROCEDURE — 90732 PPSV23 VACC 2 YRS+ SUBQ/IM: CPT

## 2020-11-18 PROCEDURE — 3725F SCREEN DEPRESSION PERFORMED: CPT | Performed by: FAMILY MEDICINE

## 2020-11-18 RX ORDER — LIDOCAINE HYDROCHLORIDE 10 MG/ML
2 INJECTION, SOLUTION INFILTRATION; PERINEURAL
Status: COMPLETED | OUTPATIENT
Start: 2020-11-18 | End: 2020-11-18

## 2020-11-18 RX ORDER — TRIAMCINOLONE ACETONIDE 40 MG/ML
40 INJECTION, SUSPENSION INTRA-ARTICULAR; INTRAMUSCULAR
Status: COMPLETED | OUTPATIENT
Start: 2020-11-18 | End: 2020-11-18

## 2020-11-18 RX ORDER — CIPROFLOXACIN 250 MG/1
TABLET, FILM COATED ORAL
COMMUNITY
Start: 2020-11-17

## 2020-11-18 RX ORDER — METFORMIN HYDROCHLORIDE 500 MG/1
1000 TABLET, EXTENDED RELEASE ORAL
Qty: 180 TABLET | Refills: 3
Start: 2020-11-18 | End: 2021-05-24

## 2020-11-18 RX ADMIN — TRIAMCINOLONE ACETONIDE 40 MG: 40 INJECTION, SUSPENSION INTRA-ARTICULAR; INTRAMUSCULAR at 13:53

## 2020-11-18 RX ADMIN — LIDOCAINE HYDROCHLORIDE 2 ML: 10 INJECTION, SOLUTION INFILTRATION; PERINEURAL at 13:53

## 2020-11-19 ENCOUNTER — TELEPHONE (OUTPATIENT)
Dept: ADMINISTRATIVE | Facility: OTHER | Age: 65
End: 2020-11-19

## 2020-11-19 VITALS
HEIGHT: 65 IN | WEIGHT: 293 LBS | DIASTOLIC BLOOD PRESSURE: 75 MMHG | TEMPERATURE: 98.2 F | BODY MASS INDEX: 48.82 KG/M2 | RESPIRATION RATE: 18 BRPM | HEART RATE: 56 BPM | SYSTOLIC BLOOD PRESSURE: 126 MMHG | OXYGEN SATURATION: 96 %

## 2020-12-01 ENCOUNTER — VBI (OUTPATIENT)
Dept: ADMINISTRATIVE | Facility: OTHER | Age: 65
End: 2020-12-01

## 2020-12-14 ENCOUNTER — APPOINTMENT (OUTPATIENT)
Dept: LAB | Facility: IMAGING CENTER | Age: 65
End: 2020-12-14
Payer: COMMERCIAL

## 2020-12-14 ENCOUNTER — TRANSCRIBE ORDERS (OUTPATIENT)
Dept: ADMINISTRATIVE | Facility: HOSPITAL | Age: 65
End: 2020-12-14

## 2020-12-14 DIAGNOSIS — E11.65 TYPE 2 DIABETES MELLITUS WITH HYPERGLYCEMIA, WITHOUT LONG-TERM CURRENT USE OF INSULIN (HCC): ICD-10-CM

## 2020-12-14 LAB
CREAT UR-MCNC: 72.9 MG/DL
MICROALBUMIN UR-MCNC: 13 MG/L (ref 0–20)
MICROALBUMIN/CREAT 24H UR: 18 MG/G CREATININE (ref 0–30)

## 2020-12-14 PROCEDURE — 82570 ASSAY OF URINE CREATININE: CPT

## 2020-12-14 PROCEDURE — 82043 UR ALBUMIN QUANTITATIVE: CPT

## 2020-12-14 PROCEDURE — 3061F NEG MICROALBUMINURIA REV: CPT | Performed by: FAMILY MEDICINE

## 2020-12-14 PROCEDURE — 87086 URINE CULTURE/COLONY COUNT: CPT

## 2020-12-14 PROCEDURE — 81001 URINALYSIS AUTO W/SCOPE: CPT

## 2020-12-15 LAB
BACTERIA UR QL AUTO: ABNORMAL /HPF
BILIRUB UR QL STRIP: NEGATIVE
CLARITY UR: CLEAR
COLOR UR: YELLOW
GLUCOSE UR STRIP-MCNC: ABNORMAL MG/DL
HGB UR QL STRIP.AUTO: NEGATIVE
KETONES UR STRIP-MCNC: ABNORMAL MG/DL
LEUKOCYTE ESTERASE UR QL STRIP: ABNORMAL
NITRITE UR QL STRIP: NEGATIVE
NON-SQ EPI CELLS URNS QL MICRO: ABNORMAL /HPF
OTHER STN SPEC: ABNORMAL
PH UR STRIP.AUTO: 6 [PH]
PROT UR STRIP-MCNC: NEGATIVE MG/DL
RBC #/AREA URNS AUTO: ABNORMAL /HPF
SP GR UR STRIP.AUTO: 1.04 (ref 1–1.03)
UROBILINOGEN UR QL STRIP.AUTO: 0.2 E.U./DL
WBC #/AREA URNS AUTO: ABNORMAL /HPF

## 2020-12-16 LAB — BACTERIA UR CULT: NORMAL

## 2021-01-13 ENCOUNTER — OFFICE VISIT (OUTPATIENT)
Dept: FAMILY MEDICINE CLINIC | Facility: CLINIC | Age: 66
End: 2021-01-13
Payer: COMMERCIAL

## 2021-01-13 VITALS
BODY MASS INDEX: 48.82 KG/M2 | TEMPERATURE: 97.6 F | HEIGHT: 65 IN | DIASTOLIC BLOOD PRESSURE: 82 MMHG | SYSTOLIC BLOOD PRESSURE: 120 MMHG | HEART RATE: 80 BPM | WEIGHT: 293 LBS | OXYGEN SATURATION: 95 % | RESPIRATION RATE: 18 BRPM

## 2021-01-13 DIAGNOSIS — E66.01 MORBID OBESITY WITH BMI OF 50.0-59.9, ADULT (HCC): ICD-10-CM

## 2021-01-13 DIAGNOSIS — Z12.31 ENCOUNTER FOR SCREENING MAMMOGRAM FOR MALIGNANT NEOPLASM OF BREAST: ICD-10-CM

## 2021-01-13 DIAGNOSIS — E11.65 TYPE 2 DIABETES MELLITUS WITH HYPERGLYCEMIA, WITHOUT LONG-TERM CURRENT USE OF INSULIN (HCC): ICD-10-CM

## 2021-01-13 DIAGNOSIS — J06.9 VIRAL UPPER RESPIRATORY TRACT INFECTION: ICD-10-CM

## 2021-01-13 DIAGNOSIS — Z00.00 MEDICARE ANNUAL WELLNESS VISIT, SUBSEQUENT: Primary | ICD-10-CM

## 2021-01-13 DIAGNOSIS — G47.01 INSOMNIA DUE TO MEDICAL CONDITION: ICD-10-CM

## 2021-01-13 DIAGNOSIS — I10 ESSENTIAL HYPERTENSION: ICD-10-CM

## 2021-01-13 DIAGNOSIS — E55.9 VITAMIN D DEFICIENCY: ICD-10-CM

## 2021-01-13 DIAGNOSIS — Z78.0 POST-MENOPAUSAL: ICD-10-CM

## 2021-01-13 DIAGNOSIS — K64.8 OTHER HEMORRHOIDS: ICD-10-CM

## 2021-01-13 DIAGNOSIS — E78.00 PURE HYPERCHOLESTEROLEMIA: ICD-10-CM

## 2021-01-13 DIAGNOSIS — E66.01 CLASS 3 SEVERE OBESITY DUE TO EXCESS CALORIES WITH SERIOUS COMORBIDITY AND BODY MASS INDEX (BMI) OF 45.0 TO 49.9 IN ADULT (HCC): ICD-10-CM

## 2021-01-13 DIAGNOSIS — Z11.4 SCREENING FOR HIV (HUMAN IMMUNODEFICIENCY VIRUS): ICD-10-CM

## 2021-01-13 PROCEDURE — 3008F BODY MASS INDEX DOCD: CPT | Performed by: FAMILY MEDICINE

## 2021-01-13 PROCEDURE — 3288F FALL RISK ASSESSMENT DOCD: CPT | Performed by: FAMILY MEDICINE

## 2021-01-13 PROCEDURE — G0439 PPPS, SUBSEQ VISIT: HCPCS | Performed by: FAMILY MEDICINE

## 2021-01-13 PROCEDURE — 3074F SYST BP LT 130 MM HG: CPT | Performed by: FAMILY MEDICINE

## 2021-01-13 PROCEDURE — 1101F PT FALLS ASSESS-DOCD LE1/YR: CPT | Performed by: FAMILY MEDICINE

## 2021-01-13 PROCEDURE — 1036F TOBACCO NON-USER: CPT | Performed by: FAMILY MEDICINE

## 2021-01-13 PROCEDURE — 1170F FXNL STATUS ASSESSED: CPT | Performed by: FAMILY MEDICINE

## 2021-01-13 PROCEDURE — 1160F RVW MEDS BY RX/DR IN RCRD: CPT | Performed by: FAMILY MEDICINE

## 2021-01-13 PROCEDURE — 1125F AMNT PAIN NOTED PAIN PRSNT: CPT | Performed by: FAMILY MEDICINE

## 2021-01-13 PROCEDURE — 3725F SCREEN DEPRESSION PERFORMED: CPT | Performed by: FAMILY MEDICINE

## 2021-01-13 PROCEDURE — 3079F DIAST BP 80-89 MM HG: CPT | Performed by: FAMILY MEDICINE

## 2021-01-13 PROCEDURE — 99214 OFFICE O/P EST MOD 30 MIN: CPT | Performed by: FAMILY MEDICINE

## 2021-01-13 NOTE — ASSESSMENT & PLAN NOTE
Lab Results   Component Value Date    HGBA1C 6 3 (H) 11/12/2020   Check labs prior to follow-up in 3 months

## 2021-01-13 NOTE — PROGRESS NOTES
Assessment and Plan:     Problem List Items Addressed This Visit        Digestive    Other hemorrhoids     She has an external hemorrhoid that has what sounds like a purulent discharge intermittently  I am going to have her see Colorectal surgery  Relevant Orders    Ambulatory referral to Colorectal Surgery       Endocrine    Type 2 diabetes mellitus with hyperglycemia, without long-term current use of insulin ([facility] )       Lab Results   Component Value Date    HGBA1C 6 3 (H) 11/12/2020   Check labs prior to follow-up in 3 months  Cardiovascular and Mediastinum    Essential hypertension     Well controlled this time  Continue current regimen  Other    Pure hypercholesterolemia    Class 3 severe obesity due to excess calories with serious comorbidity and body mass index (BMI) of 45 0 to 49 9 in adult [facility])    Vitamin D deficiency    Insomnia due to medical condition     She is under lot of stress due to the current COVID environment but seems to be managing  Other Visit Diagnoses     Medicare annual wellness visit, subsequent    -  Primary    Morbid obesity with BMI of 50 0-59 9, adult ([facility] [address] )        Post-menopausal        Screening for HIV (human immunodeficiency virus)        Relevant Orders    HIV 1/2 Antigen/Antibody (4th Generation) w Reflex [facility]    Viral upper respiratory tract infection        Encounter for screening mammogram for malignant neoplasm of breast        Relevant Orders    Mammo diagnostic bilateral w cad        BMI Counseling: Body mass index is 50 59 kg/m²  The BMI is above normal  Nutrition recommendations include encouraging healthy choices of fruits and vegetables  Exercise recommendations include moderate physical activity 150 minutes/week  Preventive health issues were discussed with patient, and age appropriate screening tests were ordered as noted in patient's After Visit Summary    Personalized health advice and appropriate referrals for health education or preventive services given if needed, as noted in patient's After Visit Summary  History of Present Illness:     Patient presents for Medicare Annual Wellness visit    Patient Care Team:  Alfredo Landry MD as PCP - General  MD Maggie Orellana MD (Urogynecology)  Tripp Delcid DPM (Podiatry)  Lori Corral MD as [address] [place] [address] (Ophthalmology)     Problem List:     Patient Active Problem List   Diagnosis    [facility] with [facility]cemia[facility]    Fibromyalgia    Neurologic gait dysfunction    Pure hypercholesterolemia    Essential hypertension    Irritable bowel syndrome    Lower back pain    Class 3 severe obesity due to excess calories with serious comorbidity and body mass index (BMI) of 45 0 to 49 9 in adult ([facility] [address] )    Rosacea    Urinary incontinence    Vitamin D deficiency    Congenital valgus deformity of foot    Primary localized osteoarthrosis of ankle and foot    Osteoarthritis of hip    Primary osteoarthritis of right knee    Tibialis tendinitis    History of total hip replacement    Insomnia due to medical condition    Other hemorrhoids      Past Medical and Surgical History:     Past Medical History:   Diagnosis Date    Anemia     last assessed: 2015    Dysfunction of eustachian tube     unspecified laterality; last assessed: 2013    Endometriosis     Fibromyalgia     Hyperlipidemia     IBS (irritable bowel syndrome)     Vitamin D deficiency      Past Surgical History:   Procedure Laterality Date    ANKLE SURGERY      , , -extensive surgery     SECTION      x2    JOINT REPLACEMENT      hip    LUMBAR EPIDURAL INJECTION  12/2005    x 1    IA COLONOSCOPY FLX DX W/COLLJ SPEC WHEN PFRMD N/A 8/3/2017    Procedure: COLONOSCOPY with polypectomy;  Surgeon: Lori Corral MD;  Location: [facility];   Service: Gastroenterology    TOTAL HIP ARTHROPLASTY Left 01/2013      Family History:     Family History   Problem Relation Age of Onset    Other Mother         CABG    Other Father         gangrene    No Known Problems Sister     No Known Problems Daughter     No Known Problems Maternal Aunt     No Known Problems Maternal Aunt     No Known Problems Maternal Aunt     No Known Problems Maternal Aunt     No Known Problems Maternal Grandmother     No Known Problems Maternal Grandfather     No Known Problems Paternal Grandmother     No Known Problems Paternal Grandfather       Social History:        Social History     Socioeconomic History    Marital status: /Civil Union     Spouse name: None    Number of children: None    Years of education: None    Highest education level: None   Occupational History    None   Social Needs    Financial resource strain: None    Food insecurity     Worry: None     Inability: None    Transportation needs     Medical: None     Non-medical: None   Tobacco Use    Smoking status: Never Smoker    Smokeless tobacco: Never Used   Substance and Sexual Activity    Alcohol use: Yes     Frequency: Monthly or less     Comment: socail;  5 drinks weekly (as per [employer])    Drug use: No    Sexual activity: Yes     Partners: Male   Lifestyle    Physical activity     Days per week: None     Minutes per session: None    Stress: None   Relationships    Social connections     Talks on phone: None     Gets together: None     Attends Gnosticist service: None     Active member of club or organization: None     Attends meetings of clubs or organizations: None     Relationship status: None    Intimate partner violence     Fear of current or ex partner: None     Emotionally abused: None     Physically abused: None     Forced sexual activity: None   Other Topics Concern    None   Social History Narrative    None      Medications and Allergies:     Current Outpatient Medications   Medication Sig Dispense Refill  Cholecalciferol (VITAMIN D) 2000 units tablet Take 2,000 Units by mouth daily      ciprofloxacin (CIPRO) 250 mg tablet Every 6 months before bladder injections      FARXIGA 5 MG TABS TAKE 1 TABLET EVERY MORNING 90 tablet 4    fexofenadine (ALLEGRA) 180 MG tablet Take 180 mg by mouth daily      fluticasone (FLONASE) 50 mcg/act nasal spray 1 spray into each nostril daily      gabapentin (NEURONTIN) 300 mg capsule TAKE 1 CAPSULE IN THE MORNING, 1 CAPSULE AT LUNCH AND 2 CAPSULES AT BEDTIME (Patient taking differently: No sig reported) 360 capsule 3    metFORMIN (GLUCOPHAGE-XR) 500 mg 24 hr tablet Take 2 tablets (1,000 mg total) by mouth daily with breakfast 180 tablet 3    naproxen sodium (ALEVE) 220 MG tablet Take 2 tabs every morning  Take 2 tabs in the evening as needed   nystatin (MYCOSTATIN) powder Apply 1 application topically 2 (two) times a day As directed 60 g 5    quinapril (ACCUPRIL) 40 MG tablet TAKE 1 TABLET DAILY 90 tablet 3    traZODone (DESYREL) 50 mg tablet Take 1 tablet (50 mg total) by mouth daily at bedtime as needed for sleep 90 tablet 3     No current facility-administered medications for this visit        Allergies   Allergen Reactions    Augmentin [Amoxicillin-Pot Clavulanate] Nausea Only     Category: nausea,diarrhea;     Azithromycin Nausea Only    Doxycycline GI Intolerance    Erythromycin GI Intolerance    Lodine [Etodolac]      Category: rash;     Lorazepam      [facility] - [id number]: mental status changes    Sulfa Antibiotics      Category: rash;       Immunizations:     Immunization History   Administered Date(s) Administered    INFLUENZA 10/14/2009, 09/23/2015, 01/05/2017, 12/20/2017    Influenza Quadrivalent Preservative Free 3 years and older IM 12/23/2014, 01/05/2017    Influenza, high dose seasonal 0 7 mL 11/18/2020    Influenza, recombinant, quadrivalent,injectable, preservative free 09/14/2018, 11/05/2019    Influenza, seasonal, injectable 09/30/2011, 10/09/2012    Pneumococcal Polysaccharide PPV23 12/15/2010, 11/18/2020    Td (adult), adsorbed 05/10/2002      Health Maintenance:         Topic Date Due    HIV Screening  05/06/1970    DXA SCAN  07/13/2021 (Originally 9/26/2019)    MAMMOGRAM  05/26/2021    Colonoscopy Surveillance  08/03/2022    Colorectal Cancer Screening  08/03/2027    Hepatitis C Screening  Completed         Topic Date Due    DTaP,Tdap,and Td Vaccines (1 - Tdap) 05/06/1976      Medicare Health Risk Assessment:     /82 (BP Location: Left arm, Patient Position: Sitting, Cuff Size: Large)   Pulse 80   Temp 97 6 °F (36 4 °C) (Temporal)   Resp 18   Ht 5' 5" (1 651 m)   Wt (!) 138 kg (304 lb)   SpO2 95%   BMI 50 59 kg/m²      Bonifacio Hughes is here for her Subsequent Wellness visit  Health Risk Assessment:   Patient rates overall health as fair  Patient feels that their physical health rating is same  Eyesight was rated as same  Hearing was rated as same  Patient feels that their emotional and mental health rating is same  Pain experienced in the last 7 days has been some  Patient's pain rating has been 3/10  Patient states that she has experienced no weight loss or gain in last 6 months  Has chronic significant low back pain as well as arthritic issues with bilateral ankles  Depression Screening:   PHQ-2 Score: 0      Fall Risk Screening: In the past year, patient has experienced: no history of falling in past year      Urinary Incontinence Screening:   Patient has leaked urine accidently in the last six months  Being treated by Dr Emerson Bolanos:  Patient has trouble with stairs inside or outside of their home  Patient has working smoke alarms and has working carbon monoxide detector  Home safety hazards include: none  Nutrition:   Current diet is Unhealthy  Medications:   Patient is currently taking over-the-counter supplements  OTC medications include: Aleve, vitamin D  Patient is able to manage medications  Activities of Daily Living (ADLs)/Instrumental Activities of Daily Living (IADLs):   Walk and transfer into and out of bed and chair?: Yes  Dress and groom yourself?: Yes    Bathe or shower yourself?: Yes    Feed yourself? Yes  Do your laundry/housekeeping?: Yes  Manage your money, pay your bills and track your expenses?: Yes  Make your own meals?: Yes    Do your own shopping?: No    Previous Hospitalizations:   Any hospitalizations or ED visits within the last 12 months?: No      Advance Care Planning:   Living will: Yes    Durable POA for healthcare: No    Advanced directive: No    End of Life Decisions reviewed with patient: Yes      Cognitive Screening:   Provider or family/friend/caregiver concerned regarding cognition?: No    PREVENTIVE SCREENINGS      Cardiovascular Screening:    General: Screening Not Indicated and History Lipid Disorder      Diabetes Screening:     General: Screening Not Indicated and History Diabetes      Colorectal Cancer Screening:     General: Screening Current      Breast Cancer Screening:     General: Screening Current      Cervical Cancer Screening:    General: Screening Not Indicated      Osteoporosis Screening:    General: Screening Current      Abdominal Aortic Aneurysm (AAA) Screening:        General: Screening Not Indicated      Lung Cancer Screening:     General: Screening Not Indicated      Hepatitis C Screening:    General: Screening Current  The patient presents today for Medicare wellness visit  Overall, she is pretty much stable  She is up-to-date on vaccines except for tetanus and I counseled her that she can get this vaccine at the pharmacy  She screens negative for cognitive decline or depression    She was given a prescription for mammogram     Aime Jasso MD

## 2021-01-13 NOTE — ASSESSMENT & PLAN NOTE
She has an external hemorrhoid that has what sounds like a purulent discharge intermittently  I am going to have her see Colorectal surgery

## 2021-01-13 NOTE — PROGRESS NOTES
Assessment/Plan:       Problem List Items Addressed This Visit        Digestive    Other hemorrhoids     She has an external hemorrhoid that has what sounds like a purulent discharge intermittently  I am going to have her see Colorectal surgery  Relevant Orders    Ambulatory referral to Colorectal Surgery       Endocrine    Type 2 diabetes mellitus with hyperglycemia, without long-term current use of insulin ([facility] )       Lab Results   Component Value Date    HGBA1C 6 3 (H) 11/12/2020   Check labs prior to follow-up in 3 months  Cardiovascular and Mediastinum    Essential hypertension     Well controlled this time  Continue current regimen  Other    Pure hypercholesterolemia    Class 3 severe obesity due to excess calories with serious comorbidity and body mass index (BMI) of 45 0 to 49 9 in adult [facility])    Vitamin D deficiency    Insomnia due to medical condition     She is under lot of stress due to the current COVID environment but seems to be managing  Other Visit Diagnoses     Medicare annual wellness visit, subsequent    -  Primary    Morbid obesity with BMI of 50 0-59 9, adult ([facility] [address] )        Post-menopausal        Screening for HIV (human immunodeficiency virus)        Relevant Orders    HIV 1/2 Antigen/Antibody (4th Generation) w Reflex [facility]    Viral upper respiratory tract infection        Encounter for screening mammogram for malignant neoplasm of breast        Relevant Orders    Mammo diagnostic bilateral w cad            Subjective:      Patient ID: Vitaly Hernandez is a 72 y o  female      HPI    The following portions of the patient's history were reviewed and updated as appropriate: allergies, current medications, past family history, past medical history, past social history, past surgical history and problem list       Current Outpatient Medications:     Cholecalciferol (VITAMIN D) 2000 units tablet, Take 2,000 Units by mouth daily, Disp: , Rfl:     ciprofloxacin (CIPRO) 250 mg tablet, Every 6 months before bladder injections, Disp: , Rfl:     FARXIGA 5 MG TABS, TAKE 1 TABLET EVERY MORNING, Disp: 90 tablet, Rfl: 4    fexofenadine (ALLEGRA) 180 MG tablet, Take 180 mg by mouth daily, Disp: , Rfl:     fluticasone (FLONASE) 50 mcg/act nasal spray, 1 spray into each nostril daily, Disp: , Rfl:     gabapentin (NEURONTIN) 300 mg capsule, TAKE 1 CAPSULE IN THE MORNING, 1 CAPSULE AT LUNCH AND 2 CAPSULES AT BEDTIME (Patient taking differently: No sig reported), Disp: 360 capsule, Rfl: 3    metFORMIN (GLUCOPHAGE-XR) 500 mg 24 hr tablet, Take 2 tablets (1,000 mg total) by mouth daily with breakfast, Disp: 180 tablet, Rfl: 3    naproxen sodium (ALEVE) 220 MG tablet, Take 2 tabs every morning  Take 2 tabs in the evening as needed  , Disp: , Rfl:     nystatin (MYCOSTATIN) powder, Apply 1 application topically 2 (two) times a day As directed, Disp: 60 g, Rfl: 5    quinapril (ACCUPRIL) 40 MG tablet, TAKE 1 TABLET DAILY, Disp: 90 tablet, Rfl: 3    traZODone (DESYREL) 50 mg tablet, Take 1 tablet (50 mg total) by mouth daily at bedtime as needed for sleep, Disp: 90 tablet, Rfl: 3     Review of Systems   Constitutional: Negative for appetite change, chills, fatigue, fever and unexpected weight change  HENT: Negative for trouble swallowing  Eyes: Negative for visual disturbance  Respiratory: Negative for cough, chest tightness, shortness of breath and wheezing  Cardiovascular: Negative for chest pain, palpitations and leg swelling  Gastrointestinal: Negative for abdominal distention, abdominal pain, blood in stool, constipation and diarrhea  Endocrine: Negative for polyuria  Genitourinary: Negative for difficulty urinating and flank pain  Musculoskeletal: Positive for arthralgias, back pain and gait problem (chronic but stable  )  Negative for myalgias  Skin: Negative for rash  Neurological: Negative for dizziness, weakness and light-headedness     Hematological: Negative for adenopathy  Does not bruise/bleed easily  Psychiatric/Behavioral: Negative for dysphoric mood and sleep disturbance  The patient is not nervous/anxious  Objective:      /82 (BP Location: Left arm, Patient Position: Sitting, Cuff Size: Large)   Pulse 80   Temp 97 6 °F (36 4 °C) (Temporal)   Resp 18   Ht 5' 5" (1 651 m)   Wt (!) 138 kg (304 lb)   SpO2 95%   BMI 50 59 kg/m²          Physical Exam  Constitutional:       General: She is not in acute distress  Appearance: She is well-developed  She is obese  She is not diaphoretic  HENT:      Head: Normocephalic  Eyes:      General:         Right eye: No discharge  Left eye: No discharge  Pupils: Pupils are equal, round, and reactive to light  Neck:      Thyroid: No thyromegaly  Trachea: No tracheal deviation  Cardiovascular:      Rate and Rhythm: Normal rate and regular rhythm  Heart sounds: Normal heart sounds  No murmur  Pulmonary:      Effort: Pulmonary effort is normal  No respiratory distress  Breath sounds: No wheezing or rales  Abdominal:      General: There is no distension  Palpations: Abdomen is soft  Tenderness: There is no abdominal tenderness  Musculoskeletal: Normal range of motion  Right lower leg: Edema present  Left lower leg: Edema (trace b/l LE edema) present  Comments: Chronic brace R LE   Lymphadenopathy:      Cervical: No cervical adenopathy  Skin:     General: Skin is warm  Findings: No erythema  Neurological:      Mental Status: She is alert and oriented to person, place, and time  Cranial Nerves: No cranial nerve deficit  Psychiatric:         Thought Content:  Thought content normal          Judgment: Judgment normal            Jolene Coon MD

## 2021-01-13 NOTE — PATIENT INSTRUCTIONS
Medicare Preventive Visit Patient Instructions  Thank you for completing your Welcome to Medicare Visit or Medicare Annual Wellness Visit today  Your next wellness visit will be due in one year (1/13/2022)  The screening/preventive services that you may require over the next 5-10 years are detailed below  Some tests may not apply to you based off risk factors and/or age  Screening tests ordered at today's visit but not completed yet may show as past due  Also, please note that scanned in results may not display below  Preventive Screenings:  Service Recommendations Previous Testing/Comments   Colorectal Cancer Screening  * Colonoscopy    * Fecal Occult Blood Test (FOBT)/Fecal Immunochemical Test (FIT)  * Fecal DNA/Cologuard Test  * Flexible Sigmoidoscopy Age: 54-65 years old   Colonoscopy: every 10 years (may be performed more frequently if at higher risk)  OR  FOBT/FIT: every 1 year  OR  Cologuard: every 3 years  OR  Sigmoidoscopy: every 5 years  Screening may be recommended earlier than age 48 if at higher risk for colorectal cancer  Also, an individualized decision between you and your healthcare provider will decide whether screening between the ages of 74-80 would be appropriate  Colonoscopy: 08/03/2017  FOBT/FIT: Not on file  Cologuard: Not on file  Sigmoidoscopy: Not on file    Screening Current     Breast Cancer Screening Age: 36 years old  Frequency: every 1-2 years  Not required if history of left and right mastectomy Mammogram: 05/26/2020    Screening Current   Cervical Cancer Screening Between the ages of 21-29, pap smear recommended once every 3 years  Between the ages of 33-67, can perform pap smear with HPV co-testing every 5 years     Recommendations may differ for women with a history of total hysterectomy, cervical cancer, or abnormal pap smears in past  Pap Smear: Not on file    Screening Not Indicated   Hepatitis C Screening Once for adults born between 1945 and 1965  More frequently in patients at high risk for Hepatitis C Hep C Antibody: 07/28/2017    Screening Current   Diabetes Screening 1-2 times per year if you're at risk for diabetes or have pre-diabetes Fasting glucose: 134 mg/dL   A1C: 6 3 %    Screening Not Indicated  History Diabetes   Cholesterol Screening Once every 5 years if you don't have a lipid disorder  May order more often based on risk factors  Lipid panel: 09/11/2018    Screening Not Indicated  History Lipid Disorder     Other Preventive Screenings Covered by Medicare:  1  Abdominal Aortic Aneurysm (AAA) Screening: covered once if your at risk  You're considered to be at risk if you have a family history of AAA  2  Lung Cancer Screening: covers low dose CT scan once per year if you meet all of the following conditions: (1) Age 50-69; (2) No signs or symptoms of lung cancer; (3) Current smoker or have quit smoking within the last 15 years; (4) You have a tobacco smoking history of at least 30 pack years (packs per day multiplied by number of years you smoked); (5) You get a written order from a healthcare provider  3  Glaucoma Screening: covered annually if you're considered high risk: (1) You have diabetes OR (2) Family history of glaucoma OR (3)  aged 48 and older OR (3)  American aged 72 and older  3  Osteoporosis Screening: covered every 2 years if you meet one of the following conditions: (1) You're estrogen deficient and at risk for osteoporosis based off medical history and other findings; (2) Have a vertebral abnormality; (3) On glucocorticoid therapy for more than 3 months; (4) Have primary hyperparathyroidism; (5) On osteoporosis medications and need to assess response to drug therapy  · Last bone density test (DXA Scan): 09/26/2017  5  HIV Screening: covered annually if you're between the age of 12-76  Also covered annually if you are younger than 13 and older than 72 with risk factors for HIV infection   For pregnant patients, it is covered up to 3 times per pregnancy  Immunizations:  Immunization Recommendations   Influenza Vaccine Annual influenza vaccination during flu season is recommended for all persons aged >= 6 months who do not have contraindications   Pneumococcal Vaccine (Prevnar and Pneumovax)  * Prevnar = PCV13  * Pneumovax = PPSV23   Adults 25-60 years old: 1-3 doses may be recommended based on certain risk factors  Adults 72 years old: Prevnar (PCV13) vaccine recommended followed by Pneumovax (PPSV23) vaccine  If already received PPSV23 since turning 65, then PCV13 recommended at least one year after PPSV23 dose  Hepatitis B Vaccine 3 dose series if at intermediate or high risk (ex: diabetes, end stage renal disease, liver disease)   Tetanus (Td) Vaccine - COST NOT COVERED BY MEDICARE PART B Following completion of primary series, a booster dose should be given every 10 years to maintain immunity against tetanus  Td may also be given as tetanus wound prophylaxis  Tdap Vaccine - COST NOT COVERED BY MEDICARE PART B Recommended at least once for all adults  For pregnant patients, recommended with each pregnancy  Shingles Vaccine (Shingrix) - COST NOT COVERED BY MEDICARE PART B  2 shot series recommended in those aged 48 and above     Health Maintenance Due:      Topic Date Due    HIV Screening  05/06/1970    DXA SCAN  09/26/2019    MAMMOGRAM  05/26/2021    Colonoscopy Surveillance  08/03/2022    Colorectal Cancer Screening  08/03/2027    Hepatitis C Screening  Completed     Immunizations Due:      Topic Date Due    DTaP,Tdap,and Td Vaccines (1 - Tdap) 05/06/1976     Advance Directives   What are advance directives? Advance directives are legal documents that state your wishes and plans for medical care  These plans are made ahead of time in case you lose your ability to make decisions for yourself   Advance directives can apply to any medical decision, such as the treatments you want, and if you want to donate organs  What are the types of advance directives? There are many types of advance directives, and each state has rules about how to use them  You may choose a combination of any of the following:  · Living will: This is a written record of the treatment you want  You can also choose which treatments you do not want, which to limit, and which to stop at a certain time  This includes surgery, medicine, IV fluid, and tube feedings  · Durable power of  for healthcare [facility]): This is a written record that states who you want to make healthcare choices for you when you are unable to make them for yourself  This person, called a proxy, is usually a family member or a friend  You may choose more than 1 proxy  · Do not resuscitate (DNR) order:  A DNR order is used in case your heart stops beating or you stop breathing  It is a request not to have certain forms of treatment, such as CPR  A DNR order may be included in other types of advance directives  · Medical directive: This covers the care that you want if you are in a coma, near death, or unable to make decisions for yourself  You can list the treatments you want for each condition  Treatment may include pain medicine, surgery, blood transfusions, dialysis, IV or tube feedings, and a ventilator (breathing machine)  · Values history: This document has questions about your views, beliefs, and how you feel and think about life  This information can help others choose the care that you would choose  Why are advance directives important? An advance directive helps you control your care  Although spoken wishes may be used, it is better to have your wishes written down  Spoken wishes can be misunderstood, or not followed  Treatments may be given even if you do not want them  An advance directive may make it easier for your family to make difficult choices about your care     Urinary Incontinence   Urinary incontinence (UI)  is when you lose control of your bladder  UI develops because your bladder cannot store or empty urine properly  The 3 most common types of UI are stress incontinence, urge incontinence, or both  Medicines:   · May be given to help strengthen your bladder control  Report any side effects of medication to your healthcare provider  Do pelvic muscle exercises often:  Your pelvic muscles help you stop urinating  Squeeze these muscles tight for 5 seconds, then relax for 5 seconds  Gradually work up to squeezing for 10 seconds  Do 3 sets of 15 repetitions a day, or as directed  This will help strengthen your pelvic muscles and improve bladder control  Train your bladder:  Go to the bathroom at set times, such as every 2 hours, even if you do not feel the urge to go  You can also try to hold your urine when you feel the urge to go  For example, hold your urine for 5 minutes when you feel the urge to go  As that becomes easier, hold your urine for 10 minutes  Self-care:   · Keep a UI record  Write down how often you leak urine and how much you leak  Make a note of what you were doing when you leaked urine  · Drink liquids as directed  You may need to limit the amount of liquid you drink to help control your urine leakage  Do not drink any liquid right before you go to bed  Limit or do not have drinks that contain caffeine or alcohol  · Prevent constipation  Eat a variety of high-fiber foods  Good examples are high-fiber cereals, beans, vegetables, and whole-grain breads  Walking is the best way to trigger your intestines to have a bowel movement  · Exercise regularly and maintain a healthy weight  Weight loss and exercise will decrease pressure on your bladder and help you control your leakage  · Use a catheter as directed  to help empty your bladder  A catheter is a tiny, plastic tube that is put into your bladder to drain your urine  · Go to behavior therapy as directed    Behavior therapy may be used to help you learn to control your urge to urinate  Weight Management   Why it is important to manage your weight:  Being overweight increases your risk of health conditions such as heart disease, high blood pressure, type 2 diabetes, and certain types of cancer  It can also increase your risk for osteoarthritis, sleep apnea, and other respiratory problems  Aim for a slow, steady weight loss  Even a small amount of weight loss can lower your risk of health problems  How to lose weight safely:  A safe and healthy way to lose weight is to eat fewer calories and get regular exercise  You can lose up about 1 pound a week by decreasing the number of calories you eat by 500 calories each day  Healthy meal plan for weight management:  A healthy meal plan includes a variety of foods, contains fewer calories, and helps you stay healthy  A healthy meal plan includes the following:  · Eat whole-grain foods more often  A healthy meal plan should contain fiber  Fiber is the part of grains, fruits, and vegetables that is not broken down by your body  Whole-grain foods are healthy and provide extra fiber in your diet  Some examples of whole-grain foods are whole-wheat breads and pastas, oatmeal, brown rice, and bulgur  · Eat a variety of vegetables every day  Include dark, leafy greens such as spinach, kale, ant greens, and mustard greens  Eat yellow and orange vegetables such as carrots, sweet potatoes, and winter squash  · Eat a variety of fruits every day  Choose fresh or canned fruit (canned in its own juice or light syrup) instead of juice  Fruit juice has very little or no fiber  · Eat low-fat dairy foods  Drink fat-free (skim) milk or 1% milk  Eat fat-free yogurt and low-fat cottage cheese  Try low-fat cheeses such as mozzarella and other reduced-fat cheeses  · Choose meat and other protein foods that are low in fat  Choose beans or other legumes such as split peas or lentils   Choose fish, skinless poultry (chicken or turkey), or lean cuts of red meat (beef or pork)  Before you cook meat or poultry, cut off any visible fat  · Use less fat and oil  Try baking foods instead of frying them  Add less fat, such as margarine, sour cream, regular salad dressing and mayonnaise to foods  Eat fewer high-fat foods  Some examples of high-fat foods include french fries, doughnuts, ice cream, and cakes  · Eat fewer sweets  Limit foods and drinks that are high in sugar  This includes candy, cookies, regular soda, and sweetened drinks  Exercise:  Exercise at least 30 minutes per day on most days of the week  Some examples of exercise include walking, biking, dancing, and swimming  You can also fit in more physical activity by taking the stairs instead of the elevator or parking farther away from stores  Ask your healthcare provider about the best exercise plan for you  © Copyright [employer] 2018 Information is for End User's use only and may not be sold, redistributed or otherwise used for commercial purposes   All illustrations and images included in CareNotes® are the copyrighted property of [employer]  or [address] [place] [address]

## 2021-01-13 NOTE — PROGRESS NOTES
Assessment/Plan:       Problem List Items Addressed This Visit        Digestive    Other hemorrhoids     She has an external hemorrhoid that has what sounds like a purulent discharge intermittently  I am going to have her see Colorectal surgery  Relevant Orders    Ambulatory referral to Colorectal Surgery       Endocrine    Type 2 diabetes mellitus with hyperglycemia, without long-term current use of insulin ([facility] )       Lab Results   Component Value Date    HGBA1C 6 3 (H) 11/12/2020   Check labs prior to follow-up in 3 months  Cardiovascular and Mediastinum    Essential hypertension     Well controlled this time  Continue current regimen  Other    Pure hypercholesterolemia     Continue current regimen  Class 3 severe obesity due to excess calories with serious comorbidity and body mass index (BMI) of 45 0 to 49 9 in adult [facility])    Vitamin D deficiency     Check D levels prior to follow-up         Insomnia due to medical condition     She is under lot of stress due to the current COVID environment but seems to be managing  Other Visit Diagnoses     Medicare annual wellness visit, subsequent    -  Primary    Morbid obesity with BMI of 50 0-59 9, adult ([facility] [address] )        Post-menopausal        Screening for HIV (human immunodeficiency virus)        Relevant Orders    HIV 1/2 Antigen/Antibody (4th Generation) w Reflex [facility]    Viral upper respiratory tract infection        Encounter for screening mammogram for malignant neoplasm of breast        Relevant Orders    Mammo diagnostic bilateral w cad            Subjective:      Patient ID: Sky Ortiz is a 72 y o  female  HPI patient presents today for follow-up for chronic health issues  Overall, she remains pretty stable  Diabetic control is good  She is unable to exercise due to some chronic diffuse pain  She does note a lot of muscle aches towards the end of each day    She takes Naprosyn b i d  chronically but would like to try something else for her chronic pain  She has history of hypertension and denies chest pain, shortness of breath or palpitations  The following portions of the patient's history were reviewed and updated as appropriate: allergies, current medications, past family history, past medical history, past social history, past surgical history and problem list       Current Outpatient Medications:     Cholecalciferol (VITAMIN D) 2000 units tablet, Take 2,000 Units by mouth daily, Disp: , Rfl:     ciprofloxacin (CIPRO) 250 mg tablet, Every 6 months before bladder injections, Disp: , Rfl:     FARXIGA 5 MG TABS, TAKE 1 TABLET EVERY MORNING, Disp: 90 tablet, Rfl: 4    fexofenadine (ALLEGRA) 180 MG tablet, Take 180 mg by mouth daily, Disp: , Rfl:     fluticasone (FLONASE) 50 mcg/act nasal spray, 1 spray into each nostril daily, Disp: , Rfl:     gabapentin (NEURONTIN) 300 mg capsule, TAKE 1 CAPSULE IN THE MORNING, 1 CAPSULE AT LUNCH AND 2 CAPSULES AT BEDTIME (Patient taking differently: No sig reported), Disp: 360 capsule, Rfl: 3    metFORMIN (GLUCOPHAGE-XR) 500 mg 24 hr tablet, Take 2 tablets (1,000 mg total) by mouth daily with breakfast, Disp: 180 tablet, Rfl: 3    naproxen sodium (ALEVE) 220 MG tablet, Take 2 tabs every morning  Take 2 tabs in the evening as needed  , Disp: , Rfl:     nystatin (MYCOSTATIN) powder, Apply 1 application topically 2 (two) times a day As directed, Disp: 60 g, Rfl: 5    quinapril (ACCUPRIL) 40 MG tablet, TAKE 1 TABLET DAILY, Disp: 90 tablet, Rfl: 3    traZODone (DESYREL) 50 mg tablet, Take 1 tablet (50 mg total) by mouth daily at bedtime as needed for sleep, Disp: 90 tablet, Rfl: 3     Review of Systems   Constitutional: Negative for appetite change, chills, fatigue, fever and unexpected weight change  HENT: Negative for trouble swallowing  Eyes: Negative for visual disturbance  Respiratory: Negative for cough, chest tightness, shortness of breath and wheezing  Cardiovascular: Negative for chest pain, palpitations and leg swelling  Gastrointestinal: Negative for abdominal distention, abdominal pain, blood in stool, constipation and diarrhea  Endocrine: Negative for polyuria  Genitourinary: Negative for difficulty urinating and flank pain  Musculoskeletal: Positive for back pain and gait problem  Negative for arthralgias and myalgias  Skin: Negative for rash  Neurological: Negative for dizziness and light-headedness  Hematological: Negative for adenopathy  Does not bruise/bleed easily  Psychiatric/Behavioral: Negative for dysphoric mood and sleep disturbance  The patient is not nervous/anxious  Objective:      /82 (BP Location: Left arm, Patient Position: Sitting, Cuff Size: Large)   Pulse 80   Temp 97 6 °F (36 4 °C) (Temporal)   Resp 18   Ht 5' 5" (1 651 m)   Wt (!) 138 kg (304 lb)   SpO2 95%   BMI 50 59 kg/m²          Physical Exam  Constitutional:       General: She is not in acute distress  Appearance: She is well-developed  She is obese  She is not diaphoretic  HENT:      Head: Normocephalic  Eyes:      General:         Right eye: No discharge  Left eye: No discharge  Pupils: Pupils are equal, round, and reactive to light  Neck:      Thyroid: No thyromegaly  Trachea: No tracheal deviation  Cardiovascular:      Rate and Rhythm: Normal rate and regular rhythm  Heart sounds: Normal heart sounds  No murmur  Pulmonary:      Effort: Pulmonary effort is normal  No respiratory distress  Breath sounds: No wheezing or rales  Abdominal:      General: There is no distension  Palpations: Abdomen is soft  Tenderness: There is no abdominal tenderness  Musculoskeletal: Normal range of motion  Right lower leg: Edema present  Left lower leg: Edema (Chronic trace lower extremity edema) present        Comments: She wears a large brace on her right ankle   Lymphadenopathy: Cervical: No cervical adenopathy  Skin:     General: Skin is warm  Findings: No erythema  Neurological:      Mental Status: She is alert and oriented to person, place, and time  Cranial Nerves: No cranial nerve deficit  Psychiatric:         Thought Content:  Thought content normal          Judgment: Judgment normal            Daniel Castro MD

## 2021-02-24 DIAGNOSIS — M79.7 FIBROMYALGIA: ICD-10-CM

## 2021-02-24 RX ORDER — GABAPENTIN 300 MG/1
CAPSULE ORAL
Qty: 360 CAPSULE | Refills: 3 | Status: SHIPPED | OUTPATIENT
Start: 2021-02-24 | End: 2022-04-01

## 2021-03-10 DIAGNOSIS — Z23 ENCOUNTER FOR IMMUNIZATION: ICD-10-CM

## 2021-04-09 ENCOUNTER — RA CDI HCC (OUTPATIENT)
Dept: OTHER | Facility: HOSPITAL | Age: 66
End: 2021-04-09

## 2021-04-09 NOTE — PROGRESS NOTES
[facility]  coding oppertunities          Chart reviewed, no opportunity found: CHART REVIEWED, NO OPPORTUNITY FOUND

## 2021-04-14 ENCOUNTER — APPOINTMENT (OUTPATIENT)
Dept: LAB | Facility: IMAGING CENTER | Age: 66
End: 2021-04-14
Payer: COMMERCIAL

## 2021-04-14 DIAGNOSIS — E66.01 OBESITY, CLASS III, BMI 40-49.9 (MORBID OBESITY) (HCC): ICD-10-CM

## 2021-04-14 DIAGNOSIS — Z11.4 SCREENING FOR HIV (HUMAN IMMUNODEFICIENCY VIRUS): ICD-10-CM

## 2021-04-14 DIAGNOSIS — E11.9 TYPE 2 DIABETES MELLITUS WITHOUT COMPLICATION, WITHOUT LONG-TERM CURRENT USE OF INSULIN (HCC): ICD-10-CM

## 2021-04-14 DIAGNOSIS — E55.9 VITAMIN D DEFICIENCY: ICD-10-CM

## 2021-04-14 DIAGNOSIS — M17.11 PRIMARY OSTEOARTHRITIS OF RIGHT KNEE: ICD-10-CM

## 2021-04-14 DIAGNOSIS — E11.65 TYPE 2 DIABETES MELLITUS WITH HYPERGLYCEMIA, WITHOUT LONG-TERM CURRENT USE OF INSULIN (HCC): ICD-10-CM

## 2021-04-14 LAB
25(OH)D3 SERPL-MCNC: 41.4 NG/ML (ref 30–100)
ALBUMIN SERPL BCP-MCNC: 3.9 G/DL (ref 3.5–5)
ALP SERPL-CCNC: 56 U/L (ref 46–116)
ALT SERPL W P-5'-P-CCNC: 30 U/L (ref 12–78)
ANION GAP SERPL CALCULATED.3IONS-SCNC: 5 MMOL/L (ref 4–13)
AST SERPL W P-5'-P-CCNC: 24 U/L (ref 5–45)
BASOPHILS # BLD AUTO: 0.07 THOUSANDS/ΜL (ref 0–0.1)
BASOPHILS NFR BLD AUTO: 1 % (ref 0–1)
BILIRUB SERPL-MCNC: 0.56 MG/DL (ref 0.2–1)
BUN SERPL-MCNC: 11 MG/DL (ref 5–25)
CALCIUM SERPL-MCNC: 9.9 MG/DL (ref 8.3–10.1)
CHLORIDE SERPL-SCNC: 106 MMOL/L (ref 100–108)
CHOLEST SERPL-MCNC: 233 MG/DL (ref 50–200)
CO2 SERPL-SCNC: 28 MMOL/L (ref 21–32)
CREAT SERPL-MCNC: 0.74 MG/DL (ref 0.6–1.3)
EOSINOPHIL # BLD AUTO: 0.21 THOUSAND/ΜL (ref 0–0.61)
EOSINOPHIL NFR BLD AUTO: 3 % (ref 0–6)
ERYTHROCYTE [DISTWIDTH] IN BLOOD BY AUTOMATED COUNT: 12.9 % (ref 11.6–15.1)
EST. AVERAGE GLUCOSE BLD GHB EST-MCNC: 137 MG/DL
GFR SERPL CREATININE-BSD FRML MDRD: 85 ML/MIN/1.73SQ M
GLUCOSE P FAST SERPL-MCNC: 148 MG/DL (ref 65–99)
HBA1C MFR BLD: 6.4 %
HCT VFR BLD AUTO: 48.2 % (ref 34.8–46.1)
HDLC SERPL-MCNC: 59 MG/DL
HGB BLD-MCNC: 15.3 G/DL (ref 11.5–15.4)
IMM GRANULOCYTES # BLD AUTO: 0.02 THOUSAND/UL (ref 0–0.2)
IMM GRANULOCYTES NFR BLD AUTO: 0 % (ref 0–2)
LDLC SERPL CALC-MCNC: 137 MG/DL (ref 0–100)
LYMPHOCYTES # BLD AUTO: 1.88 THOUSANDS/ΜL (ref 0.6–4.47)
LYMPHOCYTES NFR BLD AUTO: 28 % (ref 14–44)
MCH RBC QN AUTO: 29.8 PG (ref 26.8–34.3)
MCHC RBC AUTO-ENTMCNC: 31.7 G/DL (ref 31.4–37.4)
MCV RBC AUTO: 94 FL (ref 82–98)
MONOCYTES # BLD AUTO: 0.5 THOUSAND/ΜL (ref 0.17–1.22)
MONOCYTES NFR BLD AUTO: 8 % (ref 4–12)
NEUTROPHILS # BLD AUTO: 3.99 THOUSANDS/ΜL (ref 1.85–7.62)
NEUTS SEG NFR BLD AUTO: 60 % (ref 43–75)
NRBC BLD AUTO-RTO: 0 /100 WBCS
PLATELET # BLD AUTO: 306 THOUSANDS/UL (ref 149–390)
PMV BLD AUTO: 11.1 FL (ref 8.9–12.7)
POTASSIUM SERPL-SCNC: 4.7 MMOL/L (ref 3.5–5.3)
PROT SERPL-MCNC: 7.4 G/DL (ref 6.4–8.2)
RBC # BLD AUTO: 5.13 MILLION/UL (ref 3.81–5.12)
SODIUM SERPL-SCNC: 139 MMOL/L (ref 136–145)
TRIGL SERPL-MCNC: 185 MG/DL
WBC # BLD AUTO: 6.67 THOUSAND/UL (ref 4.31–10.16)

## 2021-04-14 PROCEDURE — 83036 HEMOGLOBIN GLYCOSYLATED A1C: CPT

## 2021-04-14 PROCEDURE — 36415 COLL VENOUS BLD VENIPUNCTURE: CPT

## 2021-04-14 PROCEDURE — 85025 COMPLETE CBC W/AUTO DIFF WBC: CPT

## 2021-04-14 PROCEDURE — 87389 HIV-1 AG W/HIV-1&-2 AB AG IA: CPT

## 2021-04-14 PROCEDURE — 80053 COMPREHEN METABOLIC PANEL: CPT

## 2021-04-14 PROCEDURE — 3044F HG A1C LEVEL LT 7.0%: CPT | Performed by: FAMILY MEDICINE

## 2021-04-14 PROCEDURE — 82306 VITAMIN D 25 HYDROXY: CPT

## 2021-04-14 PROCEDURE — 80061 LIPID PANEL: CPT

## 2021-04-15 LAB — HIV 1+2 AB+HIV1 P24 AG SERPL QL IA: NORMAL

## 2021-04-16 ENCOUNTER — OFFICE VISIT (OUTPATIENT)
Dept: FAMILY MEDICINE CLINIC | Facility: CLINIC | Age: 66
End: 2021-04-16
Payer: COMMERCIAL

## 2021-04-16 VITALS
SYSTOLIC BLOOD PRESSURE: 142 MMHG | DIASTOLIC BLOOD PRESSURE: 81 MMHG | OXYGEN SATURATION: 97 % | HEART RATE: 102 BPM | WEIGHT: 293 LBS | HEIGHT: 65 IN | TEMPERATURE: 97.6 F | BODY MASS INDEX: 48.82 KG/M2

## 2021-04-16 DIAGNOSIS — E78.00 PURE HYPERCHOLESTEROLEMIA: ICD-10-CM

## 2021-04-16 DIAGNOSIS — I10 ESSENTIAL HYPERTENSION: ICD-10-CM

## 2021-04-16 DIAGNOSIS — E55.9 VITAMIN D DEFICIENCY: ICD-10-CM

## 2021-04-16 DIAGNOSIS — E11.9 TYPE 2 DIABETES MELLITUS WITHOUT COMPLICATION, WITHOUT LONG-TERM CURRENT USE OF INSULIN (HCC): ICD-10-CM

## 2021-04-16 DIAGNOSIS — G47.01 INSOMNIA DUE TO MEDICAL CONDITION: ICD-10-CM

## 2021-04-16 DIAGNOSIS — E11.65 TYPE 2 DIABETES MELLITUS WITH HYPERGLYCEMIA, WITHOUT LONG-TERM CURRENT USE OF INSULIN (HCC): Primary | ICD-10-CM

## 2021-04-16 DIAGNOSIS — E66.01 CLASS 3 SEVERE OBESITY DUE TO EXCESS CALORIES WITH SERIOUS COMORBIDITY AND BODY MASS INDEX (BMI) OF 45.0 TO 49.9 IN ADULT (HCC): ICD-10-CM

## 2021-04-16 PROCEDURE — 1036F TOBACCO NON-USER: CPT | Performed by: FAMILY MEDICINE

## 2021-04-16 PROCEDURE — 3077F SYST BP >= 140 MM HG: CPT | Performed by: FAMILY MEDICINE

## 2021-04-16 PROCEDURE — 99214 OFFICE O/P EST MOD 30 MIN: CPT | Performed by: FAMILY MEDICINE

## 2021-04-16 PROCEDURE — 3008F BODY MASS INDEX DOCD: CPT | Performed by: FAMILY MEDICINE

## 2021-04-16 PROCEDURE — 3725F SCREEN DEPRESSION PERFORMED: CPT | Performed by: FAMILY MEDICINE

## 2021-04-16 PROCEDURE — 1160F RVW MEDS BY RX/DR IN RCRD: CPT | Performed by: FAMILY MEDICINE

## 2021-04-16 PROCEDURE — 3079F DIAST BP 80-89 MM HG: CPT | Performed by: FAMILY MEDICINE

## 2021-04-16 RX ORDER — ROSUVASTATIN CALCIUM 10 MG/1
10 TABLET, COATED ORAL DAILY
Qty: 90 TABLET | Refills: 3 | Status: SHIPPED | OUTPATIENT
Start: 2021-04-16 | End: 2022-02-28

## 2021-04-16 NOTE — PROGRESS NOTES
Assessment/Plan:       Problem List Items Addressed This Visit        Endocrine    Type 2 diabetes mellitus with hyperglycemia, without long-term current use of insulin ([facility] [address] ) - Primary    Relevant Orders    Hemoglobin A1C       Cardiovascular and Mediastinum    Essential hypertension    Relevant Orders    CBC and differential    Lipid Panel with Direct LDL reflex       Other    Pure hypercholesterolemia    Relevant Medications    rosuvastatin (CRESTOR) 10 MG tablet    Other Relevant Orders    Comprehensive metabolic panel    Lipid Panel with Direct LDL reflex    Class 3 severe obesity due to excess calories with serious comorbidity and body mass index (BMI) of 45 0 to 49 9 in adult [facility])    Vitamin D deficiency          Diabetic Foot Exam    Patient's shoes and socks removed  Right Foot/Ankle   Right Foot Inspection  Skin Exam: skin normal and skin intact no dry skin, no warmth, no callus, no erythema, no maceration, no abnormal color, no pre-ulcer, no ulcer and no callus                          Toe Exam: ROM and strength within normal limitsno swelling, no tenderness and erythema  Sensory       Monofilament testing: intact  Vascular  Capillary refills: < 3 seconds  The right DP pulse is 2+  The right PT pulse is 2+  Left Foot/Ankle  Left Foot Inspection  Skin Exam: skin normal and skin intactno dry skin, no warmth, no erythema, no maceration, normal color, no pre-ulcer, no ulcer and no callus                         Toe Exam: ROM and strength within normal limitsno swelling, no tenderness, no erythema and no left toe deformity                   Sensory       Monofilament: intact  Vascular  Capillary refills: < 3 seconds  The left DP pulse is 2+  The left PT pulse is 2+  Assign Risk Category:  No deformity present; No loss of protective sensation; No weak pulses       Risk: 0   BMI Counseling: Body mass index is 52 09 kg/m²   The BMI is above normal  Nutrition recommendations include encouraging healthy choices of fruits and vegetables  Exercise recommendations include moderate physical activity 150 minutes/week  Subjective:      Patient ID: Jack Bailey is a 72 y o  female  HPI The patient presents today for follow-up for diabetes  Fortunately, the A1c is well controlled  There have been no significant episodes of hypo or hyperglycemia  The patient is not experiencing any blurry vision, polyuria, polydipsia, or peripheral neuropathy symptoms  Patient remains compliant with medications and routine follow-up  The patient presents today for a hypertension follow-up  Patient remains compliant with medications and denies any chest pain, shortness of breath, palpitations, lightheadedness or diaphoresis  She has history of hyperlipidemia tolerate statin therapy without significant myalgias  Her insomnia is pretty well controlled with trazodone at this time  She has IBS but denies any severe cramping at this time  She has a history of morbid obesity and has difficulty with exercise due to her significant valgus deformities of her feet      The following portions of the patient's history were reviewed and updated as appropriate: allergies, current medications, past family history, past medical history, past social history, past surgical history and problem list       Current Outpatient Medications:     Cholecalciferol (VITAMIN D) 2000 units tablet, Take 2,000 Units by mouth daily, Disp: , Rfl:     ciprofloxacin (CIPRO) 250 mg tablet, Every 6 months before bladder injections, Disp: , Rfl:     FARXIGA 5 MG TABS, TAKE 1 TABLET EVERY MORNING, Disp: 90 tablet, Rfl: 4    fexofenadine (ALLEGRA) 180 MG tablet, Take 180 mg by mouth daily, Disp: , Rfl:     fluticasone (FLONASE) 50 mcg/act nasal spray, 1 spray into each nostril daily, Disp: , Rfl:     gabapentin (NEURONTIN) 300 mg capsule, TAKE 1 CAPSULE IN THE MORNING, 1 CAPSULE AT LUNCH AND 2 CAPSULES AT BEDTIME, Disp: 360 capsule, Rfl: 3    metFORMIN (GLUCOPHAGE-XR) 500 mg 24 hr tablet, Take 2 tablets (1,000 mg total) by mouth daily with breakfast, Disp: 180 tablet, Rfl: 3    naproxen sodium (ALEVE) 220 MG tablet, Take 2 tabs every morning  Take 2 tabs in the evening as needed  , Disp: , Rfl:     nystatin (MYCOSTATIN) powder, Apply 1 application topically 2 (two) times a day As directed, Disp: 60 g, Rfl: 5    quinapril (ACCUPRIL) 40 MG tablet, TAKE 1 TABLET DAILY, Disp: 90 tablet, Rfl: 3    traZODone (DESYREL) 50 mg tablet, Take 1 tablet (50 mg total) by mouth daily at bedtime as needed for sleep, Disp: 90 tablet, Rfl: 3    rosuvastatin (CRESTOR) 10 MG tablet, Take 1 tablet (10 mg total) by mouth daily, Disp: 90 tablet, Rfl: 3     Review of Systems   Constitutional: Negative for appetite change, chills, fatigue, fever and unexpected weight change  HENT: Negative for trouble swallowing  Eyes: Negative for visual disturbance  Respiratory: Negative for cough, chest tightness, shortness of breath and wheezing  Cardiovascular: Negative for chest pain, palpitations and leg swelling  Gastrointestinal: Negative for abdominal distention, abdominal pain, blood in stool, constipation and diarrhea  Endocrine: Negative for polyuria  Genitourinary: Negative for difficulty urinating and flank pain  Musculoskeletal: Positive for arthralgias, back pain and gait problem  Negative for myalgias  Skin: Negative for rash  Neurological: Negative for dizziness and light-headedness  Hematological: Negative for adenopathy  Does not bruise/bleed easily  Psychiatric/Behavioral: Negative for dysphoric mood and sleep disturbance  The patient is not nervous/anxious  Objective:      /81 (BP Location: Left arm, Patient Position: Sitting, Cuff Size: Adult)   Pulse 102   Temp 97 6 °F (36 4 °C) (Temporal)   Ht 5' 5" (1 651 m)   Wt (!) 142 kg (313 lb)   SpO2 97%   BMI 52 09 kg/m²          Physical Exam  Vitals signs reviewed     Constitutional: General: She is not in acute distress  Appearance: She is well-developed  She is obese  She is not diaphoretic  HENT:      Head: Normocephalic  Eyes:      General:         Right eye: No discharge  Left eye: No discharge  Pupils: Pupils are equal, round, and reactive to light  Neck:      Thyroid: No thyromegaly  Trachea: No tracheal deviation  Cardiovascular:      Rate and Rhythm: Normal rate and regular rhythm  Pulses: no weak pulses          Dorsalis pedis pulses are 2+ on the right side and 2+ on the left side  Posterior tibial pulses are 2+ on the right side and 2+ on the left side  Heart sounds: Normal heart sounds  No murmur  Pulmonary:      Effort: Pulmonary effort is normal  No respiratory distress  Breath sounds: No wheezing or rales  Abdominal:      General: There is no distension  Palpations: Abdomen is soft  Tenderness: There is no abdominal tenderness  Musculoskeletal:         General: Deformity (She has valgus deformities of bilateral ankles and wears a brace on her right leg) present  Right lower leg: No edema  Left lower leg: No edema  Feet:      Right foot:      Skin integrity: No ulcer, skin breakdown, erythema, warmth, callus or dry skin  Left foot:      Skin integrity: No ulcer, skin breakdown, erythema, warmth, callus or dry skin  Lymphadenopathy:      Cervical: No cervical adenopathy  Skin:     General: Skin is warm  Findings: No erythema  Neurological:      Mental Status: She is alert and oriented to person, place, and time  Cranial Nerves: No cranial nerve deficit  Psychiatric:         Thought Content:  Thought content normal          Judgment: Judgment normal            Anniece Skiff, MD

## 2021-04-19 RX ORDER — DAPAGLIFLOZIN 5 MG/1
TABLET, FILM COATED ORAL
Qty: 90 TABLET | Refills: 3 | Status: SHIPPED | OUTPATIENT
Start: 2021-04-19 | End: 2021-09-22 | Stop reason: SINTOL

## 2021-04-19 RX ORDER — TRAZODONE HYDROCHLORIDE 50 MG/1
TABLET ORAL
Qty: 90 TABLET | Refills: 3 | Status: SHIPPED | OUTPATIENT
Start: 2021-04-19 | End: 2022-02-28

## 2021-04-20 DIAGNOSIS — I10 ESSENTIAL HYPERTENSION: ICD-10-CM

## 2021-04-20 PROCEDURE — 4010F ACE/ARB THERAPY RXD/TAKEN: CPT | Performed by: FAMILY MEDICINE

## 2021-04-20 RX ORDER — QUINAPRIL 40 MG/1
40 TABLET ORAL DAILY
Qty: 90 TABLET | Refills: 3 | Status: SHIPPED | OUTPATIENT
Start: 2021-04-20 | End: 2022-04-01

## 2021-04-30 DIAGNOSIS — B37.9 CANDIDIASIS: ICD-10-CM

## 2021-04-30 RX ORDER — NYSTATIN 100000 [USP'U]/G
1 POWDER TOPICAL 2 TIMES DAILY
Qty: 60 G | Refills: 5 | Status: SHIPPED | OUTPATIENT
Start: 2021-04-30

## 2021-05-24 ENCOUNTER — TRANSCRIBE ORDERS (OUTPATIENT)
Dept: ADMINISTRATIVE | Facility: HOSPITAL | Age: 66
End: 2021-05-24

## 2021-05-24 DIAGNOSIS — E11.9 TYPE 2 DIABETES MELLITUS WITHOUT COMPLICATION, WITHOUT LONG-TERM CURRENT USE OF INSULIN (HCC): ICD-10-CM

## 2021-05-24 DIAGNOSIS — N39.0 URINARY TRACT INFECTION WITHOUT HEMATURIA, SITE UNSPECIFIED: ICD-10-CM

## 2021-05-24 DIAGNOSIS — Z12.31 ENCOUNTER FOR SCREENING MAMMOGRAM FOR MALIGNANT NEOPLASM OF BREAST: ICD-10-CM

## 2021-05-24 RX ORDER — METFORMIN HYDROCHLORIDE 500 MG/1
TABLET, EXTENDED RELEASE ORAL
Qty: 180 TABLET | Refills: 3 | Status: SHIPPED | OUTPATIENT
Start: 2021-05-24 | End: 2021-09-22 | Stop reason: DRUGHIGH

## 2021-05-27 ENCOUNTER — HOSPITAL ENCOUNTER (OUTPATIENT)
Dept: RADIOLOGY | Facility: IMAGING CENTER | Age: 66
Discharge: HOME | End: 2021-05-27
Payer: COMMERCIAL

## 2021-05-27 VITALS — BODY MASS INDEX: 47.09 KG/M2 | HEIGHT: 66 IN | WEIGHT: 293 LBS

## 2021-05-27 DIAGNOSIS — Z12.31 ENCOUNTER FOR SCREENING MAMMOGRAM FOR MALIGNANT NEOPLASM OF BREAST: ICD-10-CM

## 2021-05-27 PROCEDURE — 77067 SCR MAMMO BI INCL CAD: CPT

## 2021-06-17 ENCOUNTER — APPOINTMENT (OUTPATIENT)
Dept: LAB | Facility: IMAGING CENTER | Age: 66
End: 2021-06-17
Payer: COMMERCIAL

## 2021-06-17 DIAGNOSIS — N39.0 URINARY TRACT INFECTION WITHOUT HEMATURIA, SITE UNSPECIFIED: ICD-10-CM

## 2021-06-17 PROCEDURE — 87086 URINE CULTURE/COLONY COUNT: CPT

## 2021-06-18 LAB — BACTERIA UR CULT: NORMAL

## 2021-07-02 ENCOUNTER — APPOINTMENT (OUTPATIENT)
Dept: LAB | Facility: IMAGING CENTER | Age: 66
End: 2021-07-02
Payer: COMMERCIAL

## 2021-07-02 DIAGNOSIS — N39.0 URINARY TRACT INFECTION WITHOUT HEMATURIA, SITE UNSPECIFIED: ICD-10-CM

## 2021-07-02 DIAGNOSIS — R35.0 URINARY FREQUENCY: ICD-10-CM

## 2021-07-02 PROCEDURE — 87086 URINE CULTURE/COLONY COUNT: CPT

## 2021-07-03 LAB — BACTERIA UR CULT: NORMAL

## 2021-07-16 DIAGNOSIS — E11.65 TYPE 2 DIABETES MELLITUS WITH HYPERGLYCEMIA, WITHOUT LONG-TERM CURRENT USE OF INSULIN (HCC): Primary | ICD-10-CM

## 2021-07-16 NOTE — PROGRESS NOTES
Patient is having polyuria as well as yeast infections likely secondary to [place]  I am going to have her stop it at this time  She will get back to me and let me know if this is resolving her symptoms  I am also going to have our clinical pharmacist follow-up with her

## 2021-08-04 ENCOUNTER — DOCUMENTATION (OUTPATIENT)
Dept: FAMILY MEDICINE CLINIC | Facility: CLINIC | Age: 66
End: 2021-08-04

## 2021-08-04 DIAGNOSIS — E11.65 TYPE 2 DIABETES MELLITUS WITH HYPERGLYCEMIA, WITHOUT LONG-TERM CURRENT USE OF INSULIN (HCC): Primary | ICD-10-CM

## 2021-08-04 NOTE — PROGRESS NOTES
Cathleen Mann    Communication with patient: Yes, Phone call with patient    Reason for documentation: call placed to set up appt  Patient does not currently check BG but believes she has SMBG equipment  Would be willing to check BG to see how SMBG readings have been since stopping [place]; was on [place] from 1/2018-7/2019  Lab Results   Component Value Date    HGBA1C 6 4 (H) 04/14/2021    HGBA1C 6 3 (H) 11/12/2020    HGBA1C 6 0 (H) 07/07/2020    HGBA1C 6 3 12/31/2019    HGBA1C 6 6 (H) 07/08/2019    HGBA1C 7 4 (H) 01/11/2019    HGBA1C 6 8 (H) 09/11/2018    HGBA1C 6 9 (H) 04/23/2018    HGBA1C 7 3 (H) 12/11/2017       Noted patient is due for updated A1c, however will not be fully reflective of individual metformin as patient has only been off of [place] for a few weeks  Will hold off on updating A1c for now; would anticipate A1c would remain to goal without [place]  No compelling indication to continue SGLT2i vs other DM medication class if SMBG readings are above goal      Follow-up:patient will send [employer] message to PharmD in 2 weeks with FBG readings  Demographics  Interaction Method: Phone  Type of Intervention: New    Topic(s) Addressed  Diabetes    Intervention(s) Made      Non-Pharmacologic:      Other: SMBG    Tool(s) Used  Not Applicable    Time Spent:   Time Spent in Direct Patient Care: 5 minutes    Time Spent in Care Coordination: 10 minutes    Recommendation(s) Accepted by the Patient/Caregiver:  All Accepted

## 2021-08-19 ENCOUNTER — DOCUMENTATION (OUTPATIENT)
Dept: FAMILY MEDICINE CLINIC | Facility: CLINIC | Age: 66
End: 2021-08-19

## 2021-08-19 ENCOUNTER — PATIENT MESSAGE (OUTPATIENT)
Dept: INTERNAL MEDICINE CLINIC | Facility: CLINIC | Age: 66
End: 2021-08-19

## 2021-08-19 DIAGNOSIS — E11.65 TYPE 2 DIABETES MELLITUS WITH HYPERGLYCEMIA, WITHOUT LONG-TERM CURRENT USE OF INSULIN (HCC): Primary | ICD-10-CM

## 2021-08-19 RX ORDER — BLOOD SUGAR DIAGNOSTIC
1 STRIP MISCELLANEOUS DAILY
Qty: 100 EACH | Refills: 1 | Status: SHIPPED | OUTPATIENT
Start: 2021-08-19

## 2021-08-19 RX ORDER — BLOOD-GLUCOSE METER
1 KIT MISCELLANEOUS ONCE
Qty: 1 KIT | Refills: 0 | Status: SHIPPED | OUTPATIENT
Start: 2021-08-19 | End: 2021-08-19

## 2021-08-19 RX ORDER — LANCETS 33 GAUGE
1 EACH MISCELLANEOUS DAILY
Qty: 100 EACH | Refills: 1 | Status: SHIPPED | OUTPATIENT
Start: 2021-08-19

## 2021-08-19 NOTE — PROGRESS NOTES
Cathleen Mann    Communication with patient: Yes, [employer] message with patient    Reason for documentation: duplicate encounter for tracking purposes, See [employer] Messages for information  Demographics  Interaction Method: Virtual  Type of Intervention: Follow-Up    Topic(s) Addressed  Diabetes    Intervention(s) Made    Pharmacologic:      Medication Adjustment - Dose or Frequency: Metformin 2    Tool(s) Used  Not Applicable    Time Spent:   Time Spent in Direct Patient Care: 10 minutes    Time Spent in Care Coordination: 10 minutes    Recommendation(s) Accepted by the Patient/Caregiver:  All Accepted

## 2021-09-09 ENCOUNTER — TELEPHONE (OUTPATIENT)
Dept: FAMILY MEDICINE CLINIC | Facility: CLINIC | Age: 66
End: 2021-09-09

## 2021-09-09 DIAGNOSIS — Z23 FLU VACCINE NEED: Primary | ICD-10-CM

## 2021-09-17 ENCOUNTER — CLINICAL SUPPORT (OUTPATIENT)
Dept: FAMILY MEDICINE CLINIC | Facility: CLINIC | Age: 66
End: 2021-09-17
Payer: COMMERCIAL

## 2021-09-17 DIAGNOSIS — Z23 NEED FOR INFLUENZA VACCINATION: Primary | ICD-10-CM

## 2021-09-17 PROCEDURE — G0008 ADMIN INFLUENZA VIRUS VAC: HCPCS

## 2021-09-17 PROCEDURE — 90662 IIV NO PRSV INCREASED AG IM: CPT

## 2021-09-22 ENCOUNTER — CLINICAL SUPPORT (OUTPATIENT)
Dept: FAMILY MEDICINE CLINIC | Facility: CLINIC | Age: 66
End: 2021-09-22

## 2021-09-22 ENCOUNTER — PATIENT MESSAGE (OUTPATIENT)
Dept: INTERNAL MEDICINE CLINIC | Facility: CLINIC | Age: 66
End: 2021-09-22

## 2021-09-22 ENCOUNTER — APPOINTMENT (OUTPATIENT)
Dept: LAB | Facility: IMAGING CENTER | Age: 66
End: 2021-09-22
Payer: COMMERCIAL

## 2021-09-22 DIAGNOSIS — E11.65 TYPE 2 DIABETES MELLITUS WITH HYPERGLYCEMIA, WITHOUT LONG-TERM CURRENT USE OF INSULIN (HCC): Primary | ICD-10-CM

## 2021-09-22 DIAGNOSIS — E11.65 TYPE 2 DIABETES MELLITUS WITH HYPERGLYCEMIA, WITHOUT LONG-TERM CURRENT USE OF INSULIN (HCC): ICD-10-CM

## 2021-09-22 DIAGNOSIS — I10 ESSENTIAL HYPERTENSION: ICD-10-CM

## 2021-09-22 DIAGNOSIS — E78.00 PURE HYPERCHOLESTEROLEMIA: ICD-10-CM

## 2021-09-22 LAB
EST. AVERAGE GLUCOSE BLD GHB EST-MCNC: 154 MG/DL
HBA1C MFR BLD: 7 %

## 2021-09-22 PROCEDURE — 83036 HEMOGLOBIN GLYCOSYLATED A1C: CPT

## 2021-09-22 PROCEDURE — 36415 COLL VENOUS BLD VENIPUNCTURE: CPT

## 2021-09-22 RX ORDER — METFORMIN HYDROCHLORIDE 500 MG/1
2000 TABLET, EXTENDED RELEASE ORAL DAILY
Qty: 360 TABLET | Refills: 1 | Status: SHIPPED | OUTPATIENT
Start: 2021-09-22 | End: 2022-01-07

## 2021-09-22 NOTE — PROGRESS NOTES
[place], Pharmacist    Reason for visit: Appointment with 77y o  year old for management of T2DM  This virtual check-in was done via phone  Encounter provider: Deward Carrel, Pharmacist    Patient agrees to participate in a virtual check in via telephone or video visit instead of presenting to the office to address urgent/immediate medical needs  After connecting through telephone, the patient was identified by name and date of birth  Lynnette Shanks was informed that this was a telemedicine visit and that the exam was being conducted confidentially over secure lines  My office door was closed  No one else was in the room  Lynnette Shanks acknowledged consent and understanding of privacy and security of the telemedicine visit  I informed the patient that I have reviewed She record in Epic and presented the opportunity for She to ask any questions regarding the visit today  The patient agreed to participate  Was in [place] during appointment     Current DM Regimen:   Metformin     ASSESSMENT/PLAN                                                                                     1  Type 2 diabetes: goal A1c <6 5% based on AACE/ACE guidelines  Most recent A1c above goal but not reflective of current treatment as patient was on [address]  FBG readings above goal since stopping [address]  May benefit from GLP1 and further metformin titration  Could consider Pro CGM   BMP shows hyperglycemia; patient was fasting       Duration: > 10 years  Microvascular complications: none  Macrovascular complications: none  (No) Aspirin (Yes ) Statin (Yes ) ACEI/ARB  Eye Exam:    Lab Results   Component Value Date    LEFTDIABRET None 09/01/2021    RIGHTDIABRET None 09/01/2021     Foot Exam: 4/2021       Most recent labs and diabetes goals discussed with patient in clinic today   MEDICATIONS: If PCP is in agreeance, will increase metformin to 2gm/day and start Trulicity  o Will pend rx for PCP signature/concurrence  o Orders placed under CPA   SMBG: once daily  o Would like to hold off on Pro CGM for now   TLCs: Re-enforced the importance of a Diabetic Diet, exercise 30 minutes 5 days a week as tolerated, weight loss/control  Medication Reconciliation: Medication list reviewed with pt at today's visit  Discrepancies as discussed below   Medication list updated to reflect medications pt is currently taking    Follow-Up: 1 month  _x_ Home Monitoring Records, BG      SUBJECTIVE                                                                                                              1  Medication Adherence: Medication list reviewed with patient, reports the following discrepancies/problems:   ciprofloxacin   Farxiga: no longer on   fexofenadine   fluticasone   gabapentin   MetFORMIN: taking 1gm/day, tolerating well  Reports GI issues with 2gm/day of IR formulation but willing to try higher dose of extended release formulation    Misses doses:  No    Previously on Trulicity, unable to recall tolerability issues  2  Medication Efficacy:    Review of Systems   Gastrointestinal: Negative for constipation, diarrhea, nausea and vomiting  3  Lifestyle: has been working on dietary changes and trying to lose weight  Has been eating more veggies and less cho (ie pasta, bread) but has only lost 2#, would like to lose more weight        Social History     Tobacco Use   Smoking Status Never Smoker   Smokeless Tobacco Never Used     Social History     Substance and Sexual Activity   Alcohol Use Yes    Comment: socail;  5 drinks weekly (as per [employer])          OBJECTIVE                                                                                                      SMBG readings: see [employer] message with FBG      Previous DM medications trialed:   Trulicity: discontinue 3986 unclear reason   Farxiga: discontinue 2021 due to adverse drug reaction Pertinent Lab Data:   Patient was fasting for most recent labs  Lab Results   Component Value Date    SODIUM 139 04/14/2021    K 4 7 04/14/2021    EGFR 85 04/14/2021    CREATININE 0 74 04/14/2021    GLUF 148 (H) 04/14/2021    MICROALBCRE 18 12/14/2020       Lab Results   Component Value Date    HGBA1C 6 4 (H) 04/14/2021    HGBA1C 6 3 (H) 11/12/2020    HGBA1C 6 0 (H) 07/07/2020         Demographics  Interaction Method: Phone  Type of Intervention: Follow-Up    Topic(s) Addressed  Diabetes  CGM    Intervention(s) Made    Pharmacologic:  Medication Initiation: Trulicity 6 88    Medication Adjustment - Dose or Frequency: Metformin 2    Non-Pharmacologic:      Lab Ordered: a1c    Other    Tool(s) Used  Not Applicable    Time Spent:   Time Spent in Direct Patient Care: 15 minutes    Time Spent in Care Coordination: 15 minutes    Recommendation(s) Accepted by the Patient/Caregiver: Partially Accepted

## 2021-09-22 NOTE — TELEPHONE ENCOUNTER
Per pharmacist encounter on 09/22/21, pending orders for signature/concurrence from MD Mildred Jones

## 2021-11-10 ENCOUNTER — CLINICAL SUPPORT (OUTPATIENT)
Dept: FAMILY MEDICINE CLINIC | Facility: CLINIC | Age: 66
End: 2021-11-10

## 2021-11-10 DIAGNOSIS — E11.65 TYPE 2 DIABETES MELLITUS WITH HYPERGLYCEMIA, WITHOUT LONG-TERM CURRENT USE OF INSULIN (HCC): Primary | ICD-10-CM

## 2021-11-29 ENCOUNTER — VBI (OUTPATIENT)
Dept: ADMINISTRATIVE | Facility: OTHER | Age: 66
End: 2021-11-29

## 2021-12-15 ENCOUNTER — PATIENT MESSAGE (OUTPATIENT)
Dept: FAMILY MEDICINE CLINIC | Facility: CLINIC | Age: 66
End: 2021-12-15

## 2021-12-30 ENCOUNTER — APPOINTMENT (OUTPATIENT)
Dept: LAB | Facility: IMAGING CENTER | Age: 66
End: 2021-12-30
Payer: COMMERCIAL

## 2021-12-30 LAB
ALBUMIN SERPL BCP-MCNC: 3.8 G/DL (ref 3.5–5)
ALP SERPL-CCNC: 52 U/L (ref 46–116)
ALT SERPL W P-5'-P-CCNC: 28 U/L (ref 12–78)
ANION GAP SERPL CALCULATED.3IONS-SCNC: 5 MMOL/L (ref 4–13)
AST SERPL W P-5'-P-CCNC: 23 U/L (ref 5–45)
BASOPHILS # BLD AUTO: 0.06 THOUSANDS/ΜL (ref 0–0.1)
BASOPHILS NFR BLD AUTO: 1 % (ref 0–1)
BILIRUB SERPL-MCNC: 0.63 MG/DL (ref 0.2–1)
BUN SERPL-MCNC: 15 MG/DL (ref 5–25)
CALCIUM SERPL-MCNC: 9.3 MG/DL (ref 8.3–10.1)
CHLORIDE SERPL-SCNC: 105 MMOL/L (ref 100–108)
CHOLEST SERPL-MCNC: 160 MG/DL
CO2 SERPL-SCNC: 28 MMOL/L (ref 21–32)
CREAT SERPL-MCNC: 0.73 MG/DL (ref 0.6–1.3)
EOSINOPHIL # BLD AUTO: 0.24 THOUSAND/ΜL (ref 0–0.61)
EOSINOPHIL NFR BLD AUTO: 3 % (ref 0–6)
ERYTHROCYTE [DISTWIDTH] IN BLOOD BY AUTOMATED COUNT: 13.2 % (ref 11.6–15.1)
EST. AVERAGE GLUCOSE BLD GHB EST-MCNC: 140 MG/DL
GFR SERPL CREATININE-BSD FRML MDRD: 86 ML/MIN/1.73SQ M
GLUCOSE P FAST SERPL-MCNC: 154 MG/DL (ref 65–99)
HBA1C MFR BLD: 6.5 %
HCT VFR BLD AUTO: 43.5 % (ref 34.8–46.1)
HDLC SERPL-MCNC: 71 MG/DL
HGB BLD-MCNC: 13.9 G/DL (ref 11.5–15.4)
IMM GRANULOCYTES # BLD AUTO: 0.03 THOUSAND/UL (ref 0–0.2)
IMM GRANULOCYTES NFR BLD AUTO: 0 % (ref 0–2)
LDLC SERPL CALC-MCNC: 52 MG/DL (ref 0–100)
LYMPHOCYTES # BLD AUTO: 1.88 THOUSANDS/ΜL (ref 0.6–4.47)
LYMPHOCYTES NFR BLD AUTO: 21 % (ref 14–44)
MCH RBC QN AUTO: 30.2 PG (ref 26.8–34.3)
MCHC RBC AUTO-ENTMCNC: 32 G/DL (ref 31.4–37.4)
MCV RBC AUTO: 95 FL (ref 82–98)
MONOCYTES # BLD AUTO: 0.57 THOUSAND/ΜL (ref 0.17–1.22)
MONOCYTES NFR BLD AUTO: 6 % (ref 4–12)
NEUTROPHILS # BLD AUTO: 6.1 THOUSANDS/ΜL (ref 1.85–7.62)
NEUTS SEG NFR BLD AUTO: 69 % (ref 43–75)
NRBC BLD AUTO-RTO: 0 /100 WBCS
PLATELET # BLD AUTO: 258 THOUSANDS/UL (ref 149–390)
PMV BLD AUTO: 11.3 FL (ref 8.9–12.7)
POTASSIUM SERPL-SCNC: 4.5 MMOL/L (ref 3.5–5.3)
PROT SERPL-MCNC: 6.9 G/DL (ref 6.4–8.2)
RBC # BLD AUTO: 4.6 MILLION/UL (ref 3.81–5.12)
SODIUM SERPL-SCNC: 138 MMOL/L (ref 136–145)
TRIGL SERPL-MCNC: 186 MG/DL
WBC # BLD AUTO: 8.88 THOUSAND/UL (ref 4.31–10.16)

## 2021-12-30 PROCEDURE — 83036 HEMOGLOBIN GLYCOSYLATED A1C: CPT

## 2021-12-30 PROCEDURE — 85025 COMPLETE CBC W/AUTO DIFF WBC: CPT

## 2021-12-30 PROCEDURE — 36415 COLL VENOUS BLD VENIPUNCTURE: CPT

## 2021-12-30 PROCEDURE — 80053 COMPREHEN METABOLIC PANEL: CPT

## 2021-12-30 PROCEDURE — 80061 LIPID PANEL: CPT

## 2022-01-07 ENCOUNTER — OFFICE VISIT (OUTPATIENT)
Dept: FAMILY MEDICINE CLINIC | Facility: CLINIC | Age: 67
End: 2022-01-07
Payer: COMMERCIAL

## 2022-01-07 VITALS
BODY MASS INDEX: 47.09 KG/M2 | SYSTOLIC BLOOD PRESSURE: 122 MMHG | RESPIRATION RATE: 18 BRPM | DIASTOLIC BLOOD PRESSURE: 80 MMHG | HEART RATE: 88 BPM | HEIGHT: 66 IN | OXYGEN SATURATION: 95 % | WEIGHT: 293 LBS

## 2022-01-07 DIAGNOSIS — M77.11 LATERAL EPICONDYLITIS OF RIGHT ELBOW: ICD-10-CM

## 2022-01-07 DIAGNOSIS — E11.65 TYPE 2 DIABETES MELLITUS WITH HYPERGLYCEMIA, WITHOUT LONG-TERM CURRENT USE OF INSULIN (HCC): ICD-10-CM

## 2022-01-07 DIAGNOSIS — E78.00 PURE HYPERCHOLESTEROLEMIA: ICD-10-CM

## 2022-01-07 DIAGNOSIS — Z00.00 MEDICARE ANNUAL WELLNESS VISIT, SUBSEQUENT: Primary | ICD-10-CM

## 2022-01-07 DIAGNOSIS — Z12.31 ENCOUNTER FOR SCREENING MAMMOGRAM FOR BREAST CANCER: ICD-10-CM

## 2022-01-07 DIAGNOSIS — E55.9 VITAMIN D DEFICIENCY: ICD-10-CM

## 2022-01-07 DIAGNOSIS — E66.01 CLASS 3 SEVERE OBESITY DUE TO EXCESS CALORIES WITH SERIOUS COMORBIDITY AND BODY MASS INDEX (BMI) OF 45.0 TO 49.9 IN ADULT (HCC): ICD-10-CM

## 2022-01-07 DIAGNOSIS — I10 ESSENTIAL HYPERTENSION: ICD-10-CM

## 2022-01-07 DIAGNOSIS — M19.071 PRIMARY LOCALIZED OSTEOARTHROSIS OF RIGHT ANKLE AND FOOT: ICD-10-CM

## 2022-01-07 DIAGNOSIS — K21.9 GASTROESOPHAGEAL REFLUX DISEASE WITHOUT ESOPHAGITIS: ICD-10-CM

## 2022-01-07 PROCEDURE — 1160F RVW MEDS BY RX/DR IN RCRD: CPT | Performed by: FAMILY MEDICINE

## 2022-01-07 PROCEDURE — 3725F SCREEN DEPRESSION PERFORMED: CPT | Performed by: FAMILY MEDICINE

## 2022-01-07 PROCEDURE — 1036F TOBACCO NON-USER: CPT | Performed by: FAMILY MEDICINE

## 2022-01-07 PROCEDURE — 3288F FALL RISK ASSESSMENT DOCD: CPT | Performed by: FAMILY MEDICINE

## 2022-01-07 PROCEDURE — G0439 PPPS, SUBSEQ VISIT: HCPCS | Performed by: FAMILY MEDICINE

## 2022-01-07 PROCEDURE — 3079F DIAST BP 80-89 MM HG: CPT | Performed by: FAMILY MEDICINE

## 2022-01-07 PROCEDURE — 1170F FXNL STATUS ASSESSED: CPT | Performed by: FAMILY MEDICINE

## 2022-01-07 PROCEDURE — 1125F AMNT PAIN NOTED PAIN PRSNT: CPT | Performed by: FAMILY MEDICINE

## 2022-01-07 PROCEDURE — 3074F SYST BP LT 130 MM HG: CPT | Performed by: FAMILY MEDICINE

## 2022-01-07 PROCEDURE — 3008F BODY MASS INDEX DOCD: CPT | Performed by: FAMILY MEDICINE

## 2022-01-07 PROCEDURE — 99214 OFFICE O/P EST MOD 30 MIN: CPT | Performed by: FAMILY MEDICINE

## 2022-01-07 RX ORDER — METFORMIN HYDROCHLORIDE 500 MG/1
2000 TABLET, EXTENDED RELEASE ORAL DAILY
Qty: 360 TABLET | Refills: 1
Start: 2022-01-07 | End: 2022-04-01

## 2022-01-07 NOTE — PROGRESS NOTES
Assessment/Plan:       Problem List Items Addressed This Visit        Digestive    Gastroesophageal reflux disease without esophagitis     She is having some intermittent reflux  She can try Pepcid AC as needed  Consider PPI if this is not improving            Endocrine    Type 2 diabetes mellitus with hyperglycemia, without long-term current use of insulin (HCC)    Relevant Medications    metFORMIN (GLUCOPHAGE-XR) 500 mg 24 hr tablet    Other Relevant Orders    Hemoglobin A1C    Comprehensive metabolic panel    Hemoglobin A1C    Comprehensive metabolic panel    Microalbumin / creatinine urine ratio       Cardiovascular and Mediastinum    Essential hypertension    Relevant Orders    Lipid Panel with Direct LDL reflex       Musculoskeletal and Integument    Primary localized osteoarthrosis of right ankle and foot    Lateral epicondylitis of right elbow     I gave her some exercises to perform  Consider physical therapy she is not improving  Other    Pure hypercholesterolemia    Class 3 severe obesity due to excess calories with serious comorbidity and body mass index (BMI) of 45 0 to 49 9 in [facility])    Vitamin D deficiency      Other Visit Diagnoses     Medicare annual wellness visit, subsequent    -  Primary    Encounter for screening mammogram for breast cancer        Relevant Orders    Mammo screening bilateral w cad            Subjective:      Patient ID: Jack Bailey is a 77 y o  female  HPI patient presents today for follow-up for chronic health issues  She is noting some increased pain in her right elbow  This is the arm she uses her cane with  She denies any acute injury  She has some chronic pain her posterior leg likely secondary to bracing of her right ankle  Her other health issues seem to be stable  She stop taking Trulicity due to some persistent nausea    She has been able to lose about 18 lb which she is quite happy about and her diabetic control has improved and states stable off Trulicity  Her other health issues seem to be pretty stable  The following portions of the patient's history were reviewed and updated as appropriate: allergies, current medications, past family history, past medical history, past social history, past surgical history and problem list       Current Outpatient Medications:     Cholecalciferol (VITAMIN D) 2000 units tablet, Take 2,000 Units by mouth daily, Disp: , Rfl:     ciprofloxacin (CIPRO) 250 mg tablet, Every 6 months before bladder injections, Disp: , Rfl:     fexofenadine (ALLEGRA) 180 MG tablet, Take 180 mg by mouth daily, Disp: , Rfl:     fluticasone (FLONASE) 50 mcg/act nasal spray, 1 spray into each nostril daily, Disp: , Rfl:     gabapentin (NEURONTIN) 300 mg capsule, TAKE 1 CAPSULE IN THE MORNING, 1 CAPSULE AT LUNCH AND 2 CAPSULES AT BEDTIME, Disp: 360 capsule, Rfl: 3    glucose blood (OneTouch Verio) test strip, Use 1 each daily Use as instructed to check blood sugar -  - or as preferred by insurance coverage, Disp: 100 each, Rfl: 1    Lancets (OneTouch Delica Plus [id number]) MISC, Use 1 each daily To check blood sugar  - or as preferred by insurance coverage, Disp: 100 each, Rfl: 1    metFORMIN (GLUCOPHAGE-XR) 500 mg 24 hr tablet, Take 4 tablets (2,000 mg total) by mouth daily For diabetes, Disp: 360 tablet, Rfl: 1    naproxen sodium (ALEVE) 220 MG tablet, Take 2 tabs every morning  Take 2 tabs in the evening as needed  , Disp: , Rfl:     nystatin (MYCOSTATIN) powder, APPLY 1 APPLICATION TOPICALLY 2 (TWO) TIMES A DAY AS DIRECTED, Disp: 60 g, Rfl: 5    quinapril (ACCUPRIL) 40 MG tablet, Take 1 tablet (40 mg total) by mouth daily, Disp: 90 tablet, Rfl: 3    rosuvastatin (CRESTOR) 10 MG tablet, Take 1 tablet (10 mg total) by mouth daily, Disp: 90 tablet, Rfl: 3    traZODone (DESYREL) 50 mg tablet, TAKE 1 TABLET DAILY AT BEDTIME AS NEEDED FOR SLEEP, Disp: 90 tablet, Rfl: 3     Review of Systems      Objective:      /80 Pulse 88   Resp 18   Ht 5' 6" (1 676 m)   Wt (!) 137 kg (301 lb)   SpO2 95%   BMI 48 58 kg/m²          Physical Exam  Vitals reviewed  Constitutional:       Appearance: She is well-developed  She is obese  HENT:      Head: Normocephalic  Cardiovascular:      Rate and Rhythm: Regular rhythm  Heart sounds: Normal heart sounds  No murmur heard  Pulmonary:      Effort: No respiratory distress  Breath sounds: No wheezing or rales  Abdominal:      General: There is no distension  Tenderness: There is no abdominal tenderness  Musculoskeletal:         General: Tenderness (She has pretty significant pain over her right lateral epicondyle ) and deformity ( right ankle is chronically braced ) present  No swelling  Right lower leg: Edema present  Left lower leg: Edema (  Trace edema bilateral lower extremity) present  Skin:     Findings: No erythema or rash  Neurological:      Mental Status: She is alert and oriented to person, place, and time             Damaris Rodriguez MD

## 2022-01-07 NOTE — PROGRESS NOTES
Assessment and Plan:     Problem List Items Addressed This Visit        Endocrine    Type 2 diabetes mellitus with hyperglycemia, without long-term current use of insulin (HCC)       Other    Class 3 severe obesity due to excess calories with serious comorbidity and body mass index (BMI) of 45 0 to 49 9 in adult [facility])      Other Visit Diagnoses     Medicare annual wellness visit, subsequent    -  Primary    Encounter for screening mammogram for breast cancer        Relevant Orders    Mammo screening bilateral w cad        BMI Counseling: Body mass index is 48 58 kg/m²  The BMI is above normal  Nutrition recommendations include encouraging healthy choices of fruits and vegetables  Exercise recommendations include moderate physical activity 150 minutes/week  Rationale for BMI follow-up plan is due to patient being overweight or obese  Depression Screening and Follow-up Plan: Patient was screened for depression during today's encounter  They screened negative with a PHQ-2 score of 0  Patient presents today for Medicare wellness visit  Overall health is stable  She is up-to-date on vaccines except for tetanus and shingles and I explained that these can be obtained at the pharmacy  She screens negative for depression or cognitive decline  Preventive health issues were discussed with patient, and age appropriate screening tests were ordered as noted in patient's After Visit Summary  Personalized health advice and appropriate referrals for health education or preventive services given if needed, as noted in patient's After Visit Summary       History of Present Illness:     Patient presents for Medicare Annual Wellness visit    Patient Care Team:  Kurt Dang MD as PCP - General  MD Charly Garcia MD (Urogynecology)  Sharlett Sacks, DPM (Podiatry)  Susan Mays MD as [address] [place] [address] (Ophthalmology)  Cathleen Bailey as Pharmacist (Pharmacy)     Problem List:     Patient Active Problem List   Diagnosis    Type 2 diabetes mellitus with hyperglycemia, without long-term current use of insulin ([facility] )    Fibromyalgia    Neurologic gait dysfunction    Pure hypercholesterolemia    Essential hypertension    Irritable bowel syndrome    Lower back pain    Class 3 severe obesity due to excess calories with serious comorbidity and body mass index (BMI) of 45 0 to 49 9 in adult ([facility] )    Rosacea    Urinary incontinence    Vitamin D deficiency    Congenital valgus deformity of foot    Primary localized osteoarthrosis of ankle and foot    Osteoarthritis of hip    Primary osteoarthritis of right knee    Tibialis tendinitis    History of total hip replacement    Insomnia due to medical condition    Other hemorrhoids      Past Medical and Surgical History:     Past Medical History:   Diagnosis Date    Anemia     last assessed: 2015    Dysfunction of eustachian tube     unspecified laterality; last assessed: 2013    Endometriosis     Fibromyalgia     Hyperlipidemia     IBS (irritable bowel syndrome)     Vitamin D deficiency      Past Surgical History:   Procedure Laterality Date    ANKLE SURGERY      , , -extensive surgery     SECTION      x2    JOINT REPLACEMENT      hip    LUMBAR EPIDURAL INJECTION  12/2005    x 1    GA COLONOSCOPY FLX DX W/COLLJ SPEC WHEN PFRMD N/A 8/3/2017    Procedure: COLONOSCOPY with polypectomy;  Surgeon: Lida Bustos MD;  Location: [facility];   Service: Gastroenterology    TOTAL HIP ARTHROPLASTY Left 2013      Family History:     Family History   Problem Relation Age of Onset    Other Mother         CABG    Other Father         gangrene    No Known Problems Sister     No Known Problems Daughter     No Known Problems Maternal Aunt     No Known Problems Maternal Aunt     No Known Problems Maternal Aunt     No Known Problems Maternal Aunt     No Known Problems Maternal Grandmother     No Known Problems Maternal Grandfather     No Known Problems Paternal Grandmother     No Known Problems Paternal Grandfather     Colon cancer Neg Hx       Social History:     Social History     Socioeconomic History    Marital status: /Civil Union     Spouse name: None    Number of children: None    Years of education: None    Highest education level: None   Occupational History    None   Tobacco Use    Smoking status: Never Smoker    Smokeless tobacco: Never Used   Substance and Sexual Activity    Alcohol use: Yes     Comment: socail;  5 drinks weekly (as per [employer])    Drug use: No    Sexual activity: Yes     Partners: Male   Other Topics Concern    None   Social History Narrative    None     Social Determinants of Health     Financial Resource Strain: Not on file   Food Insecurity: Not on file   Transportation Needs: Not on file   Physical Activity: Not on file   Stress: Not on file   Social Connections: Not on file   Intimate Partner Violence: Not on file   Housing Stability: Not on file      Medications and Allergies:     Current Outpatient Medications   Medication Sig Dispense Refill    Cholecalciferol (VITAMIN D) 2000 units tablet Take 2,000 Units by mouth daily      ciprofloxacin (CIPRO) 250 mg tablet Every 6 months before bladder injections      fexofenadine (ALLEGRA) 180 MG tablet Take 180 mg by mouth daily      fluticasone (FLONASE) 50 mcg/act nasal spray 1 spray into each nostril daily      gabapentin (NEURONTIN) 300 mg capsule TAKE 1 CAPSULE IN THE MORNING, 1 CAPSULE AT LUNCH AND 2 CAPSULES AT BEDTIME 360 capsule 3    glucose blood (OneTouch Verio) test strip Use 1 each daily Use as instructed to check blood sugar -  - or as preferred by insurance coverage 100 each 1    Lancets (OneTouch Delica Plus [id number]) MISC Use 1 each daily To check blood sugar  - or as preferred by insurance coverage 100 each 1    metFORMIN (GLUCOPHAGE-XR) 500 mg 24 hr tablet Take 4 tablets (2,000 mg total) by mouth daily For diabetes (Patient taking differently: Take 1,000 mg by mouth daily For diabetes ) 360 tablet 1    naproxen sodium (ALEVE) 220 MG tablet Take 2 tabs every morning  Take 2 tabs in the evening as needed   nystatin (MYCOSTATIN) powder APPLY 1 APPLICATION TOPICALLY 2 (TWO) TIMES A DAY AS DIRECTED 60 g 5    quinapril (ACCUPRIL) 40 MG tablet Take 1 tablet (40 mg total) by mouth daily 90 tablet 3    rosuvastatin (CRESTOR) 10 MG tablet Take 1 tablet (10 mg total) by mouth daily 90 tablet 3    traZODone (DESYREL) 50 mg tablet TAKE 1 TABLET DAILY AT BEDTIME AS NEEDED FOR SLEEP 90 tablet 3    Dulaglutide 0 75 MG/0 5ML SOPN Inject 0 5 mL (0 75 mg total) under the skin once a week See provider for refills, will adjust dose (Patient not taking: Reported on 1/7/2022 ) 2 mL 5     No current facility-administered medications for this visit       Allergies   Allergen Reactions    Augmentin [Amoxicillin-Pot Clavulanate] Nausea Only     Category: nausea,diarrhea;     Azithromycin Nausea Only    Doxycycline GI Intolerance    Erythromycin GI Intolerance    Lodine [Etodolac]      Category: rash;     Lorazepam      [facility] - [id number]: mental status changes    Sulfa Antibiotics      Category: rash;       Immunizations:     Immunization History   Administered Date(s) Administered    COVID-19 MODERNA VACC 0 5 ML IM 02/09/2021, 03/09/2021, 10/23/2021    INFLUENZA 10/14/2009, 09/23/2015, 01/05/2017, 12/20/2017    Influenza Quadrivalent Preservative Free 3 years and older IM 12/23/2014, 01/05/2017    Influenza, high dose seasonal 0 7 mL 11/18/2020, 09/17/2021    Influenza, recombinant, quadrivalent,injectable, preservative free 09/14/2018, 11/05/2019    Influenza, seasonal, injectable 09/30/2011, 10/09/2012    Pneumococcal Polysaccharide PPV23 12/15/2010, 11/18/2020    Td (adult), adsorbed 05/10/2002      Health Maintenance:         Topic Date Due    DXA SCAN  09/26/2019    Breast Cancer Screening: Mammogram  05/27/2022    Colorectal Cancer Screening  08/03/2022    Hepatitis C Screening  Completed         Topic Date Due    DTaP,Tdap,and Td Vaccines (1 - Tdap) 05/11/2002      Medicare Health Risk Assessment:     /80   Pulse 88   Resp 18   Ht 5' 6" (1 676 m)   Wt (!) 137 kg (301 lb)   SpO2 95%   BMI 48 58 kg/m²      Jacqueline Polanco is here for her Subsequent Wellness visit  Health Risk Assessment:   Patient rates overall health as fair  Patient feels that their physical health rating is same  Patient is dissatisfied with their life  Eyesight was rated as same  Hearing was rated as same  Patient feels that their emotional and mental health rating is same  Patients states they are sometimes angry  Patient states they are sometimes unusually tired/fatigued  Pain experienced in the last 7 days has been some  Patient's pain rating has been 3/10  Patient states that she has experienced no weight loss or gain in last 6 months  Depression Screening:   PHQ-2 Score: 0      Fall Risk Screening: In the past year, patient has experienced: no history of falling in past year      Urinary Incontinence Screening:   Patient has not leaked urine accidently in the last six months  Home Safety:  Patient has trouble with stairs inside or outside of their home  Patient has working smoke alarms and has working carbon monoxide detector  Home safety hazards include: none  Nutrition:   Current diet is Low Carb and Limited junk food  Medications:   Patient is currently taking over-the-counter supplements  OTC medications include: Vitamin D  Patient is able to manage medications  Activities of Daily Living (ADLs)/Instrumental Activities of Daily Living (IADLs):   Walk and transfer into and out of bed and chair?: Yes  Dress and groom yourself?: Yes    Bathe or shower yourself?: Yes    Feed yourself?  Yes  Do your laundry/housekeeping?: Yes  Manage your money, pay your bills and track your expenses?: Yes  Make your own meals?: Yes    Do your own shopping?: Yes    Previous Hospitalizations:   Any hospitalizations or ED visits within the last 12 months?: No      Advance Care Planning:   Living will: Yes    Durable POA for healthcare: No    Advanced directive: No    End of Life Decisions reviewed with patient: Yes      Cognitive Screening:   Provider or family/friend/caregiver concerned regarding cognition?: No    PREVENTIVE SCREENINGS      Cardiovascular Screening:    General: Screening Not Indicated and History Lipid Disorder      Diabetes Screening:     General: Screening Not Indicated and History Diabetes      Colorectal Cancer Screening:     General: Screening Current      Breast Cancer Screening:     General: Screening Current      Cervical Cancer Screening:    General: Screening Not Indicated      Osteoporosis Screening:    General: Screening Not Indicated      Abdominal Aortic Aneurysm (AAA) Screening:        General: Screening Not Indicated      Lung Cancer Screening:     General: Screening Not Indicated      Hepatitis C Screening:    General: Screening Current    Screening, Brief Intervention, and Referral to Treatment (SBIRT)    Screening  Typical number of drinks in a day: 2  Typical number of drinks in a week: 6  Interpretation: Low risk drinking behavior  AUDIT-C Screenin) How often did you have a drink containing alcohol in the past year? 2 to 3 times a week  2) How many drinks did you have on a typical day when you were drinking in the past year? 1 to 2  3) How often did you have 6 or more drinks on one occasion in the past year? never    AUDIT-C Score: 3  Interpretation: Score 3-12 (female): POSITIVE screen for alcohol misuse    AUDIT Screenin) How often during the last year have you found that you were not able to stop drinking once you had started?  0 - never  5) How often during the last year have you failed to do what was normally expected from you because of drinking? 0 - never  6) How often during the last year have you needed a first drink in the morning to get yourself going after a heavy drinking session?  0 - never  7) How often during the last year have you had a feeling of guilt or remorse after drinking? 0 - never  8) How often during the last year have you been unable to remember what happened the night before because you had been drinking? 0 - never  9) Have you or someone else been injured as a result of your drinking? 0 - no  10) Has a relative or friend or a doctor or another health worker been concerned about your drinking or suggested you cut down? 0 - no    AUDIT Score: 3  Interpretation: Low risk alcohol consumption    Single Item Drug Screening:  How often have you used an illegal drug (including marijuana) or a prescription medication for non-medical reasons in the past year? never    Single Item Drug Screen Score: 0  Interpretation: Negative screen for possible drug use disorder      Opal Rodriguez MD This document is complete and the patient is ready for discharge.

## 2022-01-07 NOTE — PATIENT INSTRUCTIONS
Tennis Elbow Exercises   AMBULATORY CARE:   Tennis elbow exercises  help decrease pain in your elbow, forearm, wrist, and hand  They also help strengthen your arm muscles and prevent further injury  Start these exercises slowly  Do the exercises on both arms  Stop if you feel pain  · Finger extensions:  Hold your fingers close together  Put a rubber band around the outside of your fingers and thumb  Spread your fingers apart and then slowly bring them together without letting the rubber band fall off  Repeat 40 times  · Wrist flexor stretch:  Hold your arm straight out in front of you with your palm facing down  Use your other hand to grasp your fingers  Keep your elbow straight and slowly bend your hand back  Your fingertips should point up and your palm should face away from you  Do this until you feel a stretch in the top of your wrist  Hold for 10 seconds  Repeat 5 times  · Wrist extensor stretch: This stretch is the opposite of the wrist flexor stretch  Hold your arm straight out in front of you with your palm facing down  Use your other hand to grasp your fingers  Keep your elbow straight and slowly bend your hand down  Your fingertips should point down and your palm should face you  Do this until you feel a stretch in your wrist  Hold for 10 seconds  Repeat 5 times  · Bicep curls:  Place your hand under your injured elbow for support  Turn your palm so that it faces up and hold a weight in your hand  Ask your healthcare provider how much weight you should use  Slowly bend and straighten your elbow 30 times  · :  Hold a soft rubber ball or tennis ball in your hand  Squeeze the ball as hard as you can and hold this position  Ask your healthcare provider how long to hold this position  Repeat this exercise as directed by your healthcare provider  Contact your healthcare provider if:   · You have increased pain or weakness in your arm, wrist, hand, or fingers      · You have new numbness or tingling in your arm, hand, or fingers  · You have questions or concerns about tennis elbow exercises  © Copyright [employer] 2021 Information is for End User's use only and may not be sold, redistributed or otherwise used for commercial purposes  All illustrations and images included in CareNotes® are the copyrighted property of A D A M , Inc  or [facility] Elsie Garcia   The above information is an  only  It is not intended as medical advice for individual conditions or treatments  Talk to your doctor, nurse or pharmacist before following any medical regimen to see if it is safe and effective for you

## 2022-01-07 NOTE — ASSESSMENT & PLAN NOTE
She is having some intermittent reflux  She can try Pepcid AC as needed    Consider PPI if this is not improving

## 2022-01-10 ENCOUNTER — VBI (OUTPATIENT)
Dept: ADMINISTRATIVE | Facility: OTHER | Age: 67
End: 2022-01-10

## 2022-02-28 DIAGNOSIS — G47.01 INSOMNIA DUE TO MEDICAL CONDITION: ICD-10-CM

## 2022-02-28 DIAGNOSIS — E78.00 PURE HYPERCHOLESTEROLEMIA: ICD-10-CM

## 2022-02-28 RX ORDER — ROSUVASTATIN CALCIUM 10 MG/1
TABLET, COATED ORAL
Qty: 90 TABLET | Refills: 3 | Status: SHIPPED | OUTPATIENT
Start: 2022-02-28

## 2022-02-28 RX ORDER — TRAZODONE HYDROCHLORIDE 50 MG/1
TABLET ORAL
Qty: 90 TABLET | Refills: 3 | Status: SHIPPED | OUTPATIENT
Start: 2022-02-28

## 2022-03-31 DIAGNOSIS — E11.65 TYPE 2 DIABETES MELLITUS WITH HYPERGLYCEMIA, WITHOUT LONG-TERM CURRENT USE OF INSULIN (HCC): ICD-10-CM

## 2022-03-31 DIAGNOSIS — M79.7 FIBROMYALGIA: ICD-10-CM

## 2022-03-31 DIAGNOSIS — I10 ESSENTIAL HYPERTENSION: ICD-10-CM

## 2022-04-01 RX ORDER — METFORMIN HYDROCHLORIDE 500 MG/1
TABLET, EXTENDED RELEASE ORAL
Qty: 360 TABLET | Refills: 3 | Status: SHIPPED | OUTPATIENT
Start: 2022-04-01

## 2022-04-01 RX ORDER — QUINAPRIL 40 MG/1
TABLET ORAL
Qty: 90 TABLET | Refills: 3 | Status: SHIPPED | OUTPATIENT
Start: 2022-04-01

## 2022-04-01 RX ORDER — GABAPENTIN 300 MG/1
CAPSULE ORAL
Qty: 360 CAPSULE | Refills: 3 | Status: SHIPPED | OUTPATIENT
Start: 2022-04-01

## 2022-04-04 ENCOUNTER — PATIENT MESSAGE (OUTPATIENT)
Dept: FAMILY MEDICINE CLINIC | Facility: CLINIC | Age: 67
End: 2022-04-04

## 2022-04-06 ENCOUNTER — APPOINTMENT (OUTPATIENT)
Dept: LAB | Facility: IMAGING CENTER | Age: 67
End: 2022-04-06
Payer: COMMERCIAL

## 2022-04-06 DIAGNOSIS — E11.65 TYPE 2 DIABETES MELLITUS WITH HYPERGLYCEMIA, WITHOUT LONG-TERM CURRENT USE OF INSULIN (HCC): ICD-10-CM

## 2022-04-06 LAB
ALBUMIN SERPL BCP-MCNC: 4.2 G/DL (ref 3.5–5)
ALP SERPL-CCNC: 55 U/L (ref 46–116)
ALT SERPL W P-5'-P-CCNC: 30 U/L (ref 12–78)
ANION GAP SERPL CALCULATED.3IONS-SCNC: 6 MMOL/L (ref 4–13)
AST SERPL W P-5'-P-CCNC: 32 U/L (ref 5–45)
BILIRUB SERPL-MCNC: 0.64 MG/DL (ref 0.2–1)
BUN SERPL-MCNC: 9 MG/DL (ref 5–25)
CALCIUM SERPL-MCNC: 10 MG/DL (ref 8.3–10.1)
CHLORIDE SERPL-SCNC: 105 MMOL/L (ref 100–108)
CO2 SERPL-SCNC: 27 MMOL/L (ref 21–32)
CREAT SERPL-MCNC: 0.72 MG/DL (ref 0.6–1.3)
GFR SERPL CREATININE-BSD FRML MDRD: 87 ML/MIN/1.73SQ M
GLUCOSE P FAST SERPL-MCNC: 174 MG/DL (ref 65–99)
POTASSIUM SERPL-SCNC: 4.2 MMOL/L (ref 3.5–5.3)
PROT SERPL-MCNC: 7.5 G/DL (ref 6.4–8.2)
SODIUM SERPL-SCNC: 138 MMOL/L (ref 136–145)

## 2022-04-06 PROCEDURE — 83036 HEMOGLOBIN GLYCOSYLATED A1C: CPT

## 2022-04-06 PROCEDURE — 36415 COLL VENOUS BLD VENIPUNCTURE: CPT

## 2022-04-06 PROCEDURE — 80053 COMPREHEN METABOLIC PANEL: CPT

## 2022-04-07 LAB
EST. AVERAGE GLUCOSE BLD GHB EST-MCNC: 137 MG/DL
HBA1C MFR BLD: 6.4 %

## 2022-05-31 ENCOUNTER — HOSPITAL ENCOUNTER (OUTPATIENT)
Dept: RADIOLOGY | Facility: IMAGING CENTER | Age: 67
Discharge: HOME | End: 2022-05-31
Payer: COMMERCIAL

## 2022-05-31 VITALS — WEIGHT: 293 LBS | HEIGHT: 66 IN | BODY MASS INDEX: 47.09 KG/M2

## 2022-05-31 DIAGNOSIS — Z12.31 ENCOUNTER FOR SCREENING MAMMOGRAM FOR BREAST CANCER: ICD-10-CM

## 2022-05-31 PROCEDURE — 77063 BREAST TOMOSYNTHESIS BI: CPT

## 2022-05-31 PROCEDURE — 77067 SCR MAMMO BI INCL CAD: CPT

## 2022-06-23 ENCOUNTER — RA CDI HCC (OUTPATIENT)
Dept: OTHER | Facility: HOSPITAL | Age: 67
End: 2022-06-23

## 2022-06-23 NOTE — PROGRESS NOTES
[id number]  [facility]  coding opportunities          Chart Reviewed number of suggestions sent to Provider: 1     Patients Insurance     Medicare Insurance: Arvind Carbajal

## 2022-07-11 ENCOUNTER — APPOINTMENT (OUTPATIENT)
Dept: LAB | Facility: IMAGING CENTER | Age: 67
End: 2022-07-11
Payer: COMMERCIAL

## 2022-07-11 DIAGNOSIS — I10 ESSENTIAL HYPERTENSION: ICD-10-CM

## 2022-07-11 DIAGNOSIS — E11.65 TYPE 2 DIABETES MELLITUS WITH HYPERGLYCEMIA, WITHOUT LONG-TERM CURRENT USE OF INSULIN (HCC): ICD-10-CM

## 2022-07-11 LAB
ALBUMIN SERPL BCP-MCNC: 3.8 G/DL (ref 3.5–5)
ALP SERPL-CCNC: 52 U/L (ref 46–116)
ALT SERPL W P-5'-P-CCNC: 29 U/L (ref 12–78)
ANION GAP SERPL CALCULATED.3IONS-SCNC: 5 MMOL/L (ref 4–13)
AST SERPL W P-5'-P-CCNC: 25 U/L (ref 5–45)
BILIRUB SERPL-MCNC: 0.7 MG/DL (ref 0.2–1)
BUN SERPL-MCNC: 16 MG/DL (ref 5–25)
CALCIUM SERPL-MCNC: 9.2 MG/DL (ref 8.3–10.1)
CHLORIDE SERPL-SCNC: 104 MMOL/L (ref 100–108)
CHOLEST SERPL-MCNC: 134 MG/DL
CO2 SERPL-SCNC: 28 MMOL/L (ref 21–32)
CREAT SERPL-MCNC: 0.71 MG/DL (ref 0.6–1.3)
CREAT UR-MCNC: 106 MG/DL
EST. AVERAGE GLUCOSE BLD GHB EST-MCNC: 143 MG/DL
GFR SERPL CREATININE-BSD FRML MDRD: 88 ML/MIN/1.73SQ M
GLUCOSE P FAST SERPL-MCNC: 153 MG/DL (ref 65–99)
HBA1C MFR BLD: 6.6 %
HDLC SERPL-MCNC: 72 MG/DL
LDLC SERPL CALC-MCNC: 33 MG/DL (ref 0–100)
MICROALBUMIN UR-MCNC: 28.4 MG/L (ref 0–20)
MICROALBUMIN/CREAT 24H UR: 27 MG/G CREATININE (ref 0–30)
POTASSIUM SERPL-SCNC: 4.3 MMOL/L (ref 3.5–5.3)
PROT SERPL-MCNC: 7.4 G/DL (ref 6.4–8.2)
SODIUM SERPL-SCNC: 137 MMOL/L (ref 136–145)
TRIGL SERPL-MCNC: 143 MG/DL

## 2022-07-11 PROCEDURE — 83036 HEMOGLOBIN GLYCOSYLATED A1C: CPT

## 2022-07-11 PROCEDURE — 82043 UR ALBUMIN QUANTITATIVE: CPT

## 2022-07-11 PROCEDURE — 36415 COLL VENOUS BLD VENIPUNCTURE: CPT

## 2022-07-11 PROCEDURE — 3044F HG A1C LEVEL LT 7.0%: CPT | Performed by: FAMILY MEDICINE

## 2022-07-11 PROCEDURE — 82570 ASSAY OF URINE CREATININE: CPT

## 2022-07-11 PROCEDURE — 80061 LIPID PANEL: CPT

## 2022-07-11 PROCEDURE — 80053 COMPREHEN METABOLIC PANEL: CPT

## 2022-07-11 PROCEDURE — 3061F NEG MICROALBUMINURIA REV: CPT | Performed by: FAMILY MEDICINE

## 2022-07-18 ENCOUNTER — OFFICE VISIT (OUTPATIENT)
Dept: FAMILY MEDICINE CLINIC | Facility: CLINIC | Age: 67
End: 2022-07-18
Payer: COMMERCIAL

## 2022-07-18 ENCOUNTER — APPOINTMENT (OUTPATIENT)
Dept: LAB | Facility: MEDICAL CENTER | Age: 67
End: 2022-07-18
Payer: COMMERCIAL

## 2022-07-18 VITALS
HEIGHT: 66 IN | WEIGHT: 293 LBS | OXYGEN SATURATION: 97 % | SYSTOLIC BLOOD PRESSURE: 130 MMHG | DIASTOLIC BLOOD PRESSURE: 84 MMHG | RESPIRATION RATE: 12 BRPM | HEART RATE: 98 BPM | BODY MASS INDEX: 47.09 KG/M2

## 2022-07-18 DIAGNOSIS — M79.10 MYALGIA: ICD-10-CM

## 2022-07-18 DIAGNOSIS — E55.9 VITAMIN D DEFICIENCY: ICD-10-CM

## 2022-07-18 DIAGNOSIS — F33.42 MDD (MAJOR DEPRESSIVE DISORDER), RECURRENT, IN FULL REMISSION (HCC): ICD-10-CM

## 2022-07-18 DIAGNOSIS — E66.01 CLASS 3 SEVERE OBESITY DUE TO EXCESS CALORIES WITH SERIOUS COMORBIDITY AND BODY MASS INDEX (BMI) OF 45.0 TO 49.9 IN ADULT (HCC): ICD-10-CM

## 2022-07-18 DIAGNOSIS — M79.7 FIBROMYALGIA: ICD-10-CM

## 2022-07-18 DIAGNOSIS — K21.9 GASTROESOPHAGEAL REFLUX DISEASE WITHOUT ESOPHAGITIS: ICD-10-CM

## 2022-07-18 DIAGNOSIS — E78.00 PURE HYPERCHOLESTEROLEMIA: ICD-10-CM

## 2022-07-18 DIAGNOSIS — E11.65 TYPE 2 DIABETES MELLITUS WITH HYPERGLYCEMIA, WITHOUT LONG-TERM CURRENT USE OF INSULIN (HCC): Primary | ICD-10-CM

## 2022-07-18 DIAGNOSIS — I10 ESSENTIAL HYPERTENSION: ICD-10-CM

## 2022-07-18 LAB
25(OH)D3 SERPL-MCNC: 52.4 NG/ML (ref 30–100)
CK SERPL-CCNC: 62 U/L (ref 26–192)
CRP SERPL QL: <3 MG/L

## 2022-07-18 PROCEDURE — 86140 C-REACTIVE PROTEIN: CPT

## 2022-07-18 PROCEDURE — 3079F DIAST BP 80-89 MM HG: CPT | Performed by: FAMILY MEDICINE

## 2022-07-18 PROCEDURE — 1160F RVW MEDS BY RX/DR IN RCRD: CPT | Performed by: FAMILY MEDICINE

## 2022-07-18 PROCEDURE — 82306 VITAMIN D 25 HYDROXY: CPT

## 2022-07-18 PROCEDURE — 99214 OFFICE O/P EST MOD 30 MIN: CPT | Performed by: FAMILY MEDICINE

## 2022-07-18 PROCEDURE — 36415 COLL VENOUS BLD VENIPUNCTURE: CPT

## 2022-07-18 PROCEDURE — 3075F SYST BP GE 130 - 139MM HG: CPT | Performed by: FAMILY MEDICINE

## 2022-07-18 PROCEDURE — 82550 ASSAY OF CK (CPK): CPT

## 2022-07-18 NOTE — PROGRESS NOTES
Assessment/Plan:  BMI Counseling: Body mass index is 49 33 kg/m²  The BMI is above normal  Nutrition recommendations include encouraging healthy choices of fruits and vegetables  Rationale for BMI follow-up plan is due to patient being overweight or obese  Problem List Items Addressed This Visit        Digestive    Gastroesophageal reflux disease without esophagitis       Endocrine    Type 2 diabetes mellitus with hyperglycemia, without long-term current use of insulin (HCC) - Primary    Relevant Orders    Hemoglobin A1C    Comprehensive metabolic panel    CBC and differential       Cardiovascular and Mediastinum    Essential hypertension       Other    Fibromyalgia    Relevant Orders    CK (with reflex to MB)    C-reactive protein    Vitamin D 25 hydroxy    Pure hypercholesterolemia    Class 3 severe obesity due to excess calories with serious comorbidity and body mass index (BMI) of 45 0 to 49 9 in [facility])    Vitamin D deficiency    Relevant Orders    Vitamin D 25 hydroxy    Myalgia     Having some diffuse myalgias  Check inflammatory markers, CK as well as vitamin-D  Hold Crestor for 2-3 weeks and then give me a call and let me know if she is improving or not  She does have a history of fibromyalgia which certainly could be the cause  We may decide to bump up her trazodone 100 mg at bedtime  I would suggest continued exercise and sleep maintenance  Relevant Orders    CK (with reflex to MB)    C-reactive protein    Vitamin D 25 hydroxy    MDD (major depressive disorder), recurrent, in full remission ([facility] )    Relevant Orders    Comprehensive metabolic panel    CBC and differential            Subjective:      Patient ID: Paddy Strauss is a 79 y o  female  HPI patient presents today for follow-up for chronic health issues  Unfortunately, she is experiencing some increasing myalgias consistent with her previous history of fibromyalgia  She has noted some poor sleep over the past week  She also started a statin about a year ago  Fortunately, diabetes and hyperlipidemia seem very well controlled this time  She is having no chest pain, shortness breath or palpitations  The following portions of the patient's history were reviewed and updated as appropriate: allergies, current medications, past family history, past medical history, past social history, past surgical history and problem list       Current Outpatient Medications:     Cholecalciferol (VITAMIN D) 2000 units tablet, Take 2,000 Units by mouth daily, Disp: , Rfl:     fexofenadine (ALLEGRA) 180 MG tablet, Take 180 mg by mouth daily, Disp: , Rfl:     fluticasone (FLONASE) 50 mcg/act nasal spray, 1 spray into each nostril daily, Disp: , Rfl:     gabapentin (NEURONTIN) 300 mg capsule, TAKE 1 CAPSULE IN THE MORNING, 1 CAPSULE AT LUNCH AND 2 CAPSULES AT BEDTIME, Disp: 360 capsule, Rfl: 3    metFORMIN (GLUCOPHAGE-XR) 500 mg 24 hr tablet, TAKE 4 TABLETS DAILY FOR DIABETES, Disp: 360 tablet, Rfl: 3    naproxen sodium (ALEVE) 220 MG tablet, Take 2 tabs every morning  Take 2 tabs in the evening as needed  , Disp: , Rfl:     nystatin (MYCOSTATIN) powder, APPLY 1 APPLICATION TOPICALLY 2 (TWO) TIMES A DAY AS DIRECTED, Disp: 60 g, Rfl: 5    quinapril (ACCUPRIL) 40 MG tablet, TAKE 1 TABLET DAILY, Disp: 90 tablet, Rfl: 3    rosuvastatin (CRESTOR) 10 MG tablet, TAKE 1 TABLET DAILY, Disp: 90 tablet, Rfl: 3    traZODone (DESYREL) 50 mg tablet, TAKE 1 TABLET DAILY AT BEDTIME AS NEEDED FOR SLEEP, Disp: 90 tablet, Rfl: 3    ciprofloxacin (CIPRO) 250 mg tablet, Every 6 months before bladder injections, Disp: , Rfl:     glucose blood (OneTouch Verio) test strip, Use 1 each daily Use as instructed to check blood sugar -  - or as preferred by insurance coverage, Disp: 100 each, Rfl: 1    Lancets (OneTouch Delica Plus [id number]) MISC, Use 1 each daily To check blood sugar  - or as preferred by insurance coverage, Disp: 100 each, Rfl: 1     Review of Systems   Constitutional: Positive for fatigue  HENT: Negative for trouble swallowing  Respiratory: Negative for chest tightness, shortness of breath and wheezing  Cardiovascular: Negative for chest pain, palpitations and leg swelling  Gastrointestinal: Negative for abdominal pain, constipation and diarrhea  Endocrine: Negative for polyuria  Genitourinary: Negative for difficulty urinating and flank pain  Musculoskeletal: Positive for myalgias  Negative for arthralgias  Skin: Negative for rash  Psychiatric/Behavioral: Negative for sleep disturbance  Objective:      /84 (BP Location: Left arm, Patient Position: Sitting, Cuff Size: Standard)   Pulse 98   Resp 12   Ht 5' 6" (1 676 m)   Wt (!) 139 kg (305 lb 9 6 oz)   SpO2 97%   BMI 49 33 kg/m²          Physical Exam  Cardiovascular:      Pulses: no weak pulses          Dorsalis pedis pulses are 1+ on the right side and 1+ on the left side  Posterior tibial pulses are 1+ on the right side and 1+ on the left side  Feet:      Right foot:      Skin integrity: No ulcer, skin breakdown, erythema, warmth, callus or dry skin  Left foot:      Skin integrity: No ulcer, skin breakdown, erythema, warmth, callus or dry skin  Rachid Simon MD    Diabetic Foot Exam    Patient's shoes and socks removed  Right Foot/Ankle   Right Foot Inspection  Skin Exam: skin normal and skin intact  No dry skin, no warmth, no callus, no erythema, no maceration, no abnormal color, no pre-ulcer, no ulcer and no callus  Toe Exam: ROM and strength within normal limits  Sensory   Monofilament testing: diminished    Vascular  Capillary refills: < 3 seconds  The right DP pulse is 1+  The right PT pulse is 1+  Left Foot/Ankle  Left Foot Inspection  Skin Exam: skin normal and skin intact  No dry skin, no warmth, no erythema, no maceration, normal color, no pre-ulcer, no ulcer and no callus       Toe Exam: ROM and strength within normal limits  Sensory   Monofilament testing: diminished    Vascular  Capillary refills: < 3 seconds  The left DP pulse is 1+  The left PT pulse is 1+       Assign Risk Category  No loss of protective sensation  No weak pulses

## 2022-07-18 NOTE — ASSESSMENT & PLAN NOTE
Having some diffuse myalgias  Check inflammatory markers, CK as well as vitamin-D  Hold Crestor for 2-3 weeks and then give me a call and let me know if she is improving or not  She does have a history of fibromyalgia which certainly could be the cause  We may decide to bump up her trazodone 100 mg at bedtime  I would suggest continued exercise and sleep maintenance

## 2022-07-21 ENCOUNTER — PATIENT MESSAGE (OUTPATIENT)
Dept: FAMILY MEDICINE CLINIC | Facility: CLINIC | Age: 67
End: 2022-07-21

## 2022-08-23 ENCOUNTER — OFFICE VISIT (OUTPATIENT)
Dept: OBGYN CLINIC | Facility: CLINIC | Age: 67
End: 2022-08-23
Payer: COMMERCIAL

## 2022-08-23 VITALS
BODY MASS INDEX: 47.09 KG/M2 | WEIGHT: 293 LBS | HEIGHT: 66 IN | SYSTOLIC BLOOD PRESSURE: 159 MMHG | DIASTOLIC BLOOD PRESSURE: 85 MMHG | HEART RATE: 102 BPM

## 2022-08-23 DIAGNOSIS — M21.41 ACQUIRED PES PLANOVALGUS OF RIGHT FOOT: Primary | ICD-10-CM

## 2022-08-23 PROCEDURE — 3079F DIAST BP 80-89 MM HG: CPT | Performed by: ORTHOPAEDIC SURGERY

## 2022-08-23 PROCEDURE — 3077F SYST BP >= 140 MM HG: CPT | Performed by: ORTHOPAEDIC SURGERY

## 2022-08-23 PROCEDURE — 99203 OFFICE O/P NEW LOW 30 MIN: CPT | Performed by: ORTHOPAEDIC SURGERY

## 2022-08-23 PROCEDURE — 1160F RVW MEDS BY RX/DR IN RCRD: CPT | Performed by: ORTHOPAEDIC SURGERY

## 2022-08-23 NOTE — PROGRESS NOTES
Arlys Cogan, M D  Attending, Orthopaedic Surgery  [facility] [address] [facility]        ORTHOPAEDIC FOOT AND ANKLE CLINIC VISIT     Assessment:     Encounter Diagnosis   Name Primary? 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  · She was placed into an AFO brace and has been managed nonoperatively since then  · We have Xrays available from June 29, 2020 and Aug 5, 2022 which are essentially unchanged  There is a valgus nonunion with broken hardware but no drift over the past two years  She has no pain and lives her life in the brace and uses a walker  · She has two options at this time, continue to monitor this ankle with series xrays every 6 months  Once we know this ankle is stable and not drifting/collapsing further, this could be her end result  If she has pain or this ankle collapses, she would require a revision TTC arthrodesis   · We will complete a physical exam with the splint off  Return in about 6 months (around 2/23/2023)  Total ankle xray series needed at that time  History of Present Illness:   Chief Complaint:   Chief Complaint   Patient presents with    Right Ankle - Pain     Kayode Knox is a 79 y o  female who is being seen for right foot arthritis and hardware failure  She is referred by Dr Brian Dang  Pt states that she has had multiple surgeries completed by Dr Tripp Gil of the right foot in the past including ankle reconstruction in 2012 and ankle fusion in 2013  Pt states that in 2014, her screws broke and she has been in a ankle brace since   She states that her referring physicians told her that she may benefit from removal of the screws and an ankle fusion if it is possible  They have continually monitored with xray since she describes no pain and no tenderness  She uses a walker for long distances and a cane for ambulating in all other situations  She uses an Aircast at night and in the shower  She also has a history of right ankle arthritis  Patient does have diabetes and does not take blood thinners  Patient denies family history of anesthesia complications and has not had any complications with anesthesia  Pain/symptom timing:  Worse during the day when active  Pain/symptom context:  Worse with activites and work  Pain/symptom modifying factors:  Rest makes better, activities make worse  Pain/symptom associated signs/symptoms: none    Prior treatment   · NSAIDsNot Asked    · Injections Not Asked   · Bracing/Orthotics Yes   · Physical Therapy No     Orthopedic Surgical History:   2012: Ankle Reconstruction  2013: Ankle Fusion    See additional below  Past Medical, Surgical and Social History:  Past Medical History:  has a past medical history of Anemia, Dysfunction of eustachian tube, Endometriosis, Fibromyalgia, Hyperlipidemia, IBS (irritable bowel syndrome), and Vitamin D deficiency  Problem List: does not have any pertinent problems on file  Past Surgical History:  has a past surgical history that includes Ankle surgery (N/A);  section; Joint replacement; Lumbar epidural injection (2005); pr colonoscopy flx dx w/collj spec when pfrmd (N/A, 2017); and Total hip arthroplasty (Left, 2013)  Family History: family history includes No Known Problems in her daughter, maternal aunt, maternal aunt, maternal aunt, maternal aunt, maternal grandfather, maternal grandmother, paternal grandfather, paternal grandmother, sister, and son; Other in her father and mother  Social History:  reports that she has never smoked   She has never used smokeless tobacco  She reports current alcohol use  She reports that she does not use drugs  Current Medications: has a current medication list which includes the following prescription(s): vitamin d, ciprofloxacin, fexofenadine, fluticasone, gabapentin, onetouch verio, onetouch delica plus [id number], metformin, naproxen sodium, nystatin, quinapril, trazodone, and rosuvastatin  Allergies: is allergic to augmentin [amoxicillin-pot clavulanate], azithromycin, doxycycline, erythromycin, lodine [etodolac], lorazepam, and sulfa antibiotics  Review of Systems:  General- denies fever/chills  HEENT- denies hearing loss or sore throat  Eyes- denies eye pain or visual disturbances, denies red eyes  Respiratory- denies cough or SOB  Cardio- denies chest pain or palpitations  GI- denies abdominal pain  Endocrine- denies urinary frequency  Urinary- denies pain with urination  Musculoskeletal- Negative except noted above  Skin- denies rashes or wounds  Neurological- denies dizziness or headache  Psychiatric- denies anxiety or difficulty concentrating    Physical Exam:   /85 (BP Location: Left arm, Patient Position: Sitting, Cuff Size: Adult)   Pulse 102   Ht 5' 6" (1 676 m)   Wt (!) 138 kg (305 lb)   BMI 49 23 kg/m²   General/Constitutional: No apparent distress: well-nourished and well developed  Eyes: normal ocular motion  Cardio: RRR, Normal S1S2, No m/r/g  Lymphatic: No appreciable lymphadenopathy  Respiratory: Non-labored breathing, CTA b/l no w/c/r  Vascular: No edema, swelling or tenderness, except as noted in detailed exam   Integumentary: No impressive skin lesions present, except as noted in detailed exam   Neuro: No ataxia or tremors noted  Psych: Normal mood and affect, oriented to person, place and time  Appropriate affect  Musculoskeletal: Normal, except as noted in detailed exam and in HPI      Examination    Right    Gait Normal, uses a walker   Musculoskeletal Not completed due to splint     Skin Not completed due to splint     Nails Not completed due to splint     Range of Motion  Not completed due to splint   Not completed due to splint     Stability Not completed due to splint     Muscle Strength Not completed due to splint     Neurologic Not completed due to splint     Sensation Not completed due to splint     Cardiovascular Not completed due to splint       Special Tests Not completed due to splint       Imaging Studies:   No new images     Scribe Attestation    I,:  Radha Connolly PA-C am acting as a scribe while in the presence of the attending physician :       I,:  Cliff Boykin MD personally performed the services described in this documentation    as scribed in my presence :             Sherral Mires Lachman, MD  Foot & Ankle Surgery   Department of [address] [place] [address]      I personally performed the service  Sherral Mires Lachman, MD

## 2022-10-19 NOTE — PATIENT COMMUNICATION
Fax received from insurance on statin adherence  Per chart review, rosuvastatin was stopped due to myalgia  Patient also had myalgia with simvastatin in 2021  Medication list updated

## 2022-11-23 ENCOUNTER — TELEPHONE (OUTPATIENT)
Dept: FAMILY MEDICINE CLINIC | Facility: CLINIC | Age: 67
End: 2022-11-23

## 2022-11-23 NOTE — TELEPHONE ENCOUNTER
Pt called in stating she needs a letter excusing her from jury duty due to medical reasons  The letter will need to be faxed to the [facility]  She stated it is due 10 days from today's date      Fax# [phone]  Juror# [id number]

## 2022-12-07 ENCOUNTER — TELEPHONE (OUTPATIENT)
Dept: FAMILY MEDICINE CLINIC | Facility: CLINIC | Age: 67
End: 2022-12-07

## 2022-12-07 NOTE — TELEPHONE ENCOUNTER
Diabetic statement need to also have poor circulation to qualify   Please update order     Please fax ASAP appt for pt is 12/08 AM     F [phone]

## 2022-12-29 ENCOUNTER — RA CDI HCC (OUTPATIENT)
Dept: OTHER | Facility: HOSPITAL | Age: 67
End: 2022-12-29

## 2022-12-29 NOTE — PROGRESS NOTES
[id number] for 2023  [facility]  coding opportunities          Chart Reviewed number of suggestions sent to Provider: 1     Patients Insurance     Medicare Insurance: Arvind Carbajal

## 2023-01-04 ENCOUNTER — APPOINTMENT (OUTPATIENT)
Dept: LAB | Facility: IMAGING CENTER | Age: 68
End: 2023-01-04

## 2023-01-04 DIAGNOSIS — F33.42 MDD (MAJOR DEPRESSIVE DISORDER), RECURRENT, IN FULL REMISSION (HCC): ICD-10-CM

## 2023-01-04 DIAGNOSIS — E11.65 TYPE 2 DIABETES MELLITUS WITH HYPERGLYCEMIA, WITHOUT LONG-TERM CURRENT USE OF INSULIN (HCC): ICD-10-CM

## 2023-01-04 LAB
ALBUMIN SERPL BCP-MCNC: 3.9 G/DL (ref 3.5–5)
ALP SERPL-CCNC: 62 U/L (ref 46–116)
ALT SERPL W P-5'-P-CCNC: 37 U/L (ref 12–78)
ANION GAP SERPL CALCULATED.3IONS-SCNC: 6 MMOL/L (ref 4–13)
AST SERPL W P-5'-P-CCNC: 37 U/L (ref 5–45)
BASOPHILS # BLD AUTO: 0.09 THOUSANDS/ÂΜL (ref 0–0.1)
BASOPHILS NFR BLD AUTO: 1 % (ref 0–1)
BILIRUB SERPL-MCNC: 0.55 MG/DL (ref 0.2–1)
BUN SERPL-MCNC: 11 MG/DL (ref 5–25)
CALCIUM SERPL-MCNC: 9.7 MG/DL (ref 8.3–10.1)
CHLORIDE SERPL-SCNC: 103 MMOL/L (ref 96–108)
CO2 SERPL-SCNC: 28 MMOL/L (ref 21–32)
CREAT SERPL-MCNC: 0.84 MG/DL (ref 0.6–1.3)
EOSINOPHIL # BLD AUTO: 0.27 THOUSAND/ÂΜL (ref 0–0.61)
EOSINOPHIL NFR BLD AUTO: 3 % (ref 0–6)
ERYTHROCYTE [DISTWIDTH] IN BLOOD BY AUTOMATED COUNT: 12.9 % (ref 11.6–15.1)
GFR SERPL CREATININE-BSD FRML MDRD: 72 ML/MIN/1.73SQ M
GLUCOSE P FAST SERPL-MCNC: 192 MG/DL (ref 65–99)
HCT VFR BLD AUTO: 46.5 % (ref 34.8–46.1)
HGB BLD-MCNC: 15 G/DL (ref 11.5–15.4)
IMM GRANULOCYTES # BLD AUTO: 0.03 THOUSAND/UL (ref 0–0.2)
IMM GRANULOCYTES NFR BLD AUTO: 0 % (ref 0–2)
LYMPHOCYTES # BLD AUTO: 2.36 THOUSANDS/ÂΜL (ref 0.6–4.47)
LYMPHOCYTES NFR BLD AUTO: 29 % (ref 14–44)
MCH RBC QN AUTO: 30.5 PG (ref 26.8–34.3)
MCHC RBC AUTO-ENTMCNC: 32.3 G/DL (ref 31.4–37.4)
MCV RBC AUTO: 95 FL (ref 82–98)
MONOCYTES # BLD AUTO: 0.56 THOUSAND/ÂΜL (ref 0.17–1.22)
MONOCYTES NFR BLD AUTO: 7 % (ref 4–12)
NEUTROPHILS # BLD AUTO: 4.84 THOUSANDS/ÂΜL (ref 1.85–7.62)
NEUTS SEG NFR BLD AUTO: 60 % (ref 43–75)
NRBC BLD AUTO-RTO: 0 /100 WBCS
PLATELET # BLD AUTO: 310 THOUSANDS/UL (ref 149–390)
PMV BLD AUTO: 11 FL (ref 8.9–12.7)
POTASSIUM SERPL-SCNC: 4.6 MMOL/L (ref 3.5–5.3)
PROT SERPL-MCNC: 7.6 G/DL (ref 6.4–8.4)
RBC # BLD AUTO: 4.92 MILLION/UL (ref 3.81–5.12)
SODIUM SERPL-SCNC: 137 MMOL/L (ref 135–147)
WBC # BLD AUTO: 8.15 THOUSAND/UL (ref 4.31–10.16)

## 2023-01-07 LAB
EST. AVERAGE GLUCOSE BLD GHB EST-MCNC: 151 MG/DL
HBA1C MFR BLD: 6.9 %

## 2023-01-09 ENCOUNTER — OFFICE VISIT (OUTPATIENT)
Dept: FAMILY MEDICINE CLINIC | Facility: CLINIC | Age: 68
End: 2023-01-09

## 2023-01-09 ENCOUNTER — TELEPHONE (OUTPATIENT)
Dept: ADMINISTRATIVE | Facility: OTHER | Age: 68
End: 2023-01-09

## 2023-01-09 VITALS
TEMPERATURE: 97.4 F | HEART RATE: 102 BPM | HEIGHT: 66 IN | WEIGHT: 293 LBS | BODY MASS INDEX: 47.09 KG/M2 | DIASTOLIC BLOOD PRESSURE: 86 MMHG | SYSTOLIC BLOOD PRESSURE: 136 MMHG | OXYGEN SATURATION: 98 %

## 2023-01-09 DIAGNOSIS — Z78.0 POST-MENOPAUSAL: ICD-10-CM

## 2023-01-09 DIAGNOSIS — F33.42 MDD (MAJOR DEPRESSIVE DISORDER), RECURRENT, IN FULL REMISSION (HCC): ICD-10-CM

## 2023-01-09 DIAGNOSIS — G47.01 INSOMNIA DUE TO MEDICAL CONDITION: ICD-10-CM

## 2023-01-09 DIAGNOSIS — Z12.11 COLON CANCER SCREENING: ICD-10-CM

## 2023-01-09 DIAGNOSIS — K21.9 GASTROESOPHAGEAL REFLUX DISEASE WITHOUT ESOPHAGITIS: ICD-10-CM

## 2023-01-09 DIAGNOSIS — M79.642 PAIN OF LEFT HAND: ICD-10-CM

## 2023-01-09 DIAGNOSIS — Z86.010 HISTORY OF COLON POLYPS: ICD-10-CM

## 2023-01-09 DIAGNOSIS — E11.65 TYPE 2 DIABETES MELLITUS WITH HYPERGLYCEMIA, WITHOUT LONG-TERM CURRENT USE OF INSULIN (HCC): ICD-10-CM

## 2023-01-09 DIAGNOSIS — E78.00 PURE HYPERCHOLESTEROLEMIA: ICD-10-CM

## 2023-01-09 DIAGNOSIS — F41.9 ANXIETY: ICD-10-CM

## 2023-01-09 DIAGNOSIS — Z12.31 ENCOUNTER FOR SCREENING MAMMOGRAM FOR BREAST CANCER: ICD-10-CM

## 2023-01-09 DIAGNOSIS — E55.9 VITAMIN D DEFICIENCY: ICD-10-CM

## 2023-01-09 DIAGNOSIS — Z23 ENCOUNTER FOR IMMUNIZATION: ICD-10-CM

## 2023-01-09 DIAGNOSIS — E66.01 CLASS 3 SEVERE OBESITY DUE TO EXCESS CALORIES WITH SERIOUS COMORBIDITY AND BODY MASS INDEX (BMI) OF 45.0 TO 49.9 IN ADULT (HCC): ICD-10-CM

## 2023-01-09 DIAGNOSIS — I10 ESSENTIAL HYPERTENSION: ICD-10-CM

## 2023-01-09 DIAGNOSIS — Z00.00 MEDICARE ANNUAL WELLNESS VISIT, SUBSEQUENT: Primary | ICD-10-CM

## 2023-01-09 RX ORDER — AMOXICILLIN 500 MG/1
CAPSULE ORAL
COMMUNITY
Start: 2022-10-03

## 2023-01-09 NOTE — ASSESSMENT & PLAN NOTE
Blood pressure is controlled today but is top normal   I did encourage her to check her blood pressure on occasion and let me know if she is running above 140/90

## 2023-01-09 NOTE — ASSESSMENT & PLAN NOTE
She is having some pain at the base of her left index finger after a mild trauma  She has full flexion extension  This is probably a ligament injury  I offered hand physical therapy or follow-up with one of our hand physicians but she would like to hold off on that at this time  Contact me if she is having persistent discomfort  The injury occurred in late December

## 2023-01-09 NOTE — PATIENT INSTRUCTIONS
Urinary incontinence  We will continue follow-up with urogynecology  Situational  anxiety  Is having some intermittent situational anxiety and what sounds like some racing thoughts  I feel she would benefit from some talk therapy but she would like to hold off on that at this time  She may benefit greatly from an sebastian such as simple habit or calm    MDD (major depressive disorder), recurrent, in full remission ([address] )  She is pretty stable at this time    Pain of left hand  She is having some pain at the base of her left index finger after a mild trauma  She has full flexion extension  This is probably a ligament injury  I offered hand physical therapy or follow-up with one of our hand physicians but she would like to hold off on that at this time  Contact me if she is having persistent discomfort  The injury occurred in late December  Class 3 severe obesity due to excess calories with serious comorbidity and body mass index (BMI) of 45 0 to 49 9 in [facility])  She has limitations to exercise from her foot orthotics and issues  Continue to work on weight loss    Gastroesophageal reflux disease without esophagitis  Stable at this time    Medicare Preventive Visit Patient Instructions  Thank you for completing your Welcome to Medicare Visit or Medicare Annual Wellness Visit today  Your next wellness visit will be due in one year (1/10/2024)  The screening/preventive services that you may require over the next 5-10 years are detailed below  Some tests may not apply to you based off risk factors and/or age  Screening tests ordered at today's visit but not completed yet may show as past due  Also, please note that scanned in results may not display below    Preventive Screenings:  Service Recommendations Previous Testing/Comments   Colorectal Cancer Screening  * Colonoscopy    * Fecal Occult Blood Test (FOBT)/Fecal Immunochemical Test (FIT)  * Fecal DNA/Cologuard Test  * Flexible Sigmoidoscopy Age: 39-70 years old   Colonoscopy: every 10 years (may be performed more frequently if at higher risk)  OR  FOBT/FIT: every 1 year  OR  Cologuard: every 3 years  OR  Sigmoidoscopy: every 5 years  Screening may be recommended earlier than age 39 if at higher risk for colorectal cancer  Also, an individualized decision between you and your healthcare provider will decide whether screening between the ages of 74-80 would be appropriate  Colonoscopy: 08/03/2017  FOBT/FIT: Not on file  Cologuard: Not on file  Sigmoidoscopy: Not on file          Breast Cancer Screening Age: 36 years old  Frequency: every 1-2 years  Not required if history of left and right mastectomy Mammogram: 05/31/2022        Cervical Cancer Screening Between the ages of 21-29, pap smear recommended once every 3 years  Between the ages of 33-67, can perform pap smear with HPV co-testing every 5 years  Recommendations may differ for women with a history of total hysterectomy, cervical cancer, or abnormal pap smears in past  Pap Smear: Not on file        Hepatitis C Screening Once for adults born between 1945 and 1965  More frequently in patients at high risk for Hepatitis C Hep C Antibody: 07/28/2017        Diabetes Screening 1-2 times per year if you're at risk for diabetes or have pre-diabetes Fasting glucose: 192 mg/dL (1/4/2023)  A1C: 6 9 % (1/4/2023)      Cholesterol Screening Once every 5 years if you don't have a lipid disorder  May order more often based on risk factors  Lipid panel: 07/11/2022          Other Preventive Screenings Covered by Medicare:  Abdominal Aortic Aneurysm (AAA) Screening: covered once if your at risk  You're considered to be at risk if you have a family history of AAA    Lung Cancer Screening: covers low dose CT scan once per year if you meet all of the following conditions: (1) Age 50-69; (2) No signs or symptoms of lung cancer; (3) Current smoker or have quit smoking within the last 15 years; (4) You have a tobacco smoking history of at least 20 pack years (packs per day multiplied by number of years you smoked); (5) You get a written order from a healthcare provider  Glaucoma Screening: covered annually if you're considered high risk: (1) You have diabetes OR (2) Family history of glaucoma OR (3)  aged 48 and older OR (3)  American aged 72 and older  Osteoporosis Screening: covered every 2 years if you meet one of the following conditions: (1) You're estrogen deficient and at risk for osteoporosis based off medical history and other findings; (2) Have a vertebral abnormality; (3) On glucocorticoid therapy for more than 3 months; (4) Have primary hyperparathyroidism; (5) On osteoporosis medications and need to assess response to drug therapy  Last bone density test (DXA Scan): 09/26/2017  HIV Screening: covered annually if you're between the age of 12-76  Also covered annually if you are younger than 13 and older than 72 with risk factors for HIV infection  For pregnant patients, it is covered up to 3 times per pregnancy  Immunizations:  Immunization Recommendations   Influenza Vaccine Annual influenza vaccination during flu season is recommended for all persons aged >= 6 months who do not have contraindications   Pneumococcal Vaccine   * Pneumococcal conjugate vaccine = PCV13 (Prevnar 13), PCV15 (Vaxneuvance), PCV20 (Prevnar 20)  * Pneumococcal polysaccharide vaccine = PPSV23 (Pneumovax) Adults 25-60 years old: 1-3 doses may be recommended based on certain risk factors  Adults 72 years old: 1-2 doses may be recommended based off what pneumonia vaccine you previously received   Hepatitis B Vaccine 3 dose series if at intermediate or high risk (ex: diabetes, end stage renal disease, liver disease)   Tetanus (Td) Vaccine - COST NOT COVERED BY MEDICARE PART B Following completion of primary series, a booster dose should be given every 10 years to maintain immunity against tetanus   Td may also be given as tetanus wound prophylaxis  Tdap Vaccine - COST NOT COVERED BY MEDICARE PART B Recommended at least once for all adults  For pregnant patients, recommended with each pregnancy  Shingles Vaccine (Shingrix) - COST NOT COVERED BY MEDICARE PART B  2 shot series recommended in those aged 48 and above     Health Maintenance Due:      Topic Date Due    DXA SCAN  09/26/2019    Colorectal Cancer Screening  08/03/2022    Breast Cancer Screening: Mammogram  05/31/2023    Hepatitis C Screening  Completed     Immunizations Due:      Topic Date Due    Hepatitis B Vaccine (1 of 3 - 3-dose series) Never done    Pneumococcal Vaccine: 65+ Years (2 - PCV) 11/18/2021    COVID-19 Vaccine (5 - Booster for [place] series) 05/28/2022     Advance Directives   What are advance directives? Advance directives are legal documents that state your wishes and plans for medical care  These plans are made ahead of time in case you lose your ability to make decisions for yourself  Advance directives can apply to any medical decision, such as the treatments you want, and if you want to donate organs  What are the types of advance directives? There are many types of advance directives, and each state has rules about how to use them  You may choose a combination of any of the following:  Living will: This is a written record of the treatment you want  You can also choose which treatments you do not want, which to limit, and which to stop at a certain time  This includes surgery, medicine, IV fluid, and tube feedings  Durable power of  for healthcare [facility]): This is a written record that states who you want to make healthcare choices for you when you are unable to make them for yourself  This person, called a proxy, is usually a family member or a friend  You may choose more than 1 proxy  Do not resuscitate (DNR) order:  A DNR order is used in case your heart stops beating or you stop breathing   It is a request not to have certain forms of treatment, such as CPR  A DNR order may be included in other types of advance directives  Medical directive: This covers the care that you want if you are in a coma, near death, or unable to make decisions for yourself  You can list the treatments you want for each condition  Treatment may include pain medicine, surgery, blood transfusions, dialysis, IV or tube feedings, and a ventilator (breathing machine)  Values history: This document has questions about your views, beliefs, and how you feel and think about life  This information can help others choose the care that you would choose  Why are advance directives important? An advance directive helps you control your care  Although spoken wishes may be used, it is better to have your wishes written down  Spoken wishes can be misunderstood, or not followed  Treatments may be given even if you do not want them  An advance directive may make it easier for your family to make difficult choices about your care  Weight Management   Why it is important to manage your weight:  Being overweight increases your risk of health conditions such as heart disease, high blood pressure, type 2 diabetes, and certain types of cancer  It can also increase your risk for osteoarthritis, sleep apnea, and other respiratory problems  Aim for a slow, steady weight loss  Even a small amount of weight loss can lower your risk of health problems  How to lose weight safely:  A safe and healthy way to lose weight is to eat fewer calories and get regular exercise  You can lose up about 1 pound a week by decreasing the number of calories you eat by 500 calories each day  Healthy meal plan for weight management:  A healthy meal plan includes a variety of foods, contains fewer calories, and helps you stay healthy  A healthy meal plan includes the following:  Eat whole-grain foods more often  A healthy meal plan should contain fiber   Fiber is the part of grains, fruits, and vegetables that is not broken down by your body  Whole-grain foods are healthy and provide extra fiber in your diet  Some examples of whole-grain foods are whole-wheat breads and pastas, oatmeal, brown rice, and bulgur  Eat a variety of vegetables every day  Include dark, leafy greens such as spinach, kale, ant greens, and mustard greens  Eat yellow and orange vegetables such as carrots, sweet potatoes, and winter squash  Eat a variety of fruits every day  Choose fresh or canned fruit (canned in its own juice or light syrup) instead of juice  Fruit juice has very little or no fiber  Eat low-fat dairy foods  Drink fat-free (skim) milk or 1% milk  Eat fat-free yogurt and low-fat cottage cheese  Try low-fat cheeses such as mozzarella and other reduced-fat cheeses  Choose meat and other protein foods that are low in fat  Choose beans or other legumes such as split peas or lentils  Choose fish, skinless poultry (chicken or turkey), or lean cuts of red meat (beef or pork)  Before you cook meat or poultry, cut off any visible fat  Use less fat and oil  Try baking foods instead of frying them  Add less fat, such as margarine, sour cream, regular salad dressing and mayonnaise to foods  Eat fewer high-fat foods  Some examples of high-fat foods include french fries, doughnuts, ice cream, and cakes  Eat fewer sweets  Limit foods and drinks that are high in sugar  This includes candy, cookies, regular soda, and sweetened drinks  Exercise:  Exercise at least 30 minutes per day on most days of the week  Some examples of exercise include walking, biking, dancing, and swimming  You can also fit in more physical activity by taking the stairs instead of the elevator or parking farther away from stores  Ask your healthcare provider about the best exercise plan for you        © Copyright [employer] 2018 Information is for End User's use only and may not be sold, redistributed or otherwise used for commercial purposes  All illustrations and images included in CareNotes® are the copyrighted property of A D A M , Inc  or Lissette Garcia     Urinary Incontinence   AMBULATORY CARE:   Urinary incontinence (UI)  is when you lose control of your bladder  UI develops because your bladder cannot store or empty urine properly  The 3 most common types of UI are stress incontinence, urge incontinence, or both  Common symptoms include the following: You feel like your bladder does not empty completely when you urinate  You urinate often and need to urinate immediately  You leak urine when you sleep, or you wake up with the urge to urinate  You leak urine when you cough, sneeze, exercise, or laugh  Call your doctor if:   You have severe pain  You are confused or cannot think clearly  You have a fever  You see blood in your urine  You have pain when you urinate  You have new or worse pain, even after treatment  Your mouth feels dry or you have vision changes  Your urine is cloudy or smells bad  You have questions or concerns about your condition or care  Medicines:   Medicines  may be given to help strengthen your bladder control  Take your medicine as directed  Contact your healthcare provider if you think your medicine is not helping or if you have side effects  Tell him or her if you are allergic to any medicine  Keep a list of the medicines, vitamins, and herbs you take  Include the amounts, and when and why you take them  Bring the list or the pill bottles to follow-up visits  Carry your medicine list with you in case of an emergency  Do pelvic muscle exercises often:  Your pelvic muscles help you stop urinating  Squeeze these muscles tight for 5 seconds, then relax for 5 seconds  Gradually work up to squeezing for 10 seconds  Do 3 sets of 15 repetitions a day, or as directed  This will help strengthen your pelvic muscles and improve bladder control    Train your bladder:  Go to the bathroom at set times, such as every 2 hours, even if you do not feel the urge to go  You can also try to hold your urine when you feel the urge to go  For example, hold your urine for 5 minutes when you feel the urge to go  As that becomes easier, hold your urine for 10 minutes  Self-care:   Keep a UI record  Write down how often you leak urine and how much you leak  Make a note of what you were doing when you leaked urine  Drink liquids as directed  Ask your healthcare provider how much liquid to drink each day and which liquids are best for you  You may need to limit the amount of liquid you drink to help control your urine leakage  Do not drink any liquid right before you go to bed  Limit or do not have drinks that contain caffeine or alcohol  Prevent constipation  Eat a variety of high-fiber foods  Good examples are high-fiber cereals, beans, vegetables, and whole-grain breads  Prune juice may help make your bowel movement softer  Walking is the best way to trigger your intestines to have a bowel movement  Exercise regularly and maintain a healthy weight  Ask your healthcare provider how much you should weigh and about the best exercise plan for you  Weight loss and exercise will decrease pressure on your bladder and help you control your leakage  Ask him or her to help you create a weight loss plan if you are overweight  Use a catheter as directed  to help empty your bladder  A catheter is a tiny, plastic tube that is put into your bladder to drain your urine  Your healthcare provider may tell you to use a catheter to prevent your bladder from getting too full and leaking urine  Go to behavior therapy as directed  Behavior therapy may be used to help you learn to control your urge to urinate  Follow up with your doctor as directed:  Write down your questions so you remember to ask them during your visits     © Copyright [employer] 2022 Information is for End User's use only and may not be sold, redistributed or otherwise used for commercial purposes  All illustrations and images included in CareNotes® are the copyrighted property of A D A M , Inc  or Lissette Graves  The above information is an  only  It is not intended as medical advice for individual conditions or treatments  Talk to your doctor, nurse or pharmacist before following any medical regimen to see if it is safe and effective for you

## 2023-01-09 NOTE — ASSESSMENT & PLAN NOTE
She has limitations to exercise from her foot orthotics and issues    Continue to work on weight loss

## 2023-01-09 NOTE — TELEPHONE ENCOUNTER
Upon review of the In Basket request and the patient's chart, initial outreach has been made via fax to facility  Please see Contacts section for details       Thank you  Pipo Clifford MA

## 2023-01-09 NOTE — ASSESSMENT & PLAN NOTE
Is having some intermittent situational anxiety and what sounds like some racing thoughts  I feel she would benefit from some talk therapy but she would like to hold off on that at this time    She may benefit greatly from an sebastian such as simple habit or calm

## 2023-01-09 NOTE — TELEPHONE ENCOUNTER
----- Message from [facility] sent at 1/9/2023 10:19 AM EST -----  Regarding: DM Foot Exam  01/09/23 10:19 AM    Hello, our patient Melody Livingston has had Diabetic Foot Exam completed/performed  Please assist in updating the patient chart by making an External outreach to Matthew Loya DPM, facility located in:    [facility], [address] [facility]    The date of service is August 2022 (patient unsure of exact date)      Thank you,  [facility]  [address]

## 2023-01-09 NOTE — LETTER
Diabetic Foot Exam Form    Date Requested: 23  Patient: Ministerio Andrews  Patient : 1955   Referring Provider: Yousuf Tyler MD    Diabetic Foot Exam Performed with shoes and socks removed        Yes         No     Date of Diabetic Foot Exam ______________________________  Risk Score ____________________________________________    Left Foot       Visual Inspection         Monofilament Testing Sensory Exam        Pedal Pulses         Additional Comments         Right Foot      Visual Inspection         Monofilament Testing Sensory Exam       Pedal Pulses         Additional Comments         Comments __________________________________________________________    Practice Providing Exam ______________________________________________    Exam Performed By (print name) _______________________________________      Provider Signature ___________________________________________________      These reports are needed for  compliance  Please fax this completed form and a copy of the Diabetic Foot Exam report to our office located at [place] [address] as soon as possible via [phone] fouzia Dudley: Phone [phone]    We thank you for your assistance in treating our mutual patient

## 2023-01-09 NOTE — PROGRESS NOTES
Assessment and Plan:     Problem List Items Addressed This Visit        Digestive    Gastroesophageal reflux disease without esophagitis     Stable at this time            Endocrine    Type 2 diabetes mellitus with hyperglycemia, without long-term current use of insulin ([facility])    Relevant Orders    Hemoglobin A1C    Lipid Panel with Direct LDL reflex    Microalbumin / creatinine urine ratio       Cardiovascular and Mediastinum    Essential hypertension     Blood pressure is controlled today but is top normal   I did encourage her to check her blood pressure on occasion and let me know if she is running above 140/90         Relevant Orders    CBC and differential    Comprehensive metabolic panel       Other    Pure hypercholesterolemia    Class 3 severe obesity due to excess calories with serious comorbidity and body mass index (BMI) of 45 0 to 49 9 in [facility])     She has limitations to exercise from her foot orthotics and issues  Continue to work on weight loss         Vitamin D deficiency    Relevant Orders    Vitamin D 25 hydroxy    Insomnia due to medical condition    MDD (major depressive disorder), recurrent, in full remission ([facility] [address] )     She is pretty stable at this time         Situational  anxiety     Is having some intermittent situational anxiety and what sounds like some racing thoughts  I feel she would benefit from some talk therapy but she would like to hold off on that at this time  She may benefit greatly from an sebastian such as simple habit or calm         Pain of left hand     She is having some pain at the base of her left index finger after a mild trauma  She has full flexion extension  This is probably a ligament injury  I offered hand physical therapy or follow-up with one of our hand physicians but she would like to hold off on that at this time  Contact me if she is having persistent discomfort  The injury occurred in late December          Other Visit Diagnoses     Medicare annual wellness visit, subsequent    -  Primary    Encounter for screening mammogram for breast cancer        Relevant Orders    Mammo screening bilateral w 3d & cad    Encounter for immunization        Relevant Orders    Pneumococcal Conjugate Vaccine 20-valent (PCV20)    Post-menopausal        Relevant Orders    DXA bone density spine hip and pelvis    Colon cancer screening        History of colon polyps        Relevant Orders    Ambulatory referral for colonoscopy        BMI Counseling: Body mass index is 50 16 kg/m²  The BMI is above normal  Nutrition recommendations include encouraging healthy choices of fruits and vegetables  Exercise recommendations include exercising 3-5 times per week  Rationale for BMI follow-up plan is due to patient being overweight or obese  Depression Screening and Follow-up Plan: Patient assessed for underlying major depression  Brief counseling provided and recommend additional follow-up/re-evaluation next office visit  Preventive health issues were discussed with patient, and age appropriate screening tests were ordered as noted in patient's After Visit Summary  Personalized health advice and appropriate referrals for health education or preventive services given if needed, as noted in patient's After Visit Summary  History of Present Illness:     Patient presents for a Medicare Wellness Visit    HPI     Patient presents today for follow-up for chronic health issues as well as Medicare wellness visit  She has had some increased anxiety recently  Their house is being worked on and this has been stressful  Her A1c has bumped up a bit  She denies any excessive depressed mood  Otherwise, she feels pretty stable  She does have some chronic pain from her lower extremities  She has a new issue of some pain in her left index finger after a pot twisted when she was trying to catch it  Pain is worse with extension  She has full flexion extension      Patient Care Team:  Amena Hobson MD as PCP - General  MD Carlos Hernandez MD (Urogynecology)  Sara Fraser DPM (Podiatry)  Lurdes Scott MD as [address] [place] [address] (Ophthalmology)  Cristino Raymundo Pharmacist as Pharmacist (Pharmacy)     Review of Systems:     Review of Systems   Constitutional: Negative for appetite change, chills, fatigue, fever and unexpected weight change  HENT: Negative for trouble swallowing  Eyes: Negative for visual disturbance  Respiratory: Negative for cough, chest tightness, shortness of breath and wheezing  Cardiovascular: Negative for chest pain, palpitations and leg swelling  Gastrointestinal: Negative for abdominal distention, abdominal pain, blood in stool, constipation and diarrhea  Endocrine: Negative for polyuria  Genitourinary: Negative for difficulty urinating and flank pain  Musculoskeletal: Negative for arthralgias and myalgias  Skin: Negative for rash  Neurological: Negative for dizziness and light-headedness  Hematological: Negative for adenopathy  Does not bruise/bleed easily  Psychiatric/Behavioral: Positive for dysphoric mood  Negative for sleep disturbance  The patient is nervous/anxious           Problem List:     Patient Active Problem List   Diagnosis   • Type 2 diabetes mellitus with hyperglycemia, without long-term current use of insulin (HCC)   • Fibromyalgia   • Neurologic gait dysfunction   • Pure hypercholesterolemia   • Essential hypertension   • Irritable bowel syndrome   • Lower back pain   • Class 3 severe obesity due to excess calories with serious comorbidity and body mass index (BMI) of 45 0 to 49 9 in adult [facility])   • Rosacea   • Urinary incontinence   • Vitamin D deficiency   • Congenital valgus deformity of foot   • Primary localized osteoarthrosis of right ankle and foot   • Osteoarthritis of hip   • Primary osteoarthritis of right knee   • Tibialis tendinitis   • History of total hip replacement   • Insomnia due to medical condition   • Other hemorrhoids   • Lateral epicondylitis of right elbow   • Gastroesophageal reflux disease without esophagitis   • Myalgia   • MDD (major depressive disorder), recurrent, in full remission ([facility] [address] )   • Acquired pes planovalgus of right foot   • Situational  anxiety   • Pain of left hand      Past Medical and Surgical History:     Past Medical History:   Diagnosis Date   • Anemia     last assessed: 2015   • Dysfunction of eustachian tube     unspecified laterality; last assessed: 2013   • Endometriosis    • Fibromyalgia    • Hyperlipidemia    • IBS (irritable bowel syndrome)    • Vitamin D deficiency      Past Surgical History:   Procedure Laterality Date   • ANKLE SURGERY N/A      left ankle,  right ankle,  irght ankle-extensive surgery   •  SECTION      x2   • JOINT REPLACEMENT      hip   • LUMBAR EPIDURAL INJECTION  12/2005    x 1   • AZ COLONOSCOPY FLX DX W/COLLJ SPEC WHEN PFRMD N/A 2017    Procedure: COLONOSCOPY with polypectomy;  Surgeon: Octaviano Pressley MD;  Location: [facility];   Service: Gastroenterology   • TOTAL HIP ARTHROPLASTY Left 2013      Family History:     Family History   Problem Relation Age of Onset   • Other Mother         CABG   • Other Father         gangrene   • No Known Problems Sister    • No Known Problems Daughter    • No Known Problems Maternal Grandmother    • No Known Problems Maternal Grandfather    • No Known Problems Paternal Grandmother    • No Known Problems Paternal Grandfather    • No Known Problems Maternal Aunt    • No Known Problems Maternal Aunt    • No Known Problems Maternal Aunt    • No Known Problems Maternal Aunt    • No Known Problems Son    • Colon cancer Neg Hx       Social History:     Social History     Socioeconomic History   • Marital status: /Civil Union     Spouse name: None   • Number of children: None   • Years of education: None   • Highest education level: None   Occupational History   • None   Tobacco Use   • Smoking status: Never   • Smokeless tobacco: Never   Vaping Use   • Vaping Use: Never used   Substance and Sexual Activity   • Alcohol use: Yes     Comment: socail;  5 drinks weekly (as per [employer])   • Drug use: No   • Sexual activity: Yes     Partners: Male   Other Topics Concern   • None   Social History Narrative   • None     Social Determinants of Health     Financial Resource Strain: Low Risk    • Difficulty of Paying Living Expenses: Not very hard   Food Insecurity: Not on file   Transportation Needs: No Transportation Needs   • Lack of Transportation (Medical): No   • Lack of Transportation (Non-Medical): No   Physical Activity: Not on file   Stress: Not on file   Social Connections: Not on file   Intimate Partner Violence: Not on file   Housing Stability: Not on file      Medications and Allergies:     Current Outpatient Medications   Medication Sig Dispense Refill   • amoxicillin (AMOXIL) 500 mg capsule TAKE 4 CAPS 1 HOUR PRIOR TO DENTAL TREATMENT     • Cholecalciferol (VITAMIN D) 2000 units tablet Take 2,000 Units by mouth daily     • ciprofloxacin (CIPRO) 250 mg tablet Every 6 months before bladder injections     • fexofenadine (ALLEGRA) 180 MG tablet Take 180 mg by mouth daily     • fluticasone (FLONASE) 50 mcg/act nasal spray 1 spray into each nostril daily     • gabapentin (NEURONTIN) 300 mg capsule TAKE 1 CAPSULE IN THE MORNING, 1 CAPSULE AT LUNCH AND 2 CAPSULES AT BEDTIME 360 capsule 3   • glucose blood (OneTouch Verio) test strip Use 1 each daily Use as instructed to check blood sugar -  - or as preferred by insurance coverage 100 each 1   • Lancets (OneTouch Delica Plus [id number]) MISC Use 1 each daily To check blood sugar  - or as preferred by insurance coverage 100 each 1   • metFORMIN (GLUCOPHAGE-XR) 500 mg 24 hr tablet TAKE 4 TABLETS DAILY FOR DIABETES 360 tablet 3   • naproxen sodium (ALEVE) 220 MG tablet Take 2 tabs every morning    Take 2 tabs in the evening as needed  • nystatin (MYCOSTATIN) powder APPLY 1 APPLICATION TOPICALLY 2 (TWO) TIMES A DAY AS DIRECTED 60 g 5   • quinapril (ACCUPRIL) 40 MG tablet TAKE 1 TABLET DAILY 90 tablet 3   • traZODone (DESYREL) 50 mg tablet TAKE 1 TABLET DAILY AT BEDTIME AS NEEDED FOR SLEEP 90 tablet 3     No current facility-administered medications for this visit  Allergies   Allergen Reactions   • Augmentin [Amoxicillin-Pot Clavulanate] Nausea Only     Category: nausea,diarrhea;    • Azithromycin Nausea Only   • Doxycycline GI Intolerance   • Erythromycin GI Intolerance   • Lodine [Etodolac]      Category: rash;    • Lorazepam      Annotation - [id number]: mental status changes   • Sulfa Antibiotics      Category: rash;       Immunizations:     Immunization History   Administered Date(s) Administered   • COVID-19 MODERNA VACC 0 5 ML IM 02/09/2021, 03/09/2021, 10/23/2021, 04/02/2022   • INFLUENZA 10/14/2009, 09/23/2015, 01/05/2017, 12/20/2017, 10/15/2022   • Influenza Quadrivalent Preservative Free 3 years and older IM 12/23/2014, 01/05/2017   • Influenza, high dose seasonal 0 7 mL 11/18/2020, 09/17/2021   • Influenza, recombinant, quadrivalent,injectable, preservative free 09/14/2018, 11/05/2019   • Influenza, seasonal, injectable 09/30/2011, 10/09/2012   • Pneumococcal Polysaccharide PPV23 12/15/2010, 11/18/2020   • Td (adult), adsorbed 05/10/2002      Health Maintenance:         Topic Date Due   • DXA SCAN  09/26/2019   • Colorectal Cancer Screening  08/03/2022   • Breast Cancer Screening: Mammogram  05/31/2023   • Hepatitis C Screening  Completed         Topic Date Due   • Pneumococcal Vaccine: 65+ Years (2 - PCV) 11/18/2021   • COVID-19 Vaccine (5 - Booster for Ashley Pillar series) 05/28/2022      Medicare Screening Tests and Risk Assessments:     Joy Gibbs is here for her Subsequent Wellness visit  Health Risk Assessment:   Patient rates overall health as fair  Patient feels that their physical health rating is same  Patient is dissatisfied with their life  Eyesight was rated as same  Hearing was rated as same  Patient feels that their emotional and mental health rating is same  Patients states they are sometimes angry  Patient states they are sometimes unusually tired/fatigued  Pain experienced in the last 7 days has been some  Patient's pain rating has been 4/10  Patient states that she has experienced no weight loss or gain in last 6 months  Fall Risk Screening: In the past year, patient has experienced: no history of falling in past year      Urinary Incontinence Screening:   Patient has leaked urine accidently in the last six months  Home Safety:  Patient has trouble with stairs inside or outside of their home  Patient has working smoke alarms and has working carbon monoxide detector  Home safety hazards include: none  Nutrition:   Current diet is Unhealthy  Medications:   Patient is currently taking over-the-counter supplements  OTC medications include: Aleve, Tylenol  Patient is able to manage medications  Activities of Daily Living (ADLs)/Instrumental Activities of Daily Living (IADLs):   Walk and transfer into and out of bed and chair?: Yes  Dress and groom yourself?: Yes    Bathe or shower yourself?: Yes    Feed yourself?  Yes  Do your laundry/housekeeping?: Yes  Manage your money, pay your bills and track your expenses?: Yes  Make your own meals?: Yes    Do your own shopping?: No    Previous Hospitalizations:   Any hospitalizations or ED visits within the last 12 months?: No      Advance Care Planning:   Living will: Yes    Durable POA for healthcare: No    Advanced directive: No    End of Life Decisions reviewed with patient: Yes      Cognitive Screening:   Provider or family/friend/caregiver concerned regarding cognition?: No    PREVENTIVE SCREENINGS      Cardiovascular Screening:    General: Screening Not Indicated and History Lipid Disorder      Diabetes Screening:     General: Screening Not Indicated and History Diabetes      Colorectal Cancer Screening:     General: Screening Current      Breast Cancer Screening:     General: Screening Current      Cervical Cancer Screening:    General: Screening Not Indicated      Osteoporosis Screening:    General: Risks and Benefits Discussed      Abdominal Aortic Aneurysm (AAA) Screening:        General: Screening Not Indicated      Lung Cancer Screening:     General: Screening Not Indicated      Hepatitis C Screening:    General: Screening Current    Screening, Brief Intervention, and Referral to Treatment (SBIRT)    Screening  Typical number of drinks in a day: 2  Typical number of drinks in a week: 10  Interpretation: Low risk drinking behavior  AUDIT-C Screenin) How often did you have a drink containing alcohol in the past year? 4 or more times a week  2) How many drinks did you have on a typical day when you were drinking in the past year? 1 to 2  3) How often did you have 6 or more drinks on one occasion in the past year? never    AUDIT-C Score: 4  Interpretation: Score 3-12 (female): POSITIVE screen for alcohol misuse    AUDIT Screenin) How often during the last year have you found that you were not able to stop drinking once you had started? 0 - never  5) How often during the last year have you failed to do what was normally expected from you because of drinking? 0 - never  6) How often during the last year have you needed a first drink in the morning to get yourself going after a heavy drinking session?  0 - never  7) How often during the last year have you had a feeling of guilt or remorse after drinking? 0 - never  8) How often during the last year have you been unable to remember what happened the night before because you had been drinking? 0 - never  9) Have you or someone else been injured as a result of your drinking? 0 - no  10) Has a relative or friend or a doctor or another health worker been concerned about your drinking or suggested you cut down? 0 - no    AUDIT Score: 4  Interpretation: Low risk alcohol consumption    Single Item Drug Screening:  How often have you used an illegal drug (including marijuana) or a prescription medication for non-medical reasons in the past year? never    Single Item Drug Screen Score: 0  Interpretation: Negative screen for possible drug use disorder    No results found  Physical Exam:     /86 (BP Location: Left arm, Patient Position: Sitting, Cuff Size: Large)   Pulse 102   Temp (!) 97 4 °F (36 3 °C) (Tympanic)   Ht 5' 6" (1 676 m)   Wt (!) 141 kg (310 lb 12 8 oz)   SpO2 98%   BMI 50 16 kg/m²     Physical Exam  Constitutional:       General: She is not in acute distress  Appearance: She is well-developed  She is obese  She is not diaphoretic  HENT:      Head: Normocephalic  Right Ear: External ear normal       Left Ear: External ear normal       Nose: Nose normal    Eyes:      General: No scleral icterus  Right eye: No discharge  Left eye: No discharge  Conjunctiva/sclera: Conjunctivae normal       Pupils: Pupils are equal, round, and reactive to light  Neck:      Thyroid: No thyromegaly  Trachea: No tracheal deviation  Cardiovascular:      Rate and Rhythm: Normal rate and regular rhythm  Heart sounds: Normal heart sounds  No murmur heard  Pulmonary:      Effort: Pulmonary effort is normal  No respiratory distress  Breath sounds: Normal breath sounds  Abdominal:      General: Bowel sounds are normal  There is no distension  Palpations: Abdomen is soft  Tenderness: There is no abdominal tenderness  Musculoskeletal:         General: No tenderness or deformity  Normal range of motion  Right lower leg: No edema  Left lower leg: No edema  Comments: Pain at the base of her MCP joint of her index finger and in the palm at this point  Flexion extension was on the percent as well strength     Lymphadenopathy:      Cervical: No cervical adenopathy  Skin:     General: Skin is warm  Coloration: Skin is not pale  Findings: No erythema or rash  Neurological:      Cranial Nerves: No cranial nerve deficit        Coordination: Coordination normal    Psychiatric:         Behavior: Behavior normal           Aurelia Mauricio MD

## 2023-01-11 ENCOUNTER — PATIENT MESSAGE (OUTPATIENT)
Dept: FAMILY MEDICINE CLINIC | Facility: CLINIC | Age: 68
End: 2023-01-11

## 2023-01-11 DIAGNOSIS — M79.642 PAIN OF LEFT HAND: Primary | ICD-10-CM

## 2023-01-13 NOTE — TELEPHONE ENCOUNTER
As a final attempt, a third outreach has been made via fax to facility  Please see Contacts section for details  This encounter will be closed and completed by end of day  Should we receive the requested information because of previous outreach attempts, the requested patient's chart will be updated appropriately       Thank you  Riddhi Carey MA

## 2023-01-14 DIAGNOSIS — M79.642 PAIN OF LEFT HAND: Primary | ICD-10-CM

## 2023-01-14 NOTE — PROGRESS NOTES
Problem List Items Addressed This Visit        Other    Pain of left hand - Primary    Relevant Orders    Ambulatory Referral to Hand Surgery

## 2023-01-16 ENCOUNTER — TELEPHONE (OUTPATIENT)
Dept: OBGYN CLINIC | Facility: HOSPITAL | Age: 68
End: 2023-01-16

## 2023-01-16 NOTE — TELEPHONE ENCOUNTER
Patient is being referred to a orthopedics  Please schedule accordingly      [address]   [phone]

## 2023-01-17 NOTE — TELEPHONE ENCOUNTER
As a final attempt, a third outreach has been made via telephone call to facility  Please see Contacts section for details  This encounter will be closed and completed by end of day  Should we receive the requested information because of previous outreach attempts, the requested patient's chart will be updated appropriately    Xavi Michael [place]

## 2023-01-18 ENCOUNTER — PREP FOR PROCEDURE (OUTPATIENT)
Dept: GASTROENTEROLOGY | Facility: CLINIC | Age: 68
End: 2023-01-18

## 2023-01-18 ENCOUNTER — TELEPHONE (OUTPATIENT)
Dept: GASTROENTEROLOGY | Facility: CLINIC | Age: 68
End: 2023-01-18

## 2023-01-18 DIAGNOSIS — K63.5 POLYP OF COLON, UNSPECIFIED PART OF COLON, UNSPECIFIED TYPE: Primary | ICD-10-CM

## 2023-01-18 NOTE — TELEPHONE ENCOUNTER
01/18/23  Screened by: Ashley Lowery MA    Referring Provider Dr Conley    Pre- Screening: Body mass index is 50 16 kg/m²  Has patient been referred for a routine screening Colonoscopy? yes  Is the patient between 39-70 years old? yes      Previous Colonoscopy yes   If yes:    Date: 2017    Facility:    Reason:       SCHEDULING STAFF: If the patient is between 39yrs-47yrs, please advise patient to confirm benefits/coverage with their insurance company for a routine screening colonoscopy, some insurance carriers will only cover at [facility] or [facility]  If the patient is over 66years old, please schedule an office visit  Does the patient want to see a Gastroenterologist prior to their procedure OR are they having any GI symptoms? no    Has the patient been hospitalized or had abdominal surgery in the past 6 months? no    Does the patient use supplemental oxygen? no    Does the patient take Coumadin, Lovenox, Plavix, Elliquis, Xarelto, or other blood thinning medication? no    Has the patient had a stroke, cardiac event, or stent placed in the past year? no    SCHEDULING STAFF: If patient answers NO to above questions, then schedule procedure  If patient answers YES to above questions, then schedule office appointment  If patient is between 45yrs - 49yrs, please advise patient that we will have to confirm benefits & coverage with their insurance company for a routine screening colonoscopy            Scheduled date of colonoscopy (as of today): 5/4/2023  Physician performing colonoscopy:Dr Tapia  Location of colonoscopy: [facility]  Bowel prep reviewed with patient: Golytely  Instructions reviewed with patient by:Swetha/rupali  Clearances: N/A

## 2023-01-18 NOTE — TELEPHONE ENCOUNTER
Upon review of the In Basket request we have found as a result of outreach that patient did not have the requested item(s) completed  Patient only had surgery - no DM Foot Exam    Any additional questions or concerns should be emailed to the Practice Liaisons via the appropriate education email address, please do not reply via In Basket      Thank you  Jaci Mejias MA

## 2023-02-14 DIAGNOSIS — G47.01 INSOMNIA DUE TO MEDICAL CONDITION: ICD-10-CM

## 2023-02-14 RX ORDER — TRAZODONE HYDROCHLORIDE 50 MG/1
TABLET ORAL
Qty: 90 TABLET | Refills: 3 | Status: SHIPPED | OUTPATIENT
Start: 2023-02-14

## 2023-02-21 ENCOUNTER — APPOINTMENT (OUTPATIENT)
Dept: RADIOLOGY | Facility: AMBULARY SURGERY CENTER | Age: 68
End: 2023-02-21
Attending: ORTHOPAEDIC SURGERY

## 2023-02-21 ENCOUNTER — OFFICE VISIT (OUTPATIENT)
Dept: OBGYN CLINIC | Facility: CLINIC | Age: 68
End: 2023-02-21

## 2023-02-21 VITALS
WEIGHT: 293 LBS | SYSTOLIC BLOOD PRESSURE: 170 MMHG | BODY MASS INDEX: 47.09 KG/M2 | DIASTOLIC BLOOD PRESSURE: 110 MMHG | HEART RATE: 108 BPM | HEIGHT: 66 IN

## 2023-02-21 DIAGNOSIS — Z01.89 ENCOUNTER FOR LOWER EXTREMITY COMPARISON IMAGING STUDY: ICD-10-CM

## 2023-02-21 DIAGNOSIS — M21.41 ACQUIRED PES PLANOVALGUS OF RIGHT FOOT: ICD-10-CM

## 2023-02-21 DIAGNOSIS — M21.41 ACQUIRED PES PLANOVALGUS OF RIGHT FOOT: Primary | ICD-10-CM

## 2023-02-21 NOTE — PROGRESS NOTES
BETSEY Guy  Attending, Orthopaedic Surgery  [facility] [address] [facility]      ORTHOPAEDIC FOOT AND ANKLE CLINIC VISIT     Assessment:     Encounter Diagnoses   Name Primary? • Acquired pes planovalgus of right foot Yes   • Encounter for lower extremity comparison imaging study             Plan:   · The patient verbalized understanding of exam findings and treatment plan  We engaged in the shared decision-making process and treatment options were discussed at length with the patient  Surgical and conservative management discussed today along with risks and benefits  · We have been monitoring the patient for signs of continued ankle collapse of her valgus nonunion and broken hardware  Her original surgery was completed by Dr Bandar Feliz in 2013  · Xray today demonstrates stable alignment of ankle without signs of additional broken hardware or worsening hardware failure/shifting    · Continue AFO brace and supportive shoe wear  · We will continue to monitor for any additional signs of drifting  No immediate surgical intervention at this time  · If it worsens, she will require a TTC arthrodesis   Return in about 6 months (around 8/21/2023)  Total ankle series needed    Flexion/extension view not necessary due to pantalar fusion      History of Present Illness:   Chief Complaint:   Chief Complaint   Patient presents with   • Right Ankle - Follow-up     Dominick Kumar is a 79 y o  female who is being seen in follow-up for Right ankle pain  When we last saw she we recommended continue bracing and monitor for signs of worsening  Pain has remained the same  Residual pain is localized at anterior and lateral ankle with minimal radiating and described as sharp and severe  Her symptoms are under control when she is wearing her brace        Pain/symptom timing:  Worse during the day when active  Pain/symptom context:  Worse with activites and work  Pain/symptom modifying factors:  Rest makes better, activities make worse  Pain/symptom associated signs/symptoms: none    Prior treatment   · NSAIDsYes   · Injections No   · Bracing/Orthotics Yes    · Physical Therapy Yes     Orthopedic Surgical History:   See below    Past Medical, Surgical and Social History:  Past Medical History:  has a past medical history of Anemia, Dysfunction of eustachian tube, Endometriosis, Fibromyalgia, Hyperlipidemia, IBS (irritable bowel syndrome), and Vitamin D deficiency  Problem List: does not have any pertinent problems on file  Past Surgical History:  has a past surgical history that includes Ankle surgery (N/A);  section; Joint replacement; Lumbar epidural injection (2005); pr colonoscopy flx dx w/collj spec when pfrmd (N/A, 2017); and Total hip arthroplasty (Left, 2013)  Family History: family history includes No Known Problems in her daughter, maternal aunt, maternal aunt, maternal aunt, maternal aunt, maternal grandfather, maternal grandmother, paternal grandfather, paternal grandmother, sister, and son; Other in her father and mother  Social History:  reports that she has never smoked  She has never used smokeless tobacco  She reports current alcohol use  She reports that she does not use drugs  Current Medications: has a current medication list which includes the following prescription(s): amoxicillin, vitamin d, ciprofloxacin, fexofenadine, fluticasone, gabapentin, onetouch verio, onetouch delica plus [id number], metformin, naproxen sodium, nystatin, polyethylene glycol, quinapril, and trazodone  Allergies: is allergic to augmentin [amoxicillin-pot clavulanate], azithromycin, doxycycline, erythromycin, lodine [etodolac], lorazepam, and sulfa antibiotics       Review of Systems:  General- denies fever/chills  HEENT- denies hearing loss or sore throat  Eyes- denies eye pain or visual disturbances, denies red eyes  Respiratory- denies cough or SOB  Cardio- denies chest pain or palpitations  GI- denies abdominal pain  Endocrine- denies urinary frequency  Urinary- denies pain with urination  Musculoskeletal- Negative except noted above  Skin- denies rashes or wounds  Neurological- denies dizziness or headache  Psychiatric- denies anxiety or difficulty concentrating    Physical Exam:   BP (!) 170/110 (BP Location: Right arm, Patient Position: Sitting, Cuff Size: Adult)   Pulse (!) 108   Ht 5' 6" (1 676 m)   Wt (!) 141 kg (310 lb)   BMI 50 04 kg/m²   General/Constitutional: No apparent distress: well-nourished and well developed  Eyes: normal ocular motion  Lymphatic: No appreciable lymphadenopathy  Respiratory: Non-labored breathing  Vascular: No edema, swelling or tenderness, except as noted in detailed exam   Integumentary: No impressive skin lesions present, except as noted in detailed exam   Neuro: No ataxia or tremors noted  Psych: Normal mood and affect, oriented to person, place and time  Appropriate affect  Musculoskeletal: Normal, except as noted in detailed exam and in HPI  Examination    Right    Gait Normal - wearing AFO brace and using rolling walker   Musculoskeletal Not assessed    Skin Normal       Nails Normal    Range of Motion  5 degrees dorsiflexion, 5 degrees plantarflexion  Subtalar motion: limited    Stability Stable    Muscle Strength 5/5 tibialis anterior  5/5 gastrocnemius-soleus  5/5 posterior tibialis  5/5 peroneal/eversion strength  5/5 EHL  5/5 FHL    Neurologic Normal    Sensation  Intact to light touch throughout sural, saphenous, superficial peroneal, deep peroneal and medial/lateral plantar nerve distributions  Orwell-Jose Miguel 5 07 filament (10g) testing  deferred  Cardiovascular Brisk capillary refill < 2 seconds,intact DP and PT pulses    Special Tests None      Imaging Studies:     6 views of the Right foot/ankle were obtained, reviewed and interpreted independently which demonstrate previous broken hardware that remains unchanged   No shifting or loosening of her hardware  Stable alignment of ankle joint versus previous imaging  Reviewed by me personally  Scribe Attestation    I,:  Carmen Colón PA-C am acting as a scribe while in the presence of the attending physician :       I,:  Jennifer Chávez MD personally performed the services described in this documentation    as scribed in my presence :               Isiah Hoyer Lachman, MD  Foot & Ankle Surgery   Department of [address] [place] [address]      I personally performed the service  Isiah Hoyer Lachman, MD

## 2023-05-02 ENCOUNTER — NURSE TRIAGE (OUTPATIENT)
Dept: OTHER | Facility: OTHER | Age: 68
End: 2023-05-02

## 2023-05-02 NOTE — TELEPHONE ENCOUNTER
"Patient called in to question her Metformin daily dose of 2000 mg which patient takes in the AM  Patient advised to take full dose per Diabetic med instructions  If instructions are different for this patient, please advise  Patient was advised to have regular ginger ale, apple juice, and jello to support her blood sugars through the day  Patient does not perform glucometer testing  Please follow up with patient if there is additional advice for Metformin  Reason for Disposition   Bowel prep for colonoscopy, questions about    Answer Assessment - Initial Assessment Questions  1  DATE/TIME: \"When did you have your colonoscopy? \"       Scheduled for Thursday 5/4/23  2  MAIN CONCERN: Amparo Rangel is your main concern right now? \" \"What questions do you have? \"     Metformin 2000 mg daily dose    Protocols used: COLONOSCOPY SYMPTOMS AND QUESTIONS-ADULT-    "

## 2023-05-02 NOTE — TELEPHONE ENCOUNTER
"Regarding: Medication question  ----- Message from Tuyet Pate sent at 5/2/2023  3:09 PM EDT -----  \"I have colonoscopy on 05-04-23, and I have a question about taking my Metformin  \"    "

## 2023-05-03 RX ORDER — SODIUM CHLORIDE 9 MG/ML
125 INJECTION, SOLUTION INTRAVENOUS CONTINUOUS
Status: CANCELLED | OUTPATIENT
Start: 2023-05-03

## 2023-05-04 ENCOUNTER — ANESTHESIA (OUTPATIENT)
Dept: GASTROENTEROLOGY | Facility: HOSPITAL | Age: 68
End: 2023-05-04

## 2023-05-04 ENCOUNTER — ANESTHESIA EVENT (OUTPATIENT)
Dept: GASTROENTEROLOGY | Facility: HOSPITAL | Age: 68
End: 2023-05-04

## 2023-05-04 ENCOUNTER — HOSPITAL ENCOUNTER (OUTPATIENT)
Dept: GASTROENTEROLOGY | Facility: HOSPITAL | Age: 68
Setting detail: OUTPATIENT SURGERY
End: 2023-05-04
Attending: INTERNAL MEDICINE

## 2023-05-04 VITALS
HEIGHT: 66 IN | OXYGEN SATURATION: 99 % | BODY MASS INDEX: 47.09 KG/M2 | WEIGHT: 293 LBS | RESPIRATION RATE: 16 BRPM | HEART RATE: 107 BPM | DIASTOLIC BLOOD PRESSURE: 72 MMHG | TEMPERATURE: 97.8 F | SYSTOLIC BLOOD PRESSURE: 120 MMHG

## 2023-05-04 DIAGNOSIS — K63.5 POLYP OF COLON, UNSPECIFIED PART OF COLON, UNSPECIFIED TYPE: ICD-10-CM

## 2023-05-04 LAB — GLUCOSE SERPL-MCNC: 172 MG/DL (ref 65–140)

## 2023-05-04 RX ORDER — SIMETHICONE 20 MG/.3ML
EMULSION ORAL AS NEEDED
Status: COMPLETED | OUTPATIENT
Start: 2023-05-04 | End: 2023-05-04

## 2023-05-04 RX ORDER — PROPOFOL 10 MG/ML
INJECTION, EMULSION INTRAVENOUS AS NEEDED
Status: DISCONTINUED | OUTPATIENT
Start: 2023-05-04 | End: 2023-05-04

## 2023-05-04 RX ORDER — PROPOFOL 10 MG/ML
INJECTION, EMULSION INTRAVENOUS CONTINUOUS PRN
Status: DISCONTINUED | OUTPATIENT
Start: 2023-05-04 | End: 2023-05-04

## 2023-05-04 RX ORDER — SODIUM CHLORIDE 9 MG/ML
125 INJECTION, SOLUTION INTRAVENOUS CONTINUOUS
Status: DISCONTINUED | OUTPATIENT
Start: 2023-05-04 | End: 2023-05-08 | Stop reason: HOSPADM

## 2023-05-04 RX ORDER — LIDOCAINE HYDROCHLORIDE 20 MG/ML
INJECTION, SOLUTION EPIDURAL; INFILTRATION; INTRACAUDAL; PERINEURAL AS NEEDED
Status: DISCONTINUED | OUTPATIENT
Start: 2023-05-04 | End: 2023-05-04

## 2023-05-04 RX ADMIN — SODIUM CHLORIDE: 0.9 INJECTION, SOLUTION INTRAVENOUS at 08:17

## 2023-05-04 RX ADMIN — Medication 40 MG: at 08:30

## 2023-05-04 RX ADMIN — PROPOFOL 60 MG: 10 INJECTION, EMULSION INTRAVENOUS at 08:22

## 2023-05-04 RX ADMIN — LIDOCAINE HYDROCHLORIDE 50 MG: 20 INJECTION, SOLUTION EPIDURAL; INFILTRATION; INTRACAUDAL at 08:21

## 2023-05-04 RX ADMIN — PROPOFOL 100 MCG/KG/MIN: 10 INJECTION, EMULSION INTRAVENOUS at 08:22

## 2023-05-04 NOTE — H&P
History and Physical - [facility]  Emeterio Annalisa Page 79 y o  female MRN: [id number]                  HPI: Jaci Grimaldo is a 79y o  year old female who presents for surveillance colonoscopy  Last colonoscopy  with 1 polyp  REVIEW OF SYSTEMS: Per the HPI, and otherwise unremarkable  Historical Information   Past Medical History:   Diagnosis Date    Anemia     last assessed: 2015    Dysfunction of eustachian tube     unspecified laterality; last assessed: 2013    Endometriosis     Fibromyalgia     Hyperlipidemia     IBS (irritable bowel syndrome)     Vitamin D deficiency      Past Surgical History:   Procedure Laterality Date    ANKLE SURGERY N/A      left ankle,  right ankle,  irght ankle-extensive surgery     SECTION      x2    JOINT REPLACEMENT      hip    LUMBAR EPIDURAL INJECTION  12/2005    x 1    [place] COLONOSCOPY FLX DX W/COLLJ SPEC WHEN PFRMD N/A 2017    Procedure: COLONOSCOPY with polypectomy;  Surgeon: Zonia rOtiz MD;  Location: [facility];   Service: Gastroenterology    TOTAL HIP ARTHROPLASTY Left 2013     Social History   Social History     Substance and Sexual Activity   Alcohol Use Yes    Comment: social, 5 drinks weekly (as per [employer])     Social History     Substance and Sexual Activity   Drug Use No     Social History     Tobacco Use   Smoking Status Never   Smokeless Tobacco Never     Family History   Problem Relation Age of Onset    Other Mother         CABG    Other Father         gangrene    No Known Problems Sister     No Known Problems Daughter     No Known Problems Maternal Grandmother     No Known Problems Maternal Grandfather     No Known Problems Paternal Grandmother     No Known Problems Paternal Grandfather     No Known Problems Maternal Aunt     No Known Problems Maternal Aunt     No Known Problems Maternal Aunt     No Known Problems Maternal Aunt     No Known Problems Son     Colon cancer Neg Hx "       Meds/Allergies       Current Outpatient Medications:     amoxicillin (AMOXIL) 500 mg capsule    Cholecalciferol (VITAMIN D) 2000 units tablet    fexofenadine (ALLEGRA) 180 MG tablet    fluticasone (FLONASE) 50 mcg/act nasal spray    gabapentin (NEURONTIN) 300 mg capsule    glucose blood (OneTouch Verio) test strip    Lancets (OneTouch Delica Plus [id number]) MISC    metFORMIN (GLUCOPHAGE-XR) 500 mg 24 hr tablet    naproxen sodium (ALEVE) 220 MG tablet    polyethylene glycol (GOLYTELY) 4000 mL solution    quinapril (ACCUPRIL) 40 MG tablet    traZODone (DESYREL) 50 mg tablet    ciprofloxacin (CIPRO) 250 mg tablet    nystatin (MYCOSTATIN) powder    Current Facility-Administered Medications:     sodium chloride 0 9 % infusion, 125 mL/hr, Intravenous, Continuous    Allergies   Allergen Reactions    Augmentin [Amoxicillin-Pot Clavulanate] Nausea Only     Category: nausea,diarrhea;     Azithromycin Nausea Only    Doxycycline GI Intolerance    Erythromycin GI Intolerance    Lodine [Etodolac]      Category: rash;     Lorazepam      Annotation - [id number]: mental status changes    Sulfa Antibiotics      Category: rash;        Objective     /97   Pulse (!) 115   Temp 97 8 °F (36 6 °C) (Temporal)   Resp 17   Ht 5' 6\" (1 676 m)   Wt (!) 141 kg (310 lb)   SpO2 95%   BMI 50 04 kg/m²       PHYSICAL EXAM    Gen: NAD  Head: NCAT  CV: RRR  CHEST: Clear  ABD: soft, NT/ND  EXT: no edema      ASSESSMENT/PLAN:  This is a 79y o  year old female here for surveillance colonoscopy, and she is stable and optimized for her procedure          "

## 2023-05-04 NOTE — ANESTHESIA PREPROCEDURE EVALUATION
Procedure:  COLONOSCOPY    Relevant Problems   CARDIO   (+) Essential hypertension   (+) Pure hypercholesterolemia      ENDO   (+) Type 2 diabetes mellitus with hyperglycemia, without long-term current use of insulin (HCC)      GI/HEPATIC   (+) Gastroesophageal reflux disease without esophagitis      MUSCULOSKELETAL   (+) Fibromyalgia   (+) Lateral epicondylitis of right elbow   (+) Lower back pain   (+) Osteoarthritis of hip   (+) Primary localized osteoarthrosis of right ankle and foot   (+) Primary osteoarthritis of right knee      NEURO/PSYCH   (+) Fibromyalgia   (+) MDD (major depressive disorder), recurrent, [facility] ([facility] )   (+) Situational  anxiety      Other   (+) Morbid obesity with BMI of 50 0-59 9, adult (HCC)   (+) Neurologic gait dysfunction        Physical Exam    Airway    Mallampati score: III  TM Distance: >3 FB  Neck ROM: full     Dental   No notable dental hx     Cardiovascular  Rhythm: regular, Rate: normal, Cardiovascular exam normal    Pulmonary  Pulmonary exam normal Breath sounds clear to auscultation,     Other Findings        Anesthesia Plan  ASA Score- 3     Anesthesia Type- IV sedation with anesthesia with ASA Monitors  Additional Monitors:   Airway Plan:     Comment: GA prn  Denies LEWIS history          Plan Factors-    Chart reviewed  Patient summary reviewed  Patient is not a current smoker  Patient not instructed to abstain from smoking on day of procedure  Patient did not smoke on day of surgery  Obstructive sleep apnea risk education given perioperatively  Induction- intravenous  Postoperative Plan-     Informed Consent- Anesthetic plan and risks discussed with patient and spouse  I personally reviewed this patient with the CRNA  Discussed and agreed on the Anesthesia Plan with the CRNA  Shirly Councilman

## 2023-05-04 NOTE — ANESTHESIA POSTPROCEDURE EVALUATION
"Post-Op Assessment Note    CV Status:  Stable    Pain management: adequate     Mental Status:  Alert and awake   Hydration Status:  Euvolemic   PONV Controlled:  Controlled   Airway Patency:  Patent      Post Op Vitals Reviewed: Yes      Staff: Anesthesiologist, CRNA         No notable events documented      BP      Temp      Pulse    Resp      SpO2      /72   Pulse (!) 107   Temp 97 8 °F (36 6 °C) (Temporal)   Resp 16   Ht 5' 6\" (1 676 m)   Wt (!) 141 kg (310 lb)   SpO2 99%   BMI 50 04 kg/m²     "

## 2023-06-14 ENCOUNTER — HOSPITAL ENCOUNTER (OUTPATIENT)
Dept: RADIOLOGY | Facility: IMAGING CENTER | Age: 68
Discharge: HOME | End: 2023-06-14
Payer: COMMERCIAL

## 2023-06-14 VITALS — WEIGHT: 209 LBS | BODY MASS INDEX: 33.59 KG/M2 | HEIGHT: 66 IN

## 2023-06-14 DIAGNOSIS — Z12.31 ENCOUNTER FOR SCREENING MAMMOGRAM FOR BREAST CANCER: ICD-10-CM

## 2023-06-14 PROCEDURE — 77063 BREAST TOMOSYNTHESIS BI: CPT

## 2023-06-14 PROCEDURE — 77067 SCR MAMMO BI INCL CAD: CPT

## 2023-07-03 ENCOUNTER — APPOINTMENT (OUTPATIENT)
Dept: LAB | Facility: IMAGING CENTER | Age: 68
End: 2023-07-03
Payer: COMMERCIAL

## 2023-07-03 DIAGNOSIS — I10 ESSENTIAL HYPERTENSION: ICD-10-CM

## 2023-07-03 DIAGNOSIS — E55.9 VITAMIN D DEFICIENCY: ICD-10-CM

## 2023-07-03 DIAGNOSIS — E11.65 TYPE 2 DIABETES MELLITUS WITH HYPERGLYCEMIA, WITHOUT LONG-TERM CURRENT USE OF INSULIN (HCC): ICD-10-CM

## 2023-07-03 LAB
25(OH)D3 SERPL-MCNC: 68.3 NG/ML (ref 30–100)
ALBUMIN SERPL BCP-MCNC: 3.5 G/DL (ref 3.5–5)
ALP SERPL-CCNC: 73 U/L (ref 46–116)
ALT SERPL W P-5'-P-CCNC: 19 U/L (ref 12–78)
ANION GAP SERPL CALCULATED.3IONS-SCNC: 7 MMOL/L
AST SERPL W P-5'-P-CCNC: 16 U/L (ref 5–45)
BASOPHILS # BLD AUTO: 0.05 THOUSANDS/ÂΜL (ref 0–0.1)
BASOPHILS NFR BLD AUTO: 0 % (ref 0–1)
BILIRUB SERPL-MCNC: 0.64 MG/DL (ref 0.2–1)
BUN SERPL-MCNC: 10 MG/DL (ref 5–25)
CALCIUM SERPL-MCNC: 10 MG/DL (ref 8.3–10.1)
CHLORIDE SERPL-SCNC: 105 MMOL/L (ref 96–108)
CHOLEST SERPL-MCNC: 192 MG/DL
CO2 SERPL-SCNC: 27 MMOL/L (ref 21–32)
CREAT SERPL-MCNC: 0.87 MG/DL (ref 0.6–1.3)
CREAT UR-MCNC: 132 MG/DL
EOSINOPHIL # BLD AUTO: 0.1 THOUSAND/ÂΜL (ref 0–0.61)
EOSINOPHIL NFR BLD AUTO: 1 % (ref 0–6)
ERYTHROCYTE [DISTWIDTH] IN BLOOD BY AUTOMATED COUNT: 13.6 % (ref 11.6–15.1)
EST. AVERAGE GLUCOSE BLD GHB EST-MCNC: 137 MG/DL
GFR SERPL CREATININE-BSD FRML MDRD: 68 ML/MIN/1.73SQ M
GLUCOSE P FAST SERPL-MCNC: 157 MG/DL (ref 65–99)
HBA1C MFR BLD: 6.4 %
HCT VFR BLD AUTO: 44.9 % (ref 34.8–46.1)
HDLC SERPL-MCNC: 59 MG/DL
HGB BLD-MCNC: 14.2 G/DL (ref 11.5–15.4)
IMM GRANULOCYTES # BLD AUTO: 0.06 THOUSAND/UL (ref 0–0.2)
IMM GRANULOCYTES NFR BLD AUTO: 1 % (ref 0–2)
LDLC SERPL CALC-MCNC: 103 MG/DL (ref 0–100)
LYMPHOCYTES # BLD AUTO: 1.93 THOUSANDS/ÂΜL (ref 0.6–4.47)
LYMPHOCYTES NFR BLD AUTO: 16 % (ref 14–44)
MCH RBC QN AUTO: 30.1 PG (ref 26.8–34.3)
MCHC RBC AUTO-ENTMCNC: 31.6 G/DL (ref 31.4–37.4)
MCV RBC AUTO: 95 FL (ref 82–98)
MICROALBUMIN UR-MCNC: 74.4 MG/L (ref 0–20)
MICROALBUMIN/CREAT 24H UR: 56 MG/G CREATININE (ref 0–30)
MONOCYTES # BLD AUTO: 0.99 THOUSAND/ÂΜL (ref 0.17–1.22)
MONOCYTES NFR BLD AUTO: 8 % (ref 4–12)
NEUTROPHILS # BLD AUTO: 8.86 THOUSANDS/ÂΜL (ref 1.85–7.62)
NEUTS SEG NFR BLD AUTO: 74 % (ref 43–75)
NRBC BLD AUTO-RTO: 0 /100 WBCS
PLATELET # BLD AUTO: 292 THOUSANDS/UL (ref 149–390)
PMV BLD AUTO: 11.3 FL (ref 8.9–12.7)
POTASSIUM SERPL-SCNC: 3.9 MMOL/L (ref 3.5–5.3)
PROT SERPL-MCNC: 7.7 G/DL (ref 6.4–8.4)
RBC # BLD AUTO: 4.72 MILLION/UL (ref 3.81–5.12)
SODIUM SERPL-SCNC: 139 MMOL/L (ref 135–147)
TRIGL SERPL-MCNC: 150 MG/DL
WBC # BLD AUTO: 11.99 THOUSAND/UL (ref 4.31–10.16)

## 2023-07-03 PROCEDURE — 85025 COMPLETE CBC W/AUTO DIFF WBC: CPT

## 2023-07-03 PROCEDURE — 82043 UR ALBUMIN QUANTITATIVE: CPT

## 2023-07-03 PROCEDURE — 83036 HEMOGLOBIN GLYCOSYLATED A1C: CPT

## 2023-07-03 PROCEDURE — 80061 LIPID PANEL: CPT

## 2023-07-03 PROCEDURE — 80053 COMPREHEN METABOLIC PANEL: CPT

## 2023-07-03 PROCEDURE — 36415 COLL VENOUS BLD VENIPUNCTURE: CPT

## 2023-07-03 PROCEDURE — 82570 ASSAY OF URINE CREATININE: CPT

## 2023-07-03 PROCEDURE — 82306 VITAMIN D 25 HYDROXY: CPT

## 2023-07-10 ENCOUNTER — OFFICE VISIT (OUTPATIENT)
Dept: FAMILY MEDICINE CLINIC | Facility: CLINIC | Age: 68
End: 2023-07-10
Payer: COMMERCIAL

## 2023-07-10 VITALS
TEMPERATURE: 97.4 F | SYSTOLIC BLOOD PRESSURE: 138 MMHG | HEART RATE: 96 BPM | BODY MASS INDEX: 47.52 KG/M2 | OXYGEN SATURATION: 97 % | WEIGHT: 293 LBS | DIASTOLIC BLOOD PRESSURE: 86 MMHG

## 2023-07-10 DIAGNOSIS — E55.9 VITAMIN D DEFICIENCY: ICD-10-CM

## 2023-07-10 DIAGNOSIS — E11.65 TYPE 2 DIABETES MELLITUS WITH HYPERGLYCEMIA, WITHOUT LONG-TERM CURRENT USE OF INSULIN (HCC): ICD-10-CM

## 2023-07-10 DIAGNOSIS — M79.7 FIBROMYALGIA: ICD-10-CM

## 2023-07-10 DIAGNOSIS — Q66.6 CONGENITAL VALGUS DEFORMITY OF FOOT: ICD-10-CM

## 2023-07-10 DIAGNOSIS — I10 ESSENTIAL HYPERTENSION: Primary | ICD-10-CM

## 2023-07-10 DIAGNOSIS — D72.829 LEUKOCYTOSIS, UNSPECIFIED TYPE: ICD-10-CM

## 2023-07-10 DIAGNOSIS — E78.00 PURE HYPERCHOLESTEROLEMIA: ICD-10-CM

## 2023-07-10 DIAGNOSIS — F33.42 MDD (MAJOR DEPRESSIVE DISORDER), RECURRENT, IN FULL REMISSION (HCC): ICD-10-CM

## 2023-07-10 DIAGNOSIS — M21.41 ACQUIRED PES PLANOVALGUS OF RIGHT FOOT: ICD-10-CM

## 2023-07-10 DIAGNOSIS — M17.11 PRIMARY OSTEOARTHRITIS OF RIGHT KNEE: ICD-10-CM

## 2023-07-10 PROBLEM — E66.01 MORBID OBESITY (HCC): Status: RESOLVED | Noted: 2023-03-08 | Resolved: 2023-07-10

## 2023-07-10 PROBLEM — M79.642 PAIN OF LEFT HAND: Status: RESOLVED | Noted: 2023-01-09 | Resolved: 2023-07-10

## 2023-07-10 PROBLEM — E66.01 MORBID OBESITY (HCC): Status: ACTIVE | Noted: 2023-03-08

## 2023-07-10 PROCEDURE — 99214 OFFICE O/P EST MOD 30 MIN: CPT | Performed by: FAMILY MEDICINE

## 2023-07-10 RX ORDER — LISINOPRIL 40 MG/1
40 TABLET ORAL DAILY
Qty: 90 TABLET | Refills: 3 | Status: SHIPPED | OUTPATIENT
Start: 2023-07-10

## 2023-07-10 RX ORDER — ROSUVASTATIN CALCIUM 5 MG/1
5 TABLET, COATED ORAL DAILY
Qty: 90 TABLET | Refills: 3 | Status: SHIPPED | OUTPATIENT
Start: 2023-07-10

## 2023-07-10 NOTE — ASSESSMENT & PLAN NOTE
He does not feel the gabapentin has been helpful. We are going to wean her off it. She is currently taking 1 in the morning and 2 at night. She should start by taking 1 in the morning and 1 at night for a week and then just 1 at night for a week and then she can stop it. If she has significant increased pain, we can always reinitiate that or consider another therapy.

## 2023-07-10 NOTE — PROGRESS NOTES
Assessment/Plan:       Problem List Items Addressed This Visit        Endocrine    Type 2 diabetes mellitus with hyperglycemia, without long-term current use of insulin (HCC)    Relevant Medications    lisinopril (ZESTRIL) 40 mg tablet    Other Relevant Orders    Comprehensive metabolic panel    Hemoglobin A1C    Albumin / creatinine urine ratio       Cardiovascular and Mediastinum    Essential hypertension - Primary    Relevant Medications    lisinopril (ZESTRIL) 40 mg tablet    Other Relevant Orders    Comprehensive metabolic panel       Musculoskeletal and Integument    Primary osteoarthritis of right knee       Other    Fibromyalgia     He does not feel the gabapentin has been helpful. We are going to wean her off it. She is currently taking 1 in the morning and 2 at night. She should start by taking 1 in the morning and 1 at night for a week and then just 1 at night for a week and then she can stop it. If she has significant increased pain, we can always reinitiate that or consider another therapy. Pure hypercholesterolemia    Relevant Medications    rosuvastatin (CRESTOR) 5 mg tablet    Other Relevant Orders    Comprehensive metabolic panel    Lipid Panel with Direct LDL reflex    Vitamin D deficiency    Relevant Orders    Vitamin D 25 hydroxy    Congenital valgus deformity of foot    MDD (major depressive disorder), recurrent, in full remission ([address])    Relevant Orders    Comprehensive metabolic panel    CBC and differential    Acquired pes planovalgus of right foot   Other Visit Diagnoses     Leukocytosis, unspecified type        Relevant Orders    CBC and differential    Comprehensive metabolic panel            Subjective:      Patient ID: Lizabeth Maguire is a 76 y.o. female. HPI   Patient presents today for follow-up for chronic health issues. Diabetic control remains excellent. She did have a bit of a increase in her urine microalbumin.   She has been compliant with quinapril but this is becoming more difficult to obtain. She has history of chronic deformities of bilateral ankles and does require paperwork to be done for her diabetic foot wear. She has recently lost some weight unintentionally. She did have a slight elevation of her white count and has had some intermittent chills. The following portions of the patient's history were reviewed and updated as appropriate: allergies, current medications, past family history, past medical history, past social history, past surgical history and problem list.      Current Outpatient Medications:   •  amoxicillin (AMOXIL) 500 mg capsule, TAKE 4 CAPS 1 HOUR PRIOR TO DENTAL TREATMENT, Disp: , Rfl:   •  Cholecalciferol (VITAMIN D) 2000 units tablet, Take 2,000 Units by mouth daily, Disp: , Rfl:   •  fexofenadine (ALLEGRA) 180 MG tablet, Take 180 mg by mouth daily, Disp: , Rfl:   •  fluticasone (FLONASE) 50 mcg/act nasal spray, 1 spray into each nostril daily, Disp: , Rfl:   •  glucose blood (OneTouch Verio) test strip, Use 1 each daily Use as instructed to check blood sugar -  - or as preferred by insurance coverage, Disp: 100 each, Rfl: 1  •  Lancets (OneTouch Delica Plus [id number]) MISC, Use 1 each daily To check blood sugar  - or as preferred by insurance coverage, Disp: 100 each, Rfl: 1  •  lisinopril (ZESTRIL) 40 mg tablet, Take 1 tablet (40 mg total) by mouth daily, Disp: 90 tablet, Rfl: 3  •  metFORMIN (GLUCOPHAGE-XR) 500 mg 24 hr tablet, TAKE 4 TABLETS DAILY FOR DIABETES, Disp: 360 tablet, Rfl: 3  •  naproxen sodium (ALEVE) 220 MG tablet, Take 2 tabs every morning. Take 2 tabs in the evening as needed. , Disp: , Rfl:   •  nystatin (MYCOSTATIN) powder, APPLY 1 APPLICATION TOPICALLY 2 (TWO) TIMES A DAY AS DIRECTED, Disp: 60 g, Rfl: 5  •  rosuvastatin (CRESTOR) 5 mg tablet, Take 1 tablet (5 mg total) by mouth daily, Disp: 90 tablet, Rfl: 3  •  traZODone (DESYREL) 50 mg tablet, TAKE 1 TABLET DAILY AT BEDTIME AS NEEDED FOR SLEEP, Disp: 90 tablet, Rfl: 3     Review of Systems   Constitutional: Negative for fatigue. HENT: Negative for trouble swallowing. Respiratory: Negative for chest tightness, shortness of breath and wheezing. Cardiovascular: Negative for chest pain, palpitations and leg swelling. Gastrointestinal: Negative for abdominal pain, constipation and diarrhea. Endocrine: Negative for polyuria. Genitourinary: Negative for difficulty urinating and flank pain. Musculoskeletal: Negative for arthralgias and myalgias. Skin: Negative for rash. Psychiatric/Behavioral: Negative for sleep disturbance. Objective:      /86 (BP Location: Left arm, Patient Position: Sitting, Cuff Size: Large)   Pulse 96   Temp (!) 97.4 °F (36.3 °C) (Tympanic)   Wt 134 kg (294 lb 6.4 oz)   SpO2 97%   BMI 47.52 kg/m²          Physical Exam  Vitals reviewed. Constitutional:       Appearance: She is well-developed. She is obese. HENT:      Head: Normocephalic. Cardiovascular:      Rate and Rhythm: Regular rhythm. Pulses: no weak pulses          Dorsalis pedis pulses are 2+ on the right side and 2+ on the left side. Posterior tibial pulses are 2+ on the right side and 2+ on the left side. Heart sounds: Normal heart sounds. No murmur heard. Pulmonary:      Effort: No respiratory distress. Breath sounds: No wheezing or rales. Abdominal:      General: There is no distension. Tenderness: There is no abdominal tenderness. Musculoskeletal:      Right lower leg: No edema. Left lower leg: No edema. Feet:      Right foot:      Skin integrity: No ulcer, skin breakdown, erythema, warmth, callus or dry skin. Left foot:      Skin integrity: No ulcer, skin breakdown, erythema, warmth, callus or dry skin. Skin:     Findings: No erythema or rash. Neurological:      Mental Status: She is alert and oriented to person, place, and time.            Ailyn Friend, MDDiabetic Foot Exam    Patient's shoes and socks removed. Right Foot/Ankle   Right Foot Inspection  Skin Exam: skin normal and skin intact. No dry skin, no warmth, no callus, no erythema, no maceration, no abnormal color, no pre-ulcer, no ulcer and no callus. Toe Exam: right toe deformity. Sensory   Monofilament testing: intact    Vascular  Capillary refills: < 3 seconds  The right DP pulse is 2+. The right PT pulse is 2+. Left Foot/Ankle  Left Foot Inspection  Skin Exam: skin normal and skin intact. No dry skin, no warmth, no erythema, no maceration, normal color, no pre-ulcer, no ulcer and no callus. Toe Exam: left toe deformity. Sensory   Monofilament testing: intact    Vascular  Capillary refills: < 3 seconds  The left DP pulse is 2+. The left PT pulse is 2+.      Assign Risk Category  Deformity present  No loss of protective sensation  No weak pulses  Risk: 0

## 2023-08-10 ENCOUNTER — HOSPITAL ENCOUNTER (OUTPATIENT)
Dept: MAMMOGRAPHY | Facility: CLINIC | Age: 68
Discharge: HOME | End: 2023-08-10
Payer: COMMERCIAL

## 2023-08-10 ENCOUNTER — HOSPITAL ENCOUNTER (OUTPATIENT)
Dept: ULTRASOUND IMAGING | Facility: CLINIC | Age: 68
Discharge: HOME | End: 2023-08-10
Payer: COMMERCIAL

## 2023-08-10 DIAGNOSIS — R92.8 ABNORMAL SCREENING MAMMOGRAM: ICD-10-CM

## 2023-08-10 DIAGNOSIS — M79.7 FIBROMYALGIA: Primary | ICD-10-CM

## 2023-08-10 PROCEDURE — 76642 ULTRASOUND BREAST LIMITED: CPT

## 2023-08-10 PROCEDURE — 77065 DX MAMMO INCL CAD UNI: CPT

## 2023-08-10 PROCEDURE — G0279 TOMOSYNTHESIS, MAMMO: HCPCS

## 2023-08-10 RX ORDER — DULOXETIN HYDROCHLORIDE 30 MG/1
30 CAPSULE, DELAYED RELEASE ORAL DAILY
Qty: 30 CAPSULE | Refills: 5 | Status: SHIPPED | OUTPATIENT
Start: 2023-08-10

## 2023-08-11 NOTE — PROGRESS NOTES
Try cymbalta for chronic pain.   Problem List Items Addressed This Visit        Other    Fibromyalgia - Primary    Relevant Medications    DULoxetine (CYMBALTA) 30 mg delayed release capsule

## 2023-08-22 ENCOUNTER — APPOINTMENT (OUTPATIENT)
Dept: RADIOLOGY | Facility: AMBULARY SURGERY CENTER | Age: 68
End: 2023-08-22
Attending: ORTHOPAEDIC SURGERY
Payer: COMMERCIAL

## 2023-08-22 ENCOUNTER — OFFICE VISIT (OUTPATIENT)
Dept: OBGYN CLINIC | Facility: CLINIC | Age: 68
End: 2023-08-22
Payer: COMMERCIAL

## 2023-08-22 VITALS
SYSTOLIC BLOOD PRESSURE: 159 MMHG | WEIGHT: 293 LBS | HEIGHT: 66 IN | DIASTOLIC BLOOD PRESSURE: 86 MMHG | BODY MASS INDEX: 47.09 KG/M2

## 2023-08-22 DIAGNOSIS — M21.41 ACQUIRED PES PLANOVALGUS OF RIGHT FOOT: Primary | ICD-10-CM

## 2023-08-22 DIAGNOSIS — Z01.89 ENCOUNTER FOR LOWER EXTREMITY COMPARISON IMAGING STUDY: ICD-10-CM

## 2023-08-22 DIAGNOSIS — M21.41 ACQUIRED PES PLANOVALGUS OF RIGHT FOOT: ICD-10-CM

## 2023-08-22 PROCEDURE — 73600 X-RAY EXAM OF ANKLE: CPT

## 2023-08-22 PROCEDURE — 99213 OFFICE O/P EST LOW 20 MIN: CPT | Performed by: ORTHOPAEDIC SURGERY

## 2023-08-22 PROCEDURE — 73610 X-RAY EXAM OF ANKLE: CPT

## 2023-08-22 NOTE — PROGRESS NOTES
Hayder Vicente M.D. Attending, Orthopaedic Surgery  Foot and [facility]      ORTHOPAEDIC FOOT AND ANKLE CLINIC VISIT     Assessment:     Encounter Diagnoses   Name Primary? • Acquired pes planovalgus of right foot Yes   • Encounter for lower extremity comparison imaging study             Plan:   · The patient verbalized understanding of exam findings and treatment plan. We engaged in the shared decision-making process and treatment options were discussed at length with the patient. Surgical and conservative management discussed today along with risks and benefits. · Patient is a f/u right valgus ankle nonunion and broken hardware  · Her xray today demonstrates stable alignment without signs of additional broken hardware or worsening hardware failure/shifting  · She has had 2 serial xrays 6 months apart that show her ankle has remained unchanged and there is no further ankle collapse. · Continue AFO brace, custom diabetic shoes, custom orthotics - she sees [facility] clinic   • If it worsens, she will require a TTC arthrodesis   Return if symptoms worsen or fail to improve. History of Present Illness:   Chief Complaint:   Chief Complaint   Patient presents with   • Follow-up     F/u patient is doing well and only having pain here and there. She is going for a total ankle  series      Obinna Dawkins is a 76 y.o. female who is being seen in follow-up for Right ankle pain. When we last saw she we recommended WBAT in AFO brace. Pain has remained the same. Residual pain is localized at anterior and lateral ankle with minimal radiating and described as sharp and severe.       Pain/symptom timing:  Worse during the day when active  Pain/symptom context:  Worse with activites and work  Pain/symptom modifying factors:  Rest makes better, activities make worse  Pain/symptom associated signs/symptoms: none    Prior treatment   · NSAIDsYes   · Injections No   · Bracing/Orthotics Yes · Physical Therapy Yes     Orthopedic Surgical History:   See below    Past Medical, Surgical and Social History:  Past Medical History:  has a past medical history of Anemia, Dysfunction of eustachian tube, Endometriosis, Fibromyalgia, Hyperlipidemia, IBS (irritable bowel syndrome), Pain of left hand (2023), and Vitamin D deficiency. Problem List: does not have any pertinent problems on file. Past Surgical History:  has a past surgical history that includes Ankle surgery (N/A);  section; Joint replacement; Lumbar epidural injection (2005); pr colonoscopy flx dx w/collj spec when pfrmd (N/A, 2017); and Total hip arthroplasty (Left, 2013). Family History: family history includes No Known Problems in her daughter, maternal aunt, maternal aunt, maternal aunt, maternal aunt, maternal grandfather, maternal grandmother, paternal grandfather, paternal grandmother, sister, and son; Other in her father and mother. Social History:  reports that she has never smoked. She has never used smokeless tobacco. She reports current alcohol use. She reports that she does not use drugs. Current Medications: has a current medication list which includes the following prescription(s): amoxicillin, vitamin d, duloxetine, fexofenadine, fluticasone, onetouch verio, onetouch delica plus [id number], lisinopril, metformin, naproxen sodium, nystatin, rosuvastatin, and trazodone. Allergies: is allergic to augmentin [amoxicillin-pot clavulanate], azithromycin, doxycycline, erythromycin, lodine [etodolac], lorazepam, and sulfa antibiotics.      Review of Systems:  General- denies fever/chills  HEENT- denies hearing loss or sore throat  Eyes- denies eye pain or visual disturbances, denies red eyes  Respiratory- denies cough or SOB  Cardio- denies chest pain or palpitations  GI- denies abdominal pain  Endocrine- denies urinary frequency  Urinary- denies pain with urination  Musculoskeletal- Negative except noted above  Skin- denies rashes or wounds  Neurological- denies dizziness or headache  Psychiatric- denies anxiety or difficulty concentrating    Physical Exam:   /86   Ht 5' 6" (1.676 m)   Wt 134 kg (295 lb)   BMI 47.61 kg/m²   General/Constitutional: No apparent distress: well-nourished and well developed. Eyes: normal ocular motion  Lymphatic: No appreciable lymphadenopathy  Respiratory: Non-labored breathing  Vascular: No edema, swelling or tenderness, except as noted in detailed exam.  Integumentary: No impressive skin lesions present, except as noted in detailed exam.  Neuro: No ataxia or tremors noted  Psych: Normal mood and affect, oriented to person, place and time. Appropriate affect. Musculoskeletal: Normal, except as noted in detailed exam and in HPI. Examination    Right    Gait Antalgic   Musculoskeletal No TTP    Skin Normal.  Well-healed incisions. Nails Normal    Range of Motion  None    Stability Stable    Muscle Strength   5/5 EHL  5/5 FHL    Neurologic Normal    Sensation  Intact to light touch throughout sural, saphenous, superficial peroneal, deep peroneal and medial/lateral plantar nerve distributions. [place] 5.07 filament (10g) testing deferred. Cardiovascular Brisk capillary refill < 2 seconds,intact DP and PT pulses    Special Tests None      Imaging Studies:       2 views of the Right ankle and 3 views of the foot were obtained, reviewed and interpreted independently which demonstrate stable pes planovalgus without interval collapse when compared to Xrays obtained over the past 12 months. Reviewed by me personally. Anjel Forde. Lachman, MD  Foot & Ankle Surgery   Department of [address] [place] [address] personally performed the service. Daren Landsman. Lachman, MD

## 2023-08-23 DIAGNOSIS — M79.7 FIBROMYALGIA: ICD-10-CM

## 2023-08-23 RX ORDER — DULOXETIN HYDROCHLORIDE 30 MG/1
30 CAPSULE, DELAYED RELEASE ORAL DAILY
Qty: 30 CAPSULE | Refills: 5 | OUTPATIENT
Start: 2023-08-23

## 2023-08-24 ENCOUNTER — TELEPHONE (OUTPATIENT)
Dept: FAMILY MEDICINE CLINIC | Facility: CLINIC | Age: 68
End: 2023-08-24

## 2023-08-24 NOTE — TELEPHONE ENCOUNTER
Pt wanted to go over med refill rejection. I contacted [facility] and they hung up the phone after I stated where  I was calling form. Upon calling again nobody had answered hence, I left a message advising for someone to CB.

## 2023-08-28 DIAGNOSIS — M79.7 FIBROMYALGIA: ICD-10-CM

## 2023-08-29 RX ORDER — DULOXETIN HYDROCHLORIDE 30 MG/1
30 CAPSULE, DELAYED RELEASE ORAL DAILY
Qty: 90 CAPSULE | Refills: 3 | Status: SHIPPED | OUTPATIENT
Start: 2023-08-29

## 2023-09-11 ENCOUNTER — APPOINTMENT (OUTPATIENT)
Dept: LAB | Facility: IMAGING CENTER | Age: 68
End: 2023-09-11
Payer: COMMERCIAL

## 2023-09-11 DIAGNOSIS — D72.829 LEUKOCYTOSIS, UNSPECIFIED TYPE: ICD-10-CM

## 2023-09-11 LAB
ALBUMIN SERPL BCP-MCNC: 4.1 G/DL (ref 3.5–5)
ALP SERPL-CCNC: 51 U/L (ref 34–104)
ALT SERPL W P-5'-P-CCNC: 22 U/L (ref 7–52)
ANION GAP SERPL CALCULATED.3IONS-SCNC: 11 MMOL/L
AST SERPL W P-5'-P-CCNC: 22 U/L (ref 13–39)
BASOPHILS # BLD AUTO: 0.07 THOUSANDS/ÂΜL (ref 0–0.1)
BASOPHILS NFR BLD AUTO: 1 % (ref 0–1)
BILIRUB SERPL-MCNC: 0.6 MG/DL (ref 0.2–1)
BUN SERPL-MCNC: 8 MG/DL (ref 5–25)
CALCIUM SERPL-MCNC: 9.4 MG/DL (ref 8.4–10.2)
CHLORIDE SERPL-SCNC: 101 MMOL/L (ref 96–108)
CO2 SERPL-SCNC: 26 MMOL/L (ref 21–32)
CREAT SERPL-MCNC: 0.76 MG/DL (ref 0.6–1.3)
EOSINOPHIL # BLD AUTO: 0.21 THOUSAND/ÂΜL (ref 0–0.61)
EOSINOPHIL NFR BLD AUTO: 2 % (ref 0–6)
ERYTHROCYTE [DISTWIDTH] IN BLOOD BY AUTOMATED COUNT: 12.8 % (ref 11.6–15.1)
GFR SERPL CREATININE-BSD FRML MDRD: 80 ML/MIN/1.73SQ M
GLUCOSE SERPL-MCNC: 167 MG/DL (ref 65–140)
HCT VFR BLD AUTO: 44.6 % (ref 34.8–46.1)
HGB BLD-MCNC: 14.6 G/DL (ref 11.5–15.4)
IMM GRANULOCYTES # BLD AUTO: 0.02 THOUSAND/UL (ref 0–0.2)
IMM GRANULOCYTES NFR BLD AUTO: 0 % (ref 0–2)
LYMPHOCYTES # BLD AUTO: 2.14 THOUSANDS/ÂΜL (ref 0.6–4.47)
LYMPHOCYTES NFR BLD AUTO: 24 % (ref 14–44)
MCH RBC QN AUTO: 30.6 PG (ref 26.8–34.3)
MCHC RBC AUTO-ENTMCNC: 32.7 G/DL (ref 31.4–37.4)
MCV RBC AUTO: 94 FL (ref 82–98)
MONOCYTES # BLD AUTO: 0.53 THOUSAND/ÂΜL (ref 0.17–1.22)
MONOCYTES NFR BLD AUTO: 6 % (ref 4–12)
NEUTROPHILS # BLD AUTO: 6.01 THOUSANDS/ÂΜL (ref 1.85–7.62)
NEUTS SEG NFR BLD AUTO: 67 % (ref 43–75)
NRBC BLD AUTO-RTO: 0 /100 WBCS
PLATELET # BLD AUTO: 294 THOUSANDS/UL (ref 149–390)
PMV BLD AUTO: 11.4 FL (ref 8.9–12.7)
POTASSIUM SERPL-SCNC: 4 MMOL/L (ref 3.5–5.3)
PROT SERPL-MCNC: 7.3 G/DL (ref 6.4–8.4)
RBC # BLD AUTO: 4.77 MILLION/UL (ref 3.81–5.12)
SODIUM SERPL-SCNC: 138 MMOL/L (ref 135–147)
WBC # BLD AUTO: 8.98 THOUSAND/UL (ref 4.31–10.16)

## 2023-09-11 PROCEDURE — 85025 COMPLETE CBC W/AUTO DIFF WBC: CPT

## 2023-09-11 PROCEDURE — 80053 COMPREHEN METABOLIC PANEL: CPT

## 2023-09-11 PROCEDURE — 36415 COLL VENOUS BLD VENIPUNCTURE: CPT

## 2023-09-18 LAB
LEFT EYE DIABETIC RETINOPATHY: NORMAL
RIGHT EYE DIABETIC RETINOPATHY: NORMAL

## 2023-09-18 PROCEDURE — 2023F DILAT RTA XM W/O RTNOPTHY: CPT | Performed by: ORTHOPAEDIC SURGERY

## 2023-10-16 ENCOUNTER — HOSPITAL ENCOUNTER (OUTPATIENT)
Dept: RADIOLOGY | Age: 68
Discharge: HOME | End: 2023-10-16
Payer: COMMERCIAL

## 2023-10-16 DIAGNOSIS — Z78.0 POST-MENOPAUSAL: ICD-10-CM

## 2023-10-16 PROCEDURE — 77080 DXA BONE DENSITY AXIAL: CPT

## 2023-10-26 DIAGNOSIS — M79.7 FIBROMYALGIA: ICD-10-CM

## 2023-10-26 RX ORDER — GABAPENTIN 300 MG/1
CAPSULE ORAL
Qty: 360 CAPSULE | Refills: 3 | Status: SHIPPED | OUTPATIENT
Start: 2023-10-26

## 2023-11-07 ENCOUNTER — TELEPHONE (OUTPATIENT)
Dept: FAMILY MEDICINE CLINIC | Facility: CLINIC | Age: 68
End: 2023-11-07

## 2023-11-07 NOTE — TELEPHONE ENCOUNTER
1:15pm: PT says that she went to [facility] to obtain diabetic custom shoes, and was told that the office notes from 7/10/23 "have  for Medicare". I was given verbal authorization by the patient to call [facility]. Per [address] [facility] ([phone]): Message sent to [facility], Diabetic Shoe Specialist, to advise on what our office needs to do because the Cosign Notes and Diabetic Statement from 7/10/23 that were signed by PCP had  on 10/8/23. [facility] will contact us.

## 2023-11-14 ENCOUNTER — TELEPHONE (OUTPATIENT)
Dept: FAMILY MEDICINE CLINIC | Facility: CLINIC | Age: 68
End: 2023-11-14

## 2023-11-14 NOTE — TELEPHONE ENCOUNTER
3:45pm: Jeremy Joyce at [facility] ([phone]): Diabetic Statement & Cosign Podiatry Note that were signed by PCP on 7/10/23  on 10/8/23; Office Note is due to  1/10/24. These forms will need to be re-completed so that they can start the process again w/getting custom diabetic shoes for the patient. Their next available appt is 2024. She will send an email to [facility], Diabetic [employer], to fax over the forms. Called and spoke w/PT, relayed above information. PT will call [facility] tomorrow for clarification as to why she has to wait until February to get her shoes. PT will call us back to update us.

## 2023-11-22 NOTE — TELEPHONE ENCOUNTER
Pt called LVM stating the following below. Pt called LVM stating "did Dr Elizabeth Nevarez fill out a form for [facility] for me to be able to order diabetic shoes.

## 2024-01-08 ENCOUNTER — APPOINTMENT (OUTPATIENT)
Dept: LAB | Facility: IMAGING CENTER | Age: 69
End: 2024-01-08
Payer: COMMERCIAL

## 2024-01-08 ENCOUNTER — RA CDI HCC (OUTPATIENT)
Dept: OTHER | Facility: HOSPITAL | Age: 69
End: 2024-01-08

## 2024-01-08 DIAGNOSIS — F33.42 MDD (MAJOR DEPRESSIVE DISORDER), RECURRENT, IN FULL REMISSION (HCC): ICD-10-CM

## 2024-01-08 DIAGNOSIS — E55.9 VITAMIN D DEFICIENCY: ICD-10-CM

## 2024-01-08 DIAGNOSIS — E78.00 PURE HYPERCHOLESTEROLEMIA: ICD-10-CM

## 2024-01-08 DIAGNOSIS — I10 ESSENTIAL HYPERTENSION: ICD-10-CM

## 2024-01-08 DIAGNOSIS — E11.65 TYPE 2 DIABETES MELLITUS WITH HYPERGLYCEMIA, WITHOUT LONG-TERM CURRENT USE OF INSULIN (HCC): ICD-10-CM

## 2024-01-08 LAB
25(OH)D3 SERPL-MCNC: 58.8 NG/ML (ref 30–100)
ALBUMIN SERPL BCP-MCNC: 4.1 G/DL (ref 3.5–5)
ALP SERPL-CCNC: 42 U/L (ref 34–104)
ALT SERPL W P-5'-P-CCNC: 21 U/L (ref 7–52)
ANION GAP SERPL CALCULATED.3IONS-SCNC: 12 MMOL/L
AST SERPL W P-5'-P-CCNC: 28 U/L (ref 13–39)
BASOPHILS # BLD AUTO: 0.05 THOUSANDS/ÂΜL (ref 0–0.1)
BASOPHILS NFR BLD AUTO: 1 % (ref 0–1)
BILIRUB SERPL-MCNC: 0.47 MG/DL (ref 0.2–1)
BUN SERPL-MCNC: 9 MG/DL (ref 5–25)
CALCIUM SERPL-MCNC: 9.7 MG/DL (ref 8.4–10.2)
CHLORIDE SERPL-SCNC: 102 MMOL/L (ref 96–108)
CHOLEST SERPL-MCNC: 123 MG/DL
CO2 SERPL-SCNC: 27 MMOL/L (ref 21–32)
CREAT SERPL-MCNC: 0.68 MG/DL (ref 0.6–1.3)
CREAT UR-MCNC: 124.9 MG/DL
EOSINOPHIL # BLD AUTO: 0.47 THOUSAND/ÂΜL (ref 0–0.61)
EOSINOPHIL NFR BLD AUTO: 5 % (ref 0–6)
ERYTHROCYTE [DISTWIDTH] IN BLOOD BY AUTOMATED COUNT: 12.5 % (ref 11.6–15.1)
EST. AVERAGE GLUCOSE BLD GHB EST-MCNC: 163 MG/DL
GFR SERPL CREATININE-BSD FRML MDRD: 90 ML/MIN/1.73SQ M
GLUCOSE P FAST SERPL-MCNC: 162 MG/DL (ref 65–99)
HBA1C MFR BLD: 7.3 %
HCT VFR BLD AUTO: 44.2 % (ref 34.8–46.1)
HDLC SERPL-MCNC: 45 MG/DL
HGB BLD-MCNC: 13.9 G/DL (ref 11.5–15.4)
IMM GRANULOCYTES # BLD AUTO: 0.04 THOUSAND/UL (ref 0–0.2)
IMM GRANULOCYTES NFR BLD AUTO: 0 % (ref 0–2)
LDLC SERPL CALC-MCNC: 31 MG/DL (ref 0–100)
LYMPHOCYTES # BLD AUTO: 2.67 THOUSANDS/ÂΜL (ref 0.6–4.47)
LYMPHOCYTES NFR BLD AUTO: 29 % (ref 14–44)
MCH RBC QN AUTO: 29 PG (ref 26.8–34.3)
MCHC RBC AUTO-ENTMCNC: 31.4 G/DL (ref 31.4–37.4)
MCV RBC AUTO: 92 FL (ref 82–98)
MICROALBUMIN UR-MCNC: 14.9 MG/L
MICROALBUMIN/CREAT 24H UR: 12 MG/G CREATININE (ref 0–30)
MONOCYTES # BLD AUTO: 0.65 THOUSAND/ÂΜL (ref 0.17–1.22)
MONOCYTES NFR BLD AUTO: 7 % (ref 4–12)
NEUTROPHILS # BLD AUTO: 5.21 THOUSANDS/ÂΜL (ref 1.85–7.62)
NEUTS SEG NFR BLD AUTO: 58 % (ref 43–75)
NRBC BLD AUTO-RTO: 0 /100 WBCS
PLATELET # BLD AUTO: 347 THOUSANDS/UL (ref 149–390)
PMV BLD AUTO: 11.3 FL (ref 8.9–12.7)
POTASSIUM SERPL-SCNC: 4.2 MMOL/L (ref 3.5–5.3)
PROT SERPL-MCNC: 7.3 G/DL (ref 6.4–8.4)
RBC # BLD AUTO: 4.79 MILLION/UL (ref 3.81–5.12)
SODIUM SERPL-SCNC: 141 MMOL/L (ref 135–147)
TRIGL SERPL-MCNC: 235 MG/DL
WBC # BLD AUTO: 9.09 THOUSAND/UL (ref 4.31–10.16)

## 2024-01-08 PROCEDURE — 82570 ASSAY OF URINE CREATININE: CPT

## 2024-01-08 PROCEDURE — 82043 UR ALBUMIN QUANTITATIVE: CPT

## 2024-01-08 PROCEDURE — 36415 COLL VENOUS BLD VENIPUNCTURE: CPT

## 2024-01-08 PROCEDURE — 80061 LIPID PANEL: CPT

## 2024-01-08 PROCEDURE — 83036 HEMOGLOBIN GLYCOSYLATED A1C: CPT

## 2024-01-08 PROCEDURE — 80053 COMPREHEN METABOLIC PANEL: CPT

## 2024-01-08 PROCEDURE — 82306 VITAMIN D 25 HYDROXY: CPT

## 2024-01-08 PROCEDURE — 85025 COMPLETE CBC W/AUTO DIFF WBC: CPT

## 2024-01-12 ENCOUNTER — OFFICE VISIT (OUTPATIENT)
Dept: FAMILY MEDICINE CLINIC | Facility: CLINIC | Age: 69
End: 2024-01-12
Payer: COMMERCIAL

## 2024-01-12 VITALS
OXYGEN SATURATION: 97 % | BODY MASS INDEX: 44.84 KG/M2 | WEIGHT: 277.8 LBS | DIASTOLIC BLOOD PRESSURE: 82 MMHG | SYSTOLIC BLOOD PRESSURE: 132 MMHG | HEART RATE: 103 BPM

## 2024-01-12 DIAGNOSIS — K21.9 GASTROESOPHAGEAL REFLUX DISEASE WITHOUT ESOPHAGITIS: ICD-10-CM

## 2024-01-12 DIAGNOSIS — M79.641 PAIN IN BOTH HANDS: ICD-10-CM

## 2024-01-12 DIAGNOSIS — E11.65 TYPE 2 DIABETES MELLITUS WITH HYPERGLYCEMIA, WITHOUT LONG-TERM CURRENT USE OF INSULIN (HCC): ICD-10-CM

## 2024-01-12 DIAGNOSIS — I10 ESSENTIAL HYPERTENSION: ICD-10-CM

## 2024-01-12 DIAGNOSIS — Z12.31 ENCOUNTER FOR SCREENING MAMMOGRAM FOR MALIGNANT NEOPLASM OF BREAST: Primary | ICD-10-CM

## 2024-01-12 DIAGNOSIS — M79.642 PAIN IN BOTH HANDS: ICD-10-CM

## 2024-01-12 DIAGNOSIS — Z23 NEED FOR INFLUENZA VACCINATION: ICD-10-CM

## 2024-01-12 DIAGNOSIS — E55.9 VITAMIN D DEFICIENCY: ICD-10-CM

## 2024-01-12 DIAGNOSIS — E66.01 MORBID OBESITY WITH BMI OF 45.0-49.9, ADULT (HCC): ICD-10-CM

## 2024-01-12 DIAGNOSIS — F33.42 MDD (MAJOR DEPRESSIVE DISORDER), RECURRENT, IN FULL REMISSION (HCC): ICD-10-CM

## 2024-01-12 PROCEDURE — 90662 IIV NO PRSV INCREASED AG IM: CPT

## 2024-01-12 PROCEDURE — G0008 ADMIN INFLUENZA VIRUS VAC: HCPCS

## 2024-01-12 PROCEDURE — G0439 PPPS, SUBSEQ VISIT: HCPCS | Performed by: FAMILY MEDICINE

## 2024-01-12 PROCEDURE — 99214 OFFICE O/P EST MOD 30 MIN: CPT | Performed by: FAMILY MEDICINE

## 2024-01-12 NOTE — PROGRESS NOTES
Diabetic Foot Exam    Patient's shoes and socks removed.    Right Foot/Ankle   Right Foot Inspection  Skin Exam: skin normal, skin intact and warmth. No dry skin, no callus, no erythema, no maceration, no abnormal color, no pre-ulcer, no ulcer and no callus.     Toe Exam: ROM and strength within normal limits and right toe deformity. Erythema    Sensory   Monofilament testing: intact    Vascular  Capillary refills: < 3 seconds  The right DP pulse is 2+. The right PT pulse is 2+.     Left Foot/Ankle  Left Foot Inspection  Skin Exam: skin normal, skin intact and warmth. No dry skin, no erythema, no maceration, normal color, no pre-ulcer, no ulcer and no callus.     Toe Exam: ROM and strength within normal limits and left toe deformity (She has significant valgus deformity bilateral ankles and feet.). No erythema.     Sensory   Monofilament testing: intact    Vascular  Capillary refills: < 3 seconds  The left DP pulse is 2+. The left PT pulse is 2+.     Assign Risk Category  No deformity present  No loss of protective sensation  No weak pulses  Risk: 0   Assessment and Plan:     Problem List Items Addressed This Visit        Digestive    Gastroesophageal reflux disease without esophagitis       Endocrine    Type 2 diabetes mellitus with hyperglycemia, without long-term current use of insulin ([facility])       Lab Results   Component Value Date    HGBA1C 7.3 (H) 01/08/2024   A1c has bumped up to 7.3 despite some recent weight loss.  I am going to place the patient on Mounjaro.  I have consulted clinical pharmacy to help with any cost issues as well as titration of this medication.  She has had significant nausea in the past with Trulicity so we will need to be watchful of this.         Relevant Medications    tirzepatide (Mounjaro) 2.5 MG/0.5ML    tirzepatide (Mounjaro) 5 MG/0.5ML (Start on 2/11/2024)    Other Relevant Orders    Ambulatory referral to clinical pharmacy    Comprehensive metabolic panel    Hemoglobin A1c (w/out  EAG) ([facility] ONLY)    Lipid Panel with Direct LDL reflex       Cardiovascular and Mediastinum    Essential hypertension     She was elevated upon arrival but came down throughout the visit.            Other    Morbid obesity with BMI of 45.0-49.9, adult (HCC)    Vitamin D deficiency    MDD (major depressive disorder), recurrent, in full remission (HCC)    Pain in both hands     She is having some bilateral hand pain and seems to be worsening the past 2 to 3 months or so.  I am going to have her get hand x-rays.  She has some arthritic changes of the hands.   is intact.  She can still take Aleve as needed as that seems to be effective.         Relevant Orders    XR hand 3+ vw left    XR hand 3+ vw right   Other Visit Diagnoses     Encounter for screening mammogram for malignant neoplasm of breast    -  Primary    Relevant Orders    Mammo screening bilateral w 3d & cad    Need for influenza vaccination        Relevant Orders    influenza vaccine, high-dose, PF 0.7 mL (FLUZONE HIGH-DOSE) (Completed)           Preventive health issues were discussed with patient, and age appropriate screening tests were ordered as noted in patient's After Visit Summary.  Personalized health advice and appropriate referrals for health education or preventive services given if needed, as noted in patient's After Visit Summary.     History of Present Illness:     Patient presents for a Medicare Wellness Visit    Patient presents today for follow-up of chronic health issues.  She is doing pretty well.  A1c has bumped up to 7.3 which is frustrating as she has lost some weight.  She has had issues in the past with SGLT2 inhibitors and some nausea with Trulicity.  She has no problems of chest pain or shortness of breath.  She is having some pain in bilateral hands ever since October.  There is been no acute injury.  She does not note significant swelling or weakness.       Patient Care Team:  Eric Conley Jr., MD as PCP - General  [facility]  MD Jeffery Lo MD (Urogynecology)  Shaheed Marcano DPM (Podiatry)  Job Lo MD as Endoscopist  [facility] (Ophthalmology)  Margarita Sebastian, Pharmacist as Pharmacist (Pharmacy)     Review of Systems:     Review of Systems   Constitutional:  Negative for appetite change, chills, fatigue, fever and unexpected weight change.   HENT:  Positive for postnasal drip and sinus pressure. Negative for trouble swallowing.    Eyes:  Negative for visual disturbance.   Respiratory:  Negative for cough, chest tightness, shortness of breath and wheezing.    Cardiovascular:  Negative for chest pain, palpitations and leg swelling.   Gastrointestinal:  Negative for abdominal distention, abdominal pain, blood in stool, constipation and diarrhea.   Endocrine: Negative for polyuria.   Genitourinary:  Negative for difficulty urinating and flank pain.   Musculoskeletal:  Positive for arthralgias, back pain and gait problem. Negative for myalgias.   Skin:  Negative for rash.   Neurological:  Negative for dizziness and light-headedness.   Hematological:  Negative for adenopathy. Does not bruise/bleed easily.   Psychiatric/Behavioral:  Negative for dysphoric mood and sleep disturbance. The patient is not nervous/anxious.         Problem List:     Patient Active Problem List   Diagnosis   • Type 2 diabetes mellitus with hyperglycemia, without long-term current use of insulin ([facility])   • Fibromyalgia   • Neurologic gait dysfunction   • Pure hypercholesterolemia   • Essential hypertension   • Irritable bowel syndrome   • Lower back pain   • Morbid obesity with BMI of 45.0-49.9, adult ([facility])   • Rosacea   • Urinary incontinence   • Vitamin D deficiency   • Congenital valgus deformity of foot   • Primary localized osteoarthrosis of right ankle and foot   • Osteoarthritis of hip   • Primary osteoarthritis of right knee   • Tibialis tendinitis   • History of total hip replacement   • Insomnia due to medical condition   • Other  hemorrhoids   • Lateral epicondylitis of right elbow   • Gastroesophageal reflux disease without esophagitis   • Myalgia   • MDD (major depressive disorder), recurrent, in full remission (HCC)   • Acquired pes planovalgus of right foot   • Situational  anxiety   • Pain in both hands      Past Medical and Surgical History:     Past Medical History:   Diagnosis Date   • Anemia     last assessed: 2015   • Dysfunction of eustachian tube     unspecified laterality; last assessed: 2013   • Endometriosis    • Fibromyalgia    • Hyperlipidemia    • IBS (irritable bowel syndrome)    • Pain of left hand 2023   • Vitamin D deficiency      Past Surgical History:   Procedure Laterality Date   • ANKLE SURGERY N/A      left ankle,  right ankle,  irght ankle-extensive surgery   •  SECTION      x2   • JOINT REPLACEMENT      hip   • LUMBAR EPIDURAL INJECTION  12/2005    x 1   • IN COLONOSCOPY FLX DX W/COLLJ SPEC WHEN PFRMD N/A 2017    Procedure: COLONOSCOPY with polypectomy;  Surgeon: Job Lo MD;  Location: [facility];  Service: Gastroenterology   • TOTAL HIP ARTHROPLASTY Left 2013      Family History:     Family History   Problem Relation Age of Onset   • Other Mother         CABG   • Other Father         gangrene   • No Known Problems Sister    • No Known Problems Daughter    • No Known Problems Maternal Grandmother    • No Known Problems Maternal Grandfather    • No Known Problems Paternal Grandmother    • No Known Problems Paternal Grandfather    • No Known Problems Maternal Aunt    • No Known Problems Maternal Aunt    • No Known Problems Maternal Aunt    • No Known Problems Maternal Aunt    • No Known Problems Son    • Colon cancer Neg Hx       Social History:     Social History     Socioeconomic History   • Marital status: /Civil Union     Spouse name: None   • Number of children: None   • Years of education: None   • Highest education level: None   Occupational History   •  None   Tobacco Use   • Smoking status: Never   • Smokeless tobacco: Never   Vaping Use   • Vaping status: Never Used   Substance and Sexual Activity   • Alcohol use: Yes     Comment: social, 5 drinks weekly (as per [employer])   • Drug use: No   • Sexual activity: Yes     Partners: Male   Other Topics Concern   • None   Social History Narrative   • None     Social Determinants of Health     Financial Resource Strain: Low Risk  (1/6/2024)    Overall Financial Resource Strain (CARDIA)    • Difficulty of Paying Living Expenses: Not hard at all   Food Insecurity: Not on file   Transportation Needs: No Transportation Needs (1/6/2024)    PRAPARE - Transportation    • Lack of Transportation (Medical): No    • Lack of Transportation (Non-Medical): No   Physical Activity: Not on file   Stress: Not on file   Social Connections: Not on file   Intimate Partner Violence: Not on file   Housing Stability: Not on file      Medications and Allergies:     Current Outpatient Medications   Medication Sig Dispense Refill   • amoxicillin (AMOXIL) 500 mg capsule TAKE 4 CAPS 1 HOUR PRIOR TO DENTAL TREATMENT     • Cholecalciferol (VITAMIN D) 2000 units tablet Take 2,000 Units by mouth daily     • fexofenadine (ALLEGRA) 180 MG tablet Take 180 mg by mouth daily     • fluticasone (FLONASE) 50 mcg/act nasal spray 1 spray into each nostril daily     • gabapentin (NEURONTIN) 300 mg capsule Take 1 tablet in the morning and 2 at bedtime.  Take an additional dose in the afternoon as needed. 360 capsule 3   • glucose blood (OneTouch Verio) test strip Use 1 each daily Use as instructed to check blood sugar -  - or as preferred by insurance coverage 100 each 1   • Lancets (OneTouch Delica Plus [id number]) MISC Use 1 each daily To check blood sugar  - or as preferred by insurance coverage 100 each 1   • lisinopril (ZESTRIL) 40 mg tablet Take 1 tablet (40 mg total) by mouth daily 90 tablet 3   • metFORMIN (GLUCOPHAGE-XR) 500 mg 24 hr tablet TAKE 4 TABLETS  DAILY FOR DIABETES 360 tablet 3   • nystatin (MYCOSTATIN) powder APPLY 1 APPLICATION TOPICALLY 2 (TWO) TIMES A DAY AS DIRECTED 60 g 5   • rosuvastatin (CRESTOR) 5 mg tablet Take 1 tablet (5 mg total) by mouth daily 90 tablet 3   • tirzepatide (Mounjaro) 2.5 MG/0.5ML Inject 0.5 mL (2.5 mg total) under the skin every 7 days 2 mL 0   • [START ON 2/11/2024] tirzepatide (Mounjaro) 5 MG/0.5ML Inject 0.5 mL (5 mg total) under the skin every 7 days Do not start before February 11, 2024. 6 mL 1   • traZODone (DESYREL) 50 mg tablet TAKE 1 TABLET DAILY AT BEDTIME AS NEEDED FOR SLEEP 90 tablet 3     No current facility-administered medications for this visit.     Allergies   Allergen Reactions   • Augmentin [Amoxicillin-Pot Clavulanate] Nausea Only     Category: nausea,diarrhea;    • Azithromycin Nausea Only   • Doxycycline GI Intolerance   • Erythromycin GI Intolerance   • Lodine [Etodolac]      Category: rash;    • Lorazepam      Annotation - [id number]: mental status changes   • Sulfa Antibiotics      Category: rash;       Immunizations:     Immunization History   Administered Date(s) Administered   • COVID-19 MODERNA VACC 0.5 ML IM 02/09/2021, 03/09/2021, 10/23/2021, 04/02/2022   • INFLUENZA 10/14/2009, 09/23/2015, 01/05/2017, 12/20/2017, 10/15/2022   • Influenza Quadrivalent Preservative Free 3 years and older IM 12/23/2014, 01/05/2017   • Influenza, high dose seasonal 0.7 mL 11/18/2020, 09/17/2021, 01/12/2024   • Influenza, recombinant, quadrivalent,injectable, preservative free 09/14/2018, 11/05/2019   • Influenza, seasonal, injectable 09/30/2011, 10/09/2012   • Pneumococcal Conjugate Vaccine 20-valent (Pcv20), Polysace 01/09/2023   • Pneumococcal Polysaccharide PPV23 12/15/2010, 11/18/2020   • Td (adult), adsorbed 05/10/2002      Health Maintenance:         Topic Date Due   • Breast Cancer Screening: Mammogram  06/14/2024   • DXA SCAN  10/16/2025   • Colorectal Cancer Screening  05/01/2033   • Hepatitis C Screening   Completed     There are no preventive care reminders to display for this patient.     Medicare Screening Tests and Risk Assessments:     Mariah is here for her Subsequent Wellness visit.     Health Risk Assessment:   Patient rates overall health as fair. Patient feels that their physical health rating is slightly worse. Patient is satisfied with their life. Eyesight was rated as same. Hearing was rated as same. Patient feels that their emotional and mental health rating is slightly better. Patients states they are sometimes angry. Patient states they are often unusually tired/fatigued. Pain experienced in the last 7 days has been some. Patient's pain rating has been 8/10. Patient states that she has experienced weight loss or gain in last 6 months.     Depression Screening:   PHQ-9 Score: 2      Fall Risk Screening:   In the past year, patient has experienced: no history of falling in past year      Urinary Incontinence Screening:   Patient has leaked urine accidently in the last six months.     Home Safety:  Patient has trouble with stairs inside or outside of their home. Patient has working smoke alarms and has working carbon monoxide detector. Home safety hazards include: none.     Nutrition:   Current diet is Unhealthy.     Medications:   Patient is currently taking over-the-counter supplements. OTC medications include: see medication list. Patient is able to manage medications.     Activities of Daily Living (ADLs)/Instrumental Activities of Daily Living (IADLs):   Walk and transfer into and out of bed and chair?: Yes  Dress and groom yourself?: Yes    Bathe or shower yourself?: Yes    Feed yourself? Yes  Do your laundry/housekeeping?: Yes  Manage your money, pay your bills and track your expenses?: Yes  Make your own meals?: Yes    Do your own shopping?: No    Previous Hospitalizations:   Any hospitalizations or ED visits within the last 12 months?: No      Advance Care Planning:   Living will: Yes    Durable  POA for healthcare: No    Advanced directive: Yes    ACP document given: Yes    End of Life Decisions reviewed with patient: Yes      Cognitive Screening:   Provider or family/friend/caregiver concerned regarding cognition?: No    PREVENTIVE SCREENINGS      Cardiovascular Screening:    General: Screening Not Indicated and History Lipid Disorder      Diabetes Screening:     General: Screening Not Indicated and History Diabetes      Colorectal Cancer Screening:     General: Screening Current      Breast Cancer Screening:     General: Screening Current      Cervical Cancer Screening:    General: Screening Not Indicated      Osteoporosis Screening:    General: Screening Current      Abdominal Aortic Aneurysm (AAA) Screening:        General: Screening Not Indicated      Lung Cancer Screening:     General: Screening Not Indicated      Hepatitis C Screening:    General: Screening Current    Screening, Brief Intervention, and Referral to Treatment (SBIRT)    Screening  Typical number of drinks in a day: 0  Typical number of drinks in a week: 1  Interpretation: Low risk drinking behavior.    AUDIT-C Screenin) How often did you have a drink containing alcohol in the past year? 2 to 4 times a month  2) How many drinks did you have on a typical day when you were drinking in the past year? 0  3) How often did you have 6 or more drinks on one occasion in the past year? never    AUDIT-C Score: 2  Interpretation: Score 0-2 (female): Negative screen for alcohol misuse    Single Item Drug Screening:  How often have you used an illegal drug (including marijuana) or a prescription medication for non-medical reasons in the past year? never    Single Item Drug Screen Score: 0  Interpretation: Negative screen for possible drug use disorder    Other Counseling Topics:   Calcium and vitamin D intake and regular weightbearing exercise.     No results found.     Physical Exam:     /82 (BP Location: Left arm, Patient Position:  Sitting, Cuff Size: Large)   Pulse 103   Wt 126 kg (277 lb 12.8 oz)   SpO2 97%   BMI 44.84 kg/m²     Physical Exam  Constitutional:       General: She is not in acute distress.     Appearance: She is well-developed. She is obese. She is not diaphoretic.   HENT:      Head: Normocephalic.      Right Ear: External ear normal.      Left Ear: External ear normal.      Nose: Nose normal.   Eyes:      General: No scleral icterus.        Right eye: No discharge.         Left eye: No discharge.      Conjunctiva/sclera: Conjunctivae normal.      Pupils: Pupils are equal, round, and reactive to light.   Neck:      Thyroid: No thyromegaly.      Trachea: No tracheal deviation.   Cardiovascular:      Rate and Rhythm: Normal rate and regular rhythm.      Pulses: no weak pulses          Dorsalis pedis pulses are 2+ on the right side and 2+ on the left side.        Posterior tibial pulses are 2+ on the right side and 2+ on the left side.      Heart sounds: Normal heart sounds. No murmur heard.  Pulmonary:      Effort: Pulmonary effort is normal. No respiratory distress.      Breath sounds: Normal breath sounds. No stridor. No wheezing, rhonchi or rales.   Abdominal:      General: Bowel sounds are normal. There is no distension.      Palpations: Abdomen is soft.      Tenderness: There is no abdominal tenderness. There is no guarding.   Musculoskeletal:         General: No tenderness or deformity. Normal range of motion.      Right lower leg: No edema.      Left lower leg: No edema.   Feet:      Right foot:      Skin integrity: Warmth present. No ulcer, skin breakdown, erythema, callus or dry skin.      Left foot:      Skin integrity: Warmth present. No ulcer, skin breakdown, erythema, callus or dry skin.   Lymphadenopathy:      Cervical: No cervical adenopathy.   Skin:     General: Skin is warm.      Coloration: Skin is not pale.      Findings: No erythema or rash.   Neurological:      Cranial Nerves: No cranial nerve deficit.       Coordination: Coordination normal.   Psychiatric:         Mood and Affect: Mood normal.         Behavior: Behavior normal.         Thought Content: Thought content normal.          Eric William Jr, MD

## 2024-01-12 NOTE — ASSESSMENT & PLAN NOTE
She is having some bilateral hand pain and seems to be worsening the past 2 to 3 months or so.  I am going to have her get hand x-rays.  She has some arthritic changes of the hands.   is intact.  She can still take Aleve as needed as that seems to be effective.

## 2024-01-12 NOTE — ASSESSMENT & PLAN NOTE
Lab Results   Component Value Date    HGBA1C 7.3 (H) 01/08/2024   A1c has bumped up to 7.3 despite some recent weight loss.  I am going to place the patient on Mounjaro.  I have consulted clinical pharmacy to help with any cost issues as well as titration of this medication.  She has had significant nausea in the past with Trulicity so we will need to be watchful of this.

## 2024-01-22 ENCOUNTER — HOSPITAL ENCOUNTER (OUTPATIENT)
Dept: RADIOLOGY | Facility: IMAGING CENTER | Age: 69
Discharge: HOME | End: 2024-01-22
Payer: COMMERCIAL

## 2024-01-22 DIAGNOSIS — M79.641 PAIN IN BOTH HANDS: ICD-10-CM

## 2024-01-22 DIAGNOSIS — M79.642 PAIN IN BOTH HANDS: ICD-10-CM

## 2024-01-22 PROCEDURE — 73130 X-RAY EXAM OF HAND: CPT

## 2024-01-24 ENCOUNTER — CLINICAL SUPPORT (OUTPATIENT)
Dept: FAMILY MEDICINE CLINIC | Facility: CLINIC | Age: 69
End: 2024-01-24

## 2024-01-24 DIAGNOSIS — E11.65 TYPE 2 DIABETES MELLITUS WITH HYPERGLYCEMIA, WITHOUT LONG-TERM CURRENT USE OF INSULIN (HCC): ICD-10-CM

## 2024-01-24 NOTE — PROGRESS NOTES
[facility] Clinical Pharmacy Services  Cathleen Boggs    This virtual check-in was done via [employer].  Encounter provider: Cathleen Boggs    Patient agrees to participate in a virtual check in via telephone or video visit instead of presenting to the office to address urgent/immediate medical needs.       After connecting through [employer], the patient was identified by name and date of birth.  Mariah Page was informed that this was a telemedicine visit and that the exam was being conducted confidentially over secure lines.  My office door was closed. No one else was in the room.  Mariah Page acknowledged consent and understanding of privacy and security of the telemedicine visit.  I informed the patient that I have reviewed She record in Epic and presented the opportunity for She to ask any questions regarding the visit today. The patient agreed to participate.        ASSESSMENT/PLAN                                                                                     1. Type 2 diabetes mellitus with hyperglycemia, without long-term current use of insulin (HCC)  Assessment & Plan:    Lab Results   Component Value Date    HGBA1C 7.3 (H) 01/08/2024   Most recent A1c above goal despite weight loss  Patient may be tolerating Mounjaro as she has been eating healthier vs when she was on Trulicity.   May require additional protein given weight loss     Medications:  Mounjaro: patient tolerating without issue, will have patient continue to get at 30-day intervals in case she develops adverse drug reaction.   Metformin: continue for now  Home Monitoring:   BLOOD SUGAR TESTING  Please test your blood sugar:  2 Times per day - 2 days per week.  When to test your blood sugar:   Before Breakfast, Before Supper, and Bedtime  Target Blood sugar range: Before Meals: , 2 hours after meals: < 180  Call if your blood sugar is less than 60   Lifestyle Modifications: reviewed increasing protein  intake, patient will look into Premier Protein drinks    Orders:  -     tirzepatide (Mounjaro) 5 MG/0.5ML; Inject 0.5 mL (5 mg total) under the skin every 7 days        Follow-Up: 6 weeks via [facility]- prior to completing Mounjaro 5mg supply      SUBJECTIVE                                                                                                                Medication Adherence: Medication list reviewed with patient, reports the following discrepancies/problems:  amoxicillin  fexofenadine  fluticasone  gabapentin  lisinopril  metFORMIN  nystatin  OneTouch Delica Plus [id number] Misc  OneTouch Verio Strp  rosuvastatin  Tirzepatide: takes on Friday, took first dose on 1/12, noticed increased urination following first dose; noticed increased heartburn following second dose. No issues with nausea since starting      2. Medication Efficacy:    Review of Systems   Constitutional:  Positive for appetite change (decreased).   Gastrointestinal:  Negative for constipation, diarrhea, nausea and vomiting.       Has glucometer at home but not checking    3. Lifestyle: has lost over 30# in the past year, gave up desserts and reducing portion size was not trying to lose weight but appetite decreased on its own. Used to have second helpings of food but no longer able to. Has lost another 3# since PCP appointment      Usually only eats 2 meals/day; may have cheese as a snack between lunch/supper    When she was on Trulicity was eating higher fat foods but now eating healthier.     No protein during the day    Exercise: no formal exercise regimen due to pain     OBJECTIVE                                                                                                      Eye Exam:    Lab Results   Component Value Date    LEFTDIABRET None 09/18/2023    RIGHTDIABRET None 09/18/2023       Lab Results   Component Value Date    SODIUM 141 01/08/2024    K 4.2 01/08/2024    EGFR 90 01/08/2024    CREATININE 0.68 01/08/2024    GLUF 162  (H) 01/08/2024    MICROALBCRE 12 01/08/2024       Lab Results   Component Value Date    HGBA1C 7.3 (H) 01/08/2024    HGBA1C 6.4 (H) 07/03/2023    HGBA1C 6.9 (H) 01/04/2023       Pharmacist Tracking Tool  Reason For Outreach: Embedded Pharmacist  Demographics:  Intervention Method: Video  Type of Intervention: New  Topics Addressed: Diabetes  Pharmacologic Interventions: N/A  Non-Pharmacologic Interventions: Disease state education and Medication/Device education  Time:  Direct Patient Care:  20  mins   Care Coordination:  10  mins  Recommendation Recipient: Patient/Caregiver  Outcome: Accepted

## 2024-01-24 NOTE — ASSESSMENT & PLAN NOTE
Lab Results   Component Value Date    HGBA1C 7.3 (H) 01/08/2024   Most recent A1c above goal despite weight loss  Patient may be tolerating Mounjaro as she has been eating healthier vs when she was on Trulicity.   May require additional protein given weight loss     Medications:  Mounjaro: patient tolerating without issue, will have patient continue to get at 30-day intervals in case she develops adverse drug reaction.   Metformin: continue for now  Home Monitoring:   BLOOD SUGAR TESTING  Please test your blood sugar:  2 Times per day - 2 days per week.  When to test your blood sugar:   Before Breakfast, Before Supper, and Bedtime  Target Blood sugar range: Before Meals: , 2 hours after meals: < 180  Call if your blood sugar is less than 60   Lifestyle Modifications: reviewed increasing protein intake, patient will look into Premier Protein drinks

## 2024-02-08 ENCOUNTER — TELEPHONE (OUTPATIENT)
Dept: FAMILY MEDICINE CLINIC | Facility: CLINIC | Age: 69
End: 2024-02-08

## 2024-02-08 NOTE — TELEPHONE ENCOUNTER
Fax from [facility] looking for chart notes for self-monitored blood glucose supplies. Noted patient previously received through [facility] but uncertain if still getting from there.

## 2024-02-09 LAB
DME PARACHUTE DELIVERY DATE REQUESTED: NORMAL
DME PARACHUTE ITEM DESCRIPTION: NORMAL
DME PARACHUTE ITEM DESCRIPTION: NORMAL
DME PARACHUTE ORDER STATUS: NORMAL
DME PARACHUTE SUPPLIER NAME: NORMAL
DME PARACHUTE SUPPLIER PHONE: NORMAL

## 2024-02-09 NOTE — TELEPHONE ENCOUNTER
Orders sent to [facility] via [place].     Pharmacist Tracking Tool  Reason For Outreach: Embedded Pharmacist  Demographics:  Intervention Method: Refinder by Gnowsishart Message  Type of Intervention: Follow-Up  Topics Addressed: Diabetes  Pharmacologic Interventions: N/A  Non-Pharmacologic Interventions: Home Monitoring  Time:  Direct Patient Care:  5  mins  Care Coordination:  5  mins  Recommendation Recipient: Patient/Caregiver  Outcome: Accepted

## 2024-02-13 DIAGNOSIS — G47.01 INSOMNIA DUE TO MEDICAL CONDITION: ICD-10-CM

## 2024-02-13 RX ORDER — TRAZODONE HYDROCHLORIDE 50 MG/1
TABLET ORAL
Qty: 90 TABLET | Refills: 1 | Status: SHIPPED | OUTPATIENT
Start: 2024-02-13

## 2024-02-14 DIAGNOSIS — M79.7 FIBROMYALGIA: ICD-10-CM

## 2024-02-14 RX ORDER — GABAPENTIN 300 MG/1
CAPSULE ORAL
Qty: 360 CAPSULE | Refills: 1 | Status: SHIPPED | OUTPATIENT
Start: 2024-02-14

## 2024-02-15 LAB
DME PARACHUTE DELIVERY DATE ACTUAL: NORMAL
DME PARACHUTE DELIVERY DATE REQUESTED: NORMAL
DME PARACHUTE ITEM DESCRIPTION: NORMAL
DME PARACHUTE ITEM DESCRIPTION: NORMAL
DME PARACHUTE ORDER STATUS: NORMAL
DME PARACHUTE SUPPLIER NAME: NORMAL
DME PARACHUTE SUPPLIER PHONE: NORMAL

## 2024-02-28 ENCOUNTER — CLINICAL SUPPORT (OUTPATIENT)
Dept: FAMILY MEDICINE CLINIC | Facility: CLINIC | Age: 69
End: 2024-02-28

## 2024-02-28 VITALS — WEIGHT: 262 LBS | BODY MASS INDEX: 42.29 KG/M2

## 2024-02-28 DIAGNOSIS — I10 ESSENTIAL HYPERTENSION: ICD-10-CM

## 2024-02-28 DIAGNOSIS — E11.65 TYPE 2 DIABETES MELLITUS WITH HYPERGLYCEMIA, WITHOUT LONG-TERM CURRENT USE OF INSULIN (HCC): Primary | ICD-10-CM

## 2024-02-28 DIAGNOSIS — E66.01 CLASS 3 SEVERE OBESITY DUE TO EXCESS CALORIES WITHOUT SERIOUS COMORBIDITY WITH BODY MASS INDEX (BMI) OF 40.0 TO 44.9 IN ADULT (HCC): ICD-10-CM

## 2024-02-28 RX ORDER — TIRZEPATIDE 5 MG/.5ML
5 INJECTION, SOLUTION SUBCUTANEOUS WEEKLY
Qty: 6 ML | Refills: 1 | Status: SHIPPED | OUTPATIENT
Start: 2024-02-28

## 2024-02-28 NOTE — ASSESSMENT & PLAN NOTE
"Tolerating Mounjaro well.    Baseline Weight: 272 pounds (date: 1/2024)  Current Weight: 262 pounds (date: 2/2024)  Current Weight Loss: - 10 pounds (-3.8%)  Initiation or Continuation of Therapy: continuation  Current BMI: Estimated body mass index is 42.29 kg/m² as calculated from the following:    Height as of 8/22/23: 5' 6\" (1.676 m).    Weight as of this encounter: 119 kg (262 lb).    Medications: continue Mounjaro  "

## 2024-02-28 NOTE — PROGRESS NOTES
[facility] Clinical Pharmacy Services  Cathleen Boggs    This virtual check-in was done via [employer].  Encounter provider: Cathleen Boggs    Patient agrees to participate in a virtual check in via telephone or video visit instead of presenting to the office to address urgent/immediate medical needs.       After connecting through [employer], the patient was identified by name and date of birth.  Mariah Page was informed that this was a telemedicine visit and that the exam was being conducted confidentially over secure lines.  My office door was closed. No one else was in the room.  Mariah Page acknowledged consent and understanding of privacy and security of the telemedicine visit.  I informed the patient that I have reviewed She record in Epic and presented the opportunity for She to ask any questions regarding the visit today. The patient agreed to participate.        ASSESSMENT/PLAN                                                                                     1. Type 2 diabetes mellitus with hyperglycemia, without long-term current use of insulin (HCC)  Assessment & Plan:    Lab Results   Component Value Date    HGBA1C 7.3 (H) 01/08/2024   Most recent A1c above goal but not reflective of current treatment as patient was not on Mounjaro.  Reported SMBG average to goal.   Uncertain if gum issue related to Mounjaro.      Medications:  Mounjaro: continue as prescribed. Patient will review likelihood of gum issue spontaneous reoccurrence with dentist tomorrow during her appointment as we are uncertain if this is related to Mounjaro or not. If the gum issues develops again, we will look to hold Mounjaro to see if that stops issue from developing.   Metformin: continue for now; consider trial off based on next A1c  Home Monitoring:   BLOOD SUGAR TESTING  Please test your blood sugar:  1 Times per day.  When to test your blood sugar:   Before Breakfast  Target Blood sugar range:  "Before Meals: , 2 hours after meals: < 180  Call if your blood sugar is less than 60   Lifestyle Modifications: encouraged patient to continue with lifestyle modifications\    Orders:  -     Mounjaro 5 MG/0.5ML; Inject 0.5 mL (5 mg total) under the skin once a week    2. Class 3 severe obesity due to excess calories without serious comorbidity with body mass index (BMI) of 40.0 to 44.9 in adult ([facility])  Assessment & Plan:  Tolerating Mounjaro well.    Baseline Weight: 272 pounds (date: 1/2024)  Current Weight: 262 pounds (date: 2/2024)  Current Weight Loss: - 10 pounds (-3.8%)  Initiation or Continuation of Therapy: continuation  Current BMI: Estimated body mass index is 42.29 kg/m² as calculated from the following:    Height as of 8/22/23: 5' 6\" (1.676 m).    Weight as of this encounter: 119 kg (262 lb).    Medications: continue Mounjaro      3. Essential hypertension  Assessment & Plan:  Home blood pressure readings trending lower due to weight loss. Patient will continue to check home blood pressure readings. If blood pressure readings continue to trend lower or patient develops hypotension signs/symptoms, will look at reducing lisinopril dose.           Follow-Up: PCP as scheduled; patient agrees to contact PharmD if issues in the interim      SUBJECTIVE                                                                                                                Medication Adherence: Medication list reviewed with patient, reports the following discrepancies/problems:  amoxicillin  fexofenadine  fluticasone  gabapentin  lisinopril  metFORMIN  nystatin  OneTouch Delica Plus [id number] Misc  OneTouch Verio Strp  rosuvastatin  Tirzepatide: takes on Fridays    Calcium carbonate 600mg + vitamin D3 20mcg  - 1 tablet twice daily     Voltaren gel on hands      2. Medication Efficacy:    Review of Systems   Constitutional:  Positive for appetite change (decreased).   HENT:  Positive for dental problem.         Saw " dentist for gum issue; dentist was uncertain if related to Mounjaro but is currently 85% resolved.    Gastrointestinal:  Negative for constipation, diarrhea, nausea and vomiting.        Yesterday she started feeling bloated but did not feel this previously on Mounjaro. Started feeling better today.   Neurological:  Negative for dizziness, light-headedness and headaches.     Fasting blood sugar average 111; post meals average is 125     Home blood pressure readings - 105/75, 117/74    3. Lifestyle: today's weight was 262 pounds  has lost over 30# in the past year, gave up desserts and reducing portion size was not trying to lose weight but appetite decreased on its own. Used to have second helpings of food but no longer able to. Has lost another 3# since PCP appointment      Usually only eats 2 meals/day; may have cheese as a snack between lunch/supper    Started to drink Premier shakes during the week and yogurt    Exercise: no formal exercise regimen due to pain     OBJECTIVE                                                                                                      Eye Exam:    Lab Results   Component Value Date    LEFTDIABRET None 09/18/2023    RIGHTDIABRET None 09/18/2023       Lab Results   Component Value Date    SODIUM 141 01/08/2024    K 4.2 01/08/2024    EGFR 90 01/08/2024    CREATININE 0.68 01/08/2024    GLUF 162 (H) 01/08/2024    MICROALBCRE 12 01/08/2024       Lab Results   Component Value Date    HGBA1C 7.3 (H) 01/08/2024    HGBA1C 6.4 (H) 07/03/2023    HGBA1C 6.9 (H) 01/04/2023       Pharmacist Tracking Tool  Reason For Outreach: Embedded Pharmacist  Demographics:  Intervention Method: Video  Type of Intervention: Follow-Up  Topics Addressed: Diabetes, Hypertension, and Obesity  Pharmacologic Interventions: Prevent or Manage FARHAT  Non-Pharmacologic Interventions: Disease state education and Home Monitoring  Time:  Direct Patient Care:  20  mins  Care Coordination:  10  mins  Recommendation  Recipient: Patient/Caregiver  Outcome: Accepted

## 2024-02-28 NOTE — ASSESSMENT & PLAN NOTE
Home blood pressure readings trending lower due to weight loss. Patient will continue to check home blood pressure readings. If blood pressure readings continue to trend lower or patient develops hypotension signs/symptoms, will look at reducing lisinopril dose.

## 2024-02-28 NOTE — ASSESSMENT & PLAN NOTE
Lab Results   Component Value Date    HGBA1C 7.3 (H) 01/08/2024   Most recent A1c above goal but not reflective of current treatment as patient was not on Mounjaro.  Reported SMBG average to goal.   Uncertain if gum issue related to Mounjaro.      Medications:  Mounjaro: continue as prescribed. Patient will review likelihood of gum issue spontaneous reoccurrence with dentist tomorrow during her appointment as we are uncertain if this is related to Mounjaro or not. If the gum issues develops again, we will look to hold Mounjaro to see if that stops issue from developing.   Metformin: continue for now; consider trial off based on next A1c  Home Monitoring:   BLOOD SUGAR TESTING  Please test your blood sugar:  1 Times per day.  When to test your blood sugar:   Before Breakfast  Target Blood sugar range: Before Meals: , 2 hours after meals: < 180  Call if your blood sugar is less than 60   Lifestyle Modifications: encouraged patient to continue with lifestyle modifications\

## 2024-02-29 ENCOUNTER — TELEPHONE (OUTPATIENT)
Age: 69
End: 2024-02-29

## 2024-02-29 NOTE — TELEPHONE ENCOUNTER
PA for Mounjaro 5 MG/0.5ML     Submitted via    [x]CMM-KEY [id number]  []S[employer]cri[employer]be-Case ID #   []Faxed to plan   []Other website   []Phone call Case ID #     Office notes sent, clinical questions answered. Awaiting determination    Turnaround time for your insurance to make a decision on your Prior Authorization can take 7-21 business days.

## 2024-03-01 NOTE — TELEPHONE ENCOUNTER
PA for  Mounjaro 5 MG/0.5ML   not required     Reason- (screenshot if applicable)              Message sent to provider pool No

## 2024-03-01 NOTE — TELEPHONE ENCOUNTER
PA for Mounjaro 5 MG/0.5ML      RE-Submitted via    []CMM-KEY   [][employer]-Case ID #   [x]Faxed to plan   []Other website   []Phone call Case ID #     Office notes sent, clinical questions answered. Awaiting determination    Turnaround time for your insurance to make a decision on your Prior Authorization can take 7-21 business days.

## 2024-04-23 ENCOUNTER — APPOINTMENT (OUTPATIENT)
Dept: LAB | Facility: IMAGING CENTER | Age: 69
End: 2024-04-23
Payer: COMMERCIAL

## 2024-04-23 DIAGNOSIS — E11.65 TYPE 2 DIABETES MELLITUS WITH HYPERGLYCEMIA, WITHOUT LONG-TERM CURRENT USE OF INSULIN (HCC): ICD-10-CM

## 2024-04-23 LAB
CHOLEST SERPL-MCNC: 131 MG/DL
EST. AVERAGE GLUCOSE BLD GHB EST-MCNC: 123 MG/DL
HBA1C MFR BLD: 5.9 %
HDLC SERPL-MCNC: 58 MG/DL
LDLC SERPL CALC-MCNC: 45 MG/DL (ref 0–100)
TRIGL SERPL-MCNC: 142 MG/DL

## 2024-04-23 PROCEDURE — 80053 COMPREHEN METABOLIC PANEL: CPT

## 2024-04-23 PROCEDURE — 36415 COLL VENOUS BLD VENIPUNCTURE: CPT

## 2024-04-23 PROCEDURE — 80061 LIPID PANEL: CPT

## 2024-04-23 PROCEDURE — 83036 HEMOGLOBIN GLYCOSYLATED A1C: CPT

## 2024-04-24 ENCOUNTER — PATIENT MESSAGE (OUTPATIENT)
Dept: FAMILY MEDICINE CLINIC | Facility: CLINIC | Age: 69
End: 2024-04-24

## 2024-04-24 DIAGNOSIS — E11.65 TYPE 2 DIABETES MELLITUS WITH HYPERGLYCEMIA, WITHOUT LONG-TERM CURRENT USE OF INSULIN (HCC): ICD-10-CM

## 2024-04-24 LAB
ALBUMIN SERPL BCP-MCNC: 4.2 G/DL (ref 3.5–5)
ALP SERPL-CCNC: 36 U/L (ref 34–104)
ALT SERPL W P-5'-P-CCNC: 14 U/L (ref 7–52)
ANION GAP SERPL CALCULATED.3IONS-SCNC: 12 MMOL/L (ref 4–13)
AST SERPL W P-5'-P-CCNC: 20 U/L (ref 13–39)
BILIRUB SERPL-MCNC: 0.56 MG/DL (ref 0.2–1)
BUN SERPL-MCNC: 12 MG/DL (ref 5–25)
CALCIUM SERPL-MCNC: 9.7 MG/DL (ref 8.4–10.2)
CHLORIDE SERPL-SCNC: 102 MMOL/L (ref 96–108)
CO2 SERPL-SCNC: 27 MMOL/L (ref 21–32)
CREAT SERPL-MCNC: 0.76 MG/DL (ref 0.6–1.3)
GFR SERPL CREATININE-BSD FRML MDRD: 80 ML/MIN/1.73SQ M
GLUCOSE P FAST SERPL-MCNC: 108 MG/DL (ref 65–99)
POTASSIUM SERPL-SCNC: 4.5 MMOL/L (ref 3.5–5.3)
PROT SERPL-MCNC: 7 G/DL (ref 6.4–8.4)
SODIUM SERPL-SCNC: 141 MMOL/L (ref 135–147)

## 2024-04-24 RX ORDER — METFORMIN HYDROCHLORIDE 500 MG/1
1000 TABLET, EXTENDED RELEASE ORAL DAILY
Qty: 200 TABLET | Refills: 3 | Status: SHIPPED | OUTPATIENT
Start: 2024-04-24

## 2024-04-24 RX ORDER — METFORMIN HYDROCHLORIDE 500 MG/1
1000 TABLET, EXTENDED RELEASE ORAL DAILY
Qty: 200 TABLET | Refills: 3 | Status: SHIPPED | OUTPATIENT
Start: 2024-04-24 | End: 2024-04-24 | Stop reason: SDUPTHER

## 2024-04-24 NOTE — PATIENT COMMUNICATION
Lab Results   Component Value Date    HGBA1C 5.9 (H) 04/23/2024       Pharmacist Tracking Tool  Reason For Outreach: Embedded Pharmacist  Demographics:  Intervention Method: Maporit Message  Type of Intervention: Follow-Up  Topics Addressed: Diabetes  Pharmacologic Interventions: Dose or Frequency Adjusted  Non-Pharmacologic Interventions: N/A  Time:  Direct Patient Care:  5  mins  Care Coordination:  10  mins  Recommendation Recipient: Patient/Caregiver  Outcome: Accepted

## 2024-04-24 NOTE — ADDENDUM NOTE
Addended by: BITA OVALLE on: 4/24/2024 10:40 AM     Modules accepted: Orders    
Addended by: BITA OVALLE on: 4/24/2024 12:05 PM     Modules accepted: Orders    
No

## 2024-05-13 ENCOUNTER — PATIENT MESSAGE (OUTPATIENT)
Dept: FAMILY MEDICINE CLINIC | Facility: CLINIC | Age: 69
End: 2024-05-13

## 2024-05-13 ENCOUNTER — OFFICE VISIT (OUTPATIENT)
Dept: FAMILY MEDICINE CLINIC | Facility: CLINIC | Age: 69
End: 2024-05-13
Payer: COMMERCIAL

## 2024-05-13 VITALS
DIASTOLIC BLOOD PRESSURE: 80 MMHG | HEART RATE: 92 BPM | WEIGHT: 256.8 LBS | OXYGEN SATURATION: 98 % | SYSTOLIC BLOOD PRESSURE: 122 MMHG | BODY MASS INDEX: 41.45 KG/M2

## 2024-05-13 DIAGNOSIS — E55.9 VITAMIN D DEFICIENCY: ICD-10-CM

## 2024-05-13 DIAGNOSIS — M54.50 CHRONIC BILATERAL LOW BACK PAIN WITHOUT SCIATICA: ICD-10-CM

## 2024-05-13 DIAGNOSIS — E11.65 TYPE 2 DIABETES MELLITUS WITH HYPERGLYCEMIA, WITHOUT LONG-TERM CURRENT USE OF INSULIN (HCC): Primary | ICD-10-CM

## 2024-05-13 DIAGNOSIS — E66.01 CLASS 3 SEVERE OBESITY DUE TO EXCESS CALORIES WITHOUT SERIOUS COMORBIDITY WITH BODY MASS INDEX (BMI) OF 40.0 TO 44.9 IN ADULT (HCC): ICD-10-CM

## 2024-05-13 DIAGNOSIS — E78.00 PURE HYPERCHOLESTEROLEMIA: ICD-10-CM

## 2024-05-13 DIAGNOSIS — H61.22 HEARING LOSS DUE TO CERUMEN IMPACTION, LEFT: ICD-10-CM

## 2024-05-13 DIAGNOSIS — F33.42 MDD (MAJOR DEPRESSIVE DISORDER), RECURRENT, IN FULL REMISSION (HCC): ICD-10-CM

## 2024-05-13 DIAGNOSIS — G89.29 CHRONIC BILATERAL LOW BACK PAIN WITHOUT SCIATICA: ICD-10-CM

## 2024-05-13 DIAGNOSIS — I10 ESSENTIAL HYPERTENSION: ICD-10-CM

## 2024-05-13 DIAGNOSIS — K21.9 GASTROESOPHAGEAL REFLUX DISEASE WITHOUT ESOPHAGITIS: ICD-10-CM

## 2024-05-13 PROBLEM — M79.10 MYALGIA: Status: RESOLVED | Noted: 2022-07-18 | Resolved: 2024-05-13

## 2024-05-13 PROCEDURE — 69210 REMOVE IMPACTED EAR WAX UNI: CPT | Performed by: FAMILY MEDICINE

## 2024-05-13 PROCEDURE — 99214 OFFICE O/P EST MOD 30 MIN: CPT | Performed by: FAMILY MEDICINE

## 2024-05-13 RX ORDER — LISINOPRIL 20 MG/1
20 TABLET ORAL DAILY
Qty: 90 TABLET | Refills: 3 | Status: SHIPPED | OUTPATIENT
Start: 2024-05-13

## 2024-05-13 NOTE — ASSESSMENT & PLAN NOTE
Blood pressure is excellent at home and she even has some low readings.  She is lost a significant mount of weight and I am going to decrease her lisinopril to 20 mg daily.  Follow-up within 6 months, sooner as needed.

## 2024-05-13 NOTE — PATIENT COMMUNICATION
Lab Results   Component Value Date    HGBA1C 5.9 (H) 04/23/2024     Pharmacist Tracking Tool  Reason For Outreach: Embedded Pharmacist  Demographics:  Intervention Method: Agrivit Message  Type of Intervention: Follow-Up  Topics Addressed: Diabetes  Pharmacologic Interventions: Dose or Frequency Adjusted  Non-Pharmacologic Interventions: N/A  Time:  Direct Patient Care:  5  mins  Care Coordination:  5  mins  Recommendation Recipient: Patient/Caregiver  Outcome: Accepted

## 2024-05-13 NOTE — ASSESSMENT & PLAN NOTE
I was able to remove a large amount of wax from her left ear canal today.  She has a bit of cerumen left up against her eardrum and I would like her to try Debrox drops 2 to 3 drops nightly at least once a week.

## 2024-05-13 NOTE — PROGRESS NOTES
Ear cerumen removal    Date/Time: 5/13/2024 9:45 AM    Performed by: Eric Conley Jr., MD  Authorized by: Eric Conley Jr., MD  Universal Protocol:  Consent: Verbal consent obtained.  Consent given by: patient  Patient identity confirmed: verbally with patient    Patient location:  Clinic  Procedure details:     Local anesthetic:  None    Location:  L ear    Procedure type: curette      Approach:  External  Post-procedure details:     Complication:  None    Hearing quality:  Improved    Patient tolerance of procedure:  Tolerated well, no immediate complications    Assessment/Plan:       Problem List Items Addressed This Visit        Cardiovascular and Mediastinum    Essential hypertension     Blood pressure is excellent at home and she even has some low readings.  She is lost a significant mount of weight and I am going to decrease her lisinopril to 20 mg daily.  Follow-up within 6 months, sooner as needed.         Relevant Medications    lisinopril (ZESTRIL) 20 mg tablet    Other Relevant Orders    CBC and differential    Comprehensive metabolic panel    Lipid Panel with Direct LDL reflex       Digestive    Gastroesophageal reflux disease without esophagitis    Relevant Orders    CBC and differential    Comprehensive metabolic panel       Endocrine    Type 2 diabetes mellitus with hyperglycemia, without long-term current use of insulin ([facility]) - Primary    Relevant Medications    lisinopril (ZESTRIL) 20 mg tablet    Other Relevant Orders    CBC and differential    Hemoglobin A1C    Comprehensive metabolic panel    Lipid Panel with Direct LDL reflex    TSH, 3rd generation with Free T4 reflex       Nervous and Auditory    Hearing loss due to cerumen impaction, left     I was able to remove a large amount of wax from her left ear canal today.  She has a bit of cerumen left up against her eardrum and I would like her to try Debrox drops 2 to 3 drops nightly at least once a week.         Relevant Orders    Ear  cerumen removal (Completed)       Behavioral Health    MDD (major depressive disorder), recurrent, in full remission (HCC)     Stable at this time.  She will continue trazodone nightly.         Relevant Orders    CBC and differential    Comprehensive metabolic panel       Surgery/Wound/Pain    Chronic bilateral low back pain without sciatica     Continue gabapentin.            Other    Pure hypercholesterolemia    Relevant Orders    Comprehensive metabolic panel    Lipid Panel with Direct LDL reflex    Class 3 severe obesity due to excess calories without serious comorbidity with body mass index (BMI) of 40.0 to 44.9 in adult (HCC)     Continue Mounjaro.         Vitamin D deficiency    Relevant Orders    Vitamin D 25 hydroxy         Subjective:      Patient ID: Mariah Page is a 69 y.o. female.    Today for follow-up of chronic health issues.  She has continued to lose weight with Mounjaro and is feeling quite well.  She has improved numbers.  She denies any present chest pain, shortness of breath, palpitation or lightheadedness.  Back pain is improved but still is present.  She remains on gabapentin for this.  She has a history of some depressed mood which she feels is quite stable at this time and she denies excessive anxiety.  She complains of some decreased hearing in her left ear and a sensation of fullness.        The following portions of the patient's history were reviewed and updated as appropriate: allergies, current medications, past family history, past medical history, past social history, past surgical history and problem list.      Current Outpatient Medications:   •  amoxicillin (AMOXIL) 500 mg capsule, TAKE 4 CAPS 1 HOUR PRIOR TO DENTAL TREATMENT, Disp: , Rfl:   •  Cholecalciferol (VITAMIN D) 2000 units tablet, Take 2,000 Units by mouth daily, Disp: , Rfl:   •  fexofenadine (ALLEGRA) 180 MG tablet, Take 180 mg by mouth daily, Disp: , Rfl:   •  fluticasone (FLONASE) 50 mcg/act nasal spray, 1 spray  into each nostril daily, Disp: , Rfl:   •  gabapentin (NEURONTIN) 300 mg capsule, Take 1 tablet in the morning and 2 at bedtime.  Take an additional dose in the afternoon as needed., Disp: 360 capsule, Rfl: 1  •  glucose blood (OneTouch Verio) test strip, Use 1 each daily Use as instructed to check blood sugar -  - or as preferred by insurance coverage, Disp: 100 each, Rfl: 1  •  Lancets (OneTouch Delica Plus [id number]) MISC, Use 1 each daily To check blood sugar  - or as preferred by insurance coverage, Disp: 100 each, Rfl: 1  •  lisinopril (ZESTRIL) 20 mg tablet, Take 1 tablet (20 mg total) by mouth daily, Disp: 90 tablet, Rfl: 3  •  metFORMIN (GLUCOPHAGE-XR) 500 mg 24 hr tablet, Take 2 tablets (1,000 mg total) by mouth daily, Disp: 200 tablet, Rfl: 3  •  rosuvastatin (CRESTOR) 5 mg tablet, Take 1 tablet (5 mg total) by mouth daily, Disp: 90 tablet, Rfl: 3  •  traZODone (DESYREL) 50 mg tablet, TAKE 1 TABLET DAILY AT BEDTIME AS NEEDED FOR SLEEP, Disp: 90 tablet, Rfl: 1  •  tirzepatide (Mounjaro) 5 MG/0.5ML, Inject 0.5 mL (5 mg total) under the skin once a week For diabetes, Disp: 6 mL, Rfl: 1     Review of Systems   Constitutional:  Negative for appetite change, chills, fatigue, fever and unexpected weight change.   HENT:  Negative for ear discharge, ear pain and trouble swallowing.    Eyes:  Negative for visual disturbance.   Respiratory:  Negative for cough, chest tightness, shortness of breath and wheezing.    Cardiovascular:  Negative for chest pain, palpitations and leg swelling.   Gastrointestinal:  Negative for abdominal distention, abdominal pain, blood in stool, constipation and diarrhea.   Endocrine: Negative for polyuria.   Genitourinary:  Negative for difficulty urinating and flank pain.   Musculoskeletal:  Positive for back pain and gait problem. Negative for arthralgias and myalgias.   Skin:  Negative for rash.   Neurological:  Negative for dizziness and light-headedness.   Hematological:  Negative  for adenopathy. Does not bruise/bleed easily.   Psychiatric/Behavioral:  Negative for dysphoric mood and sleep disturbance. The patient is not nervous/anxious.          Objective:      /80 (BP Location: Left arm, Patient Position: Sitting, Cuff Size: Large)   Pulse 92   Wt 116 kg (256 lb 12.8 oz)   SpO2 98%   BMI 41.45 kg/m²          Physical Exam  Vitals reviewed.   Constitutional:       General: She is not in acute distress.     Appearance: She is well-developed. She is obese. She is not diaphoretic.   HENT:      Head: Normocephalic.      Left Ear: There is impacted cerumen.   Eyes:      General:         Right eye: No discharge.         Left eye: No discharge.      Pupils: Pupils are equal, round, and reactive to light.   Neck:      Thyroid: No thyromegaly.      Trachea: No tracheal deviation.   Cardiovascular:      Rate and Rhythm: Normal rate and regular rhythm.      Heart sounds: Normal heart sounds. No murmur heard.  Pulmonary:      Effort: Pulmonary effort is normal. No respiratory distress.      Breath sounds: No wheezing or rales.   Abdominal:      General: There is no distension.      Palpations: Abdomen is soft.      Tenderness: There is no abdominal tenderness.   Musculoskeletal:         General: Normal range of motion.      Right lower leg: No edema.      Left lower leg: No edema.   Lymphadenopathy:      Cervical: No cervical adenopathy.   Skin:     General: Skin is warm.      Findings: No erythema.   Neurological:      General: No focal deficit present.      Mental Status: She is alert and oriented to person, place, and time.      Gait: Gait normal.   Psychiatric:         Thought Content: Thought content normal.         Judgment: Judgment normal.           Eric William Jr, MD

## 2024-05-21 DIAGNOSIS — E78.00 PURE HYPERCHOLESTEROLEMIA: ICD-10-CM

## 2024-05-22 RX ORDER — ROSUVASTATIN CALCIUM 5 MG/1
5 TABLET, COATED ORAL DAILY
Qty: 90 TABLET | Refills: 1 | Status: SHIPPED | OUTPATIENT
Start: 2024-05-22

## 2024-05-23 ENCOUNTER — CLINICAL SUPPORT (OUTPATIENT)
Dept: FAMILY MEDICINE CLINIC | Facility: CLINIC | Age: 69
End: 2024-05-23

## 2024-05-23 VITALS — BODY MASS INDEX: 41.48 KG/M2 | WEIGHT: 257 LBS

## 2024-05-23 DIAGNOSIS — E11.65 TYPE 2 DIABETES MELLITUS WITH HYPERGLYCEMIA, WITHOUT LONG-TERM CURRENT USE OF INSULIN (HCC): Primary | ICD-10-CM

## 2024-05-23 NOTE — ASSESSMENT & PLAN NOTE
Lab Results   Component Value Date    HGBA1C 5.9 (H) 04/23/2024   Reviewed Ozempic vs Mounjaro. Given patient's past experience with Trulicity, uncertain if patient has been better able to tolerate Mounjaro due to GIP component or eating better while on Mounjaro compared to lifestyle habits while on Trulicity. Patient voiced understanding and agrees to monitor.     Will have patient continue metformin for now; will reassess in future.

## 2024-05-23 NOTE — PROGRESS NOTES
[facility] Clinical Pharmacy Services  Cathleen Boggs    This virtual check-in was done via [employer].  Encounter provider: Cathleen Boggs    Patient agrees to participate in a virtual check in via telephone or video visit instead of presenting to the office to address urgent/immediate medical needs.       After connecting through [employer], the patient was identified by name and date of birth.  Mariah Page was informed that this was a telemedicine visit and that the exam was being conducted confidentially over secure lines.  My office door was closed. No one else was in the room.  Mariah Page acknowledged consent and understanding of privacy and security of the telemedicine visit.  I informed the patient that I have reviewed She record in Epic and presented the opportunity for She to ask any questions regarding the visit today. The patient agreed to participate.        ASSESSMENT/PLAN                                                                                     1. Type 2 diabetes mellitus with hyperglycemia, without long-term current use of insulin (HCC)  Assessment & Plan:    Lab Results   Component Value Date    HGBA1C 5.9 (H) 04/23/2024   Reviewed Ozempic vs Mounjaro. Given patient's past experience with Trulicity, uncertain if patient has been better able to tolerate Mounjaro due to GIP component or eating better while on Mounjaro compared to lifestyle habits while on Trulicity. Patient voiced understanding and agrees to monitor.     Will have patient continue metformin for now; will reassess in future.         Follow-Up: 3 weeks via iYogi Message       SUBJECTIVE                                                                                                              Medication Adherence: Medication list reviewed with patient, reports the following discrepancies/problems:  amoxicillin  fexofenadine  fluticasone  gabapentin  lisinopril  metFORMIN  OneTouch Delica Plus  [id number] Misc  OneTouch Verio Strp  rosuvastatin  semaglutide (2 mg/dose)  traZODone  Vitamin D:     Has mounjaro dose for 5/24    Watched video on Ozempic admin, has not yet picked up Ozempic from pharmacy.     2. Medication Efficacy:    Review of Systems   Gastrointestinal:  Negative for constipation, diarrhea, nausea and vomiting.       Home blood sugar readings (no log, per memory): highest blood sugar reading in the pats couple of weeks was 132 - yesterday was 101 in the morning, 103 before bed       OBJECTIVE                                                                                                          Vitals:    05/23/24 0900   Weight: 117 kg (257 lb)       Eye Exam:    Lab Results   Component Value Date    LEFTDIABRET None 09/18/2023    RIGHTDIABRET None 09/18/2023       Lab Results   Component Value Date    SODIUM 141 04/23/2024    K 4.5 04/23/2024    EGFR 80 04/23/2024    CREATININE 0.76 04/23/2024    GLUF 108 (H) 04/23/2024    MICROALBCRE 12 01/08/2024       Lab Results   Component Value Date    HGBA1C 5.9 (H) 04/23/2024    HGBA1C 7.3 (H) 01/08/2024    HGBA1C 6.4 (H) 07/03/2023       Pharmacist Tracking Tool  Reason For Outreach: Embedded Pharmacist  Demographics:  Intervention Method: Video  Type of Intervention: Follow-Up  Topics Addressed: Diabetes  Pharmacologic Interventions: Medication Conversion  Non-Pharmacologic Interventions: Medication/Device education  Time:  Direct Patient Care:  15  mins   Care Coordination:  10  mins  Recommendation Recipient: Patient/Caregiver  Outcome: Accepted

## 2024-05-28 ENCOUNTER — PATIENT MESSAGE (OUTPATIENT)
Dept: FAMILY MEDICINE CLINIC | Facility: CLINIC | Age: 69
End: 2024-05-28

## 2024-05-28 DIAGNOSIS — E11.65 TYPE 2 DIABETES MELLITUS WITH HYPERGLYCEMIA, WITHOUT LONG-TERM CURRENT USE OF INSULIN (HCC): Primary | ICD-10-CM

## 2024-06-03 ENCOUNTER — PATIENT MESSAGE (OUTPATIENT)
Dept: FAMILY MEDICINE CLINIC | Facility: CLINIC | Age: 69
End: 2024-06-03

## 2024-06-03 RX ORDER — TIRZEPATIDE 7.5 MG/.5ML
7.5 INJECTION, SOLUTION SUBCUTANEOUS WEEKLY
Qty: 6 ML | Refills: 1 | Status: SHIPPED | OUTPATIENT
Start: 2024-06-03

## 2024-06-03 NOTE — PATIENT COMMUNICATION
Pharmacist Tracking Tool  Reason For Outreach: Embedded Pharmacist  Demographics:  Intervention Method: shenzhoufuhart Message  Type of Intervention: Follow-Up  Topics Addressed: Diabetes  Pharmacologic Interventions: Medication Conversion  Non-Pharmacologic Interventions: N/A  Time:  Direct Patient Care:  5  mins  Care Coordination:  5  mins  Recommendation Recipient: Patient/Caregiver  Outcome: Accepted

## 2024-06-12 PROBLEM — H61.22 HEARING LOSS DUE TO CERUMEN IMPACTION, LEFT: Status: RESOLVED | Noted: 2024-05-13 | Resolved: 2024-06-12

## 2024-06-13 ENCOUNTER — HOSPITAL ENCOUNTER (OUTPATIENT)
Dept: RADIOLOGY | Facility: IMAGING CENTER | Age: 69
End: 2024-06-13
Payer: COMMERCIAL

## 2024-06-13 VITALS — BODY MASS INDEX: 40.66 KG/M2 | WEIGHT: 253 LBS | HEIGHT: 66 IN

## 2024-06-13 DIAGNOSIS — Z12.31 ENCOUNTER FOR SCREENING MAMMOGRAM FOR MALIGNANT NEOPLASM OF BREAST: ICD-10-CM

## 2024-06-13 PROCEDURE — 77063 BREAST TOMOSYNTHESIS BI: CPT

## 2024-06-13 PROCEDURE — 77067 SCR MAMMO BI INCL CAD: CPT

## 2024-07-15 DIAGNOSIS — I10 ESSENTIAL HYPERTENSION: ICD-10-CM

## 2024-07-15 DIAGNOSIS — E11.65 TYPE 2 DIABETES MELLITUS WITH HYPERGLYCEMIA, WITHOUT LONG-TERM CURRENT USE OF INSULIN (HCC): ICD-10-CM

## 2024-07-16 RX ORDER — LISINOPRIL 40 MG/1
40 TABLET ORAL DAILY
Qty: 100 TABLET | Refills: 1 | Status: SHIPPED | OUTPATIENT
Start: 2024-07-16

## 2024-07-18 ENCOUNTER — TELEPHONE (OUTPATIENT)
Dept: FAMILY MEDICINE CLINIC | Facility: CLINIC | Age: 69
End: 2024-07-18

## 2024-07-31 ENCOUNTER — PATIENT MESSAGE (OUTPATIENT)
Dept: FAMILY MEDICINE CLINIC | Facility: CLINIC | Age: 69
End: 2024-07-31

## 2024-07-31 VITALS — BODY MASS INDEX: 40.84 KG/M2 | WEIGHT: 253 LBS

## 2024-07-31 DIAGNOSIS — E11.65 TYPE 2 DIABETES MELLITUS WITH HYPERGLYCEMIA, WITHOUT LONG-TERM CURRENT USE OF INSULIN (HCC): Primary | ICD-10-CM

## 2024-07-31 DIAGNOSIS — E11.65 TYPE 2 DIABETES MELLITUS WITH HYPERGLYCEMIA, WITHOUT LONG-TERM CURRENT USE OF INSULIN (HCC): ICD-10-CM

## 2024-07-31 RX ORDER — TIRZEPATIDE 7.5 MG/.5ML
7.5 INJECTION, SOLUTION SUBCUTANEOUS WEEKLY
Qty: 6 ML | Refills: 1 | Status: SHIPPED | OUTPATIENT
Start: 2024-07-31

## 2024-07-31 NOTE — PATIENT COMMUNICATION
Pharmacist Tracking Tool  Reason For Outreach: Embedded Pharmacist  Demographics:  Intervention Method: Adcrowd retargetinghart Message  Type of Intervention: Follow-Up  Topics Addressed: Diabetes  Pharmacologic Interventions: Dose or Frequency Adjusted  Non-Pharmacologic Interventions: Care coordination  Time:  Direct Patient Care:  5  mins  Care Coordination:  5  mins  Recommendation Recipient: Patient/Caregiver  Outcome: Accepted

## 2024-08-20 DIAGNOSIS — M79.7 FIBROMYALGIA: ICD-10-CM

## 2024-08-21 RX ORDER — GABAPENTIN 300 MG/1
CAPSULE ORAL
Qty: 360 CAPSULE | Refills: 1 | Status: SHIPPED | OUTPATIENT
Start: 2024-08-21

## 2024-08-29 ENCOUNTER — TELEPHONE (OUTPATIENT)
Dept: FAMILY MEDICINE CLINIC | Facility: CLINIC | Age: 69
End: 2024-08-29

## 2024-10-02 DIAGNOSIS — G47.01 INSOMNIA DUE TO MEDICAL CONDITION: ICD-10-CM

## 2024-10-02 RX ORDER — TRAZODONE HYDROCHLORIDE 50 MG/1
TABLET, FILM COATED ORAL
Qty: 90 TABLET | Refills: 1 | Status: SHIPPED | OUTPATIENT
Start: 2024-10-02

## 2024-10-22 NOTE — PATIENT COMMUNICATION
Call received from patient.     Patient reports pain in legs is related to fibro pain, felt increase in pain severity/frequency approx 2 months ago. Does not experience every day. Reports legs are sore to the touch. Has been taking Aleve bedtime as needed and feels this somewhat alleviates pain. Takes gabapentin 300mg AM/600mg bedtime, denies missed doses. Does feel fibro signs/symptoms in arms during the day. Denies fatigue in am after taking morning gabapentin Noted that gabapentin states to take additional dose in the afternoon, patient agrees to trial 300mg AM/300mg afternoon/600mg bedtime x 2 weeks to see if that improves pain signs/symptoms.     Patient inquires about starting tumeric or fish oil to improve arthritis signs/symptoms. Patient will hold off on starting until after we can see if gabapentin has improved pain.     Patient's last weight was 249 pounds and is interested in increasing Mounjaro dose to increase weight loss potential. Order sent via [facility].     Patient to follow-up on pain improvement with increased gabapentin dose in 2 weeks.     Pharmacist Tracking Tool  Reason For Outreach: Embedded Pharmacist  Demographics:  Intervention Method: Phone  Type of Intervention: Follow-Up  Topics Addressed: Diabetes and Other  Pharmacologic Interventions: Dose or Frequency Adjusted  Non-Pharmacologic Interventions: Care coordination  Time:  Direct Patient Care:  10  mins  Care Coordination:  5  mins  Recommendation Recipient: Patient/Caregiver  Outcome: Accepted

## 2024-11-07 DIAGNOSIS — E78.00 PURE HYPERCHOLESTEROLEMIA: ICD-10-CM

## 2024-11-08 RX ORDER — ROSUVASTATIN CALCIUM 5 MG/1
5 TABLET, COATED ORAL DAILY
Qty: 90 TABLET | Refills: 1 | Status: SHIPPED | OUTPATIENT
Start: 2024-11-08

## 2024-12-03 ENCOUNTER — APPOINTMENT (OUTPATIENT)
Dept: LAB | Facility: IMAGING CENTER | Age: 69
End: 2024-12-03
Payer: COMMERCIAL

## 2024-12-03 DIAGNOSIS — I10 ESSENTIAL HYPERTENSION: ICD-10-CM

## 2024-12-03 DIAGNOSIS — E78.00 PURE HYPERCHOLESTEROLEMIA: ICD-10-CM

## 2024-12-03 DIAGNOSIS — F33.42 MDD (MAJOR DEPRESSIVE DISORDER), RECURRENT, IN FULL REMISSION (HCC): ICD-10-CM

## 2024-12-03 DIAGNOSIS — E55.9 VITAMIN D DEFICIENCY: ICD-10-CM

## 2024-12-03 DIAGNOSIS — K21.9 GASTROESOPHAGEAL REFLUX DISEASE WITHOUT ESOPHAGITIS: ICD-10-CM

## 2024-12-03 DIAGNOSIS — E11.65 TYPE 2 DIABETES MELLITUS WITH HYPERGLYCEMIA, WITHOUT LONG-TERM CURRENT USE OF INSULIN (HCC): ICD-10-CM

## 2024-12-03 LAB
ALBUMIN SERPL BCG-MCNC: 3.9 G/DL (ref 3.5–5)
ALP SERPL-CCNC: 42 U/L (ref 34–104)
ALT SERPL W P-5'-P-CCNC: 13 U/L (ref 7–52)
ANION GAP SERPL CALCULATED.3IONS-SCNC: 7 MMOL/L (ref 4–13)
AST SERPL W P-5'-P-CCNC: 19 U/L (ref 13–39)
BASOPHILS # BLD AUTO: 0.05 THOUSANDS/ΜL (ref 0–0.1)
BASOPHILS NFR BLD AUTO: 1 % (ref 0–1)
BILIRUB SERPL-MCNC: 0.45 MG/DL (ref 0.2–1)
BUN SERPL-MCNC: 9 MG/DL (ref 5–25)
CALCIUM SERPL-MCNC: 9.6 MG/DL (ref 8.4–10.2)
CHLORIDE SERPL-SCNC: 104 MMOL/L (ref 96–108)
CHOLEST SERPL-MCNC: 122 MG/DL (ref ?–200)
CO2 SERPL-SCNC: 29 MMOL/L (ref 21–32)
CREAT SERPL-MCNC: 0.76 MG/DL (ref 0.6–1.3)
EOSINOPHIL # BLD AUTO: 0.27 THOUSAND/ΜL (ref 0–0.61)
EOSINOPHIL NFR BLD AUTO: 3 % (ref 0–6)
ERYTHROCYTE [DISTWIDTH] IN BLOOD BY AUTOMATED COUNT: 12.4 % (ref 11.6–15.1)
EST. AVERAGE GLUCOSE BLD GHB EST-MCNC: 123 MG/DL
GFR SERPL CREATININE-BSD FRML MDRD: 80 ML/MIN/1.73SQ M
GLUCOSE P FAST SERPL-MCNC: 105 MG/DL (ref 65–99)
HBA1C MFR BLD: 5.9 %
HCT VFR BLD AUTO: 42.9 % (ref 34.8–46.1)
HDLC SERPL-MCNC: 58 MG/DL
HGB BLD-MCNC: 13.9 G/DL (ref 11.5–15.4)
IMM GRANULOCYTES # BLD AUTO: 0.04 THOUSAND/UL (ref 0–0.2)
IMM GRANULOCYTES NFR BLD AUTO: 0 % (ref 0–2)
LDLC SERPL CALC-MCNC: 36 MG/DL (ref 0–100)
LYMPHOCYTES # BLD AUTO: 2.11 THOUSANDS/ΜL (ref 0.6–4.47)
LYMPHOCYTES NFR BLD AUTO: 21 % (ref 14–44)
MCH RBC QN AUTO: 30.9 PG (ref 26.8–34.3)
MCHC RBC AUTO-ENTMCNC: 32.4 G/DL (ref 31.4–37.4)
MCV RBC AUTO: 95 FL (ref 82–98)
MONOCYTES # BLD AUTO: 0.5 THOUSAND/ΜL (ref 0.17–1.22)
MONOCYTES NFR BLD AUTO: 5 % (ref 4–12)
NEUTROPHILS # BLD AUTO: 7.18 THOUSANDS/ΜL (ref 1.85–7.62)
NEUTS SEG NFR BLD AUTO: 70 % (ref 43–75)
NRBC BLD AUTO-RTO: 0 /100 WBCS
PLATELET # BLD AUTO: 266 THOUSANDS/UL (ref 149–390)
PMV BLD AUTO: 11.3 FL (ref 8.9–12.7)
POTASSIUM SERPL-SCNC: 4.3 MMOL/L (ref 3.5–5.3)
PROT SERPL-MCNC: 7 G/DL (ref 6.4–8.4)
RBC # BLD AUTO: 4.5 MILLION/UL (ref 3.81–5.12)
SODIUM SERPL-SCNC: 140 MMOL/L (ref 135–147)
TRIGL SERPL-MCNC: 142 MG/DL (ref ?–150)
TSH SERPL DL<=0.05 MIU/L-ACNC: 1.56 UIU/ML (ref 0.45–4.5)
WBC # BLD AUTO: 10.15 THOUSAND/UL (ref 4.31–10.16)

## 2024-12-03 PROCEDURE — 83036 HEMOGLOBIN GLYCOSYLATED A1C: CPT

## 2024-12-03 PROCEDURE — 84443 ASSAY THYROID STIM HORMONE: CPT

## 2024-12-03 PROCEDURE — 82306 VITAMIN D 25 HYDROXY: CPT

## 2024-12-03 PROCEDURE — 80061 LIPID PANEL: CPT

## 2024-12-03 PROCEDURE — 80053 COMPREHEN METABOLIC PANEL: CPT

## 2024-12-03 PROCEDURE — 85025 COMPLETE CBC W/AUTO DIFF WBC: CPT

## 2024-12-03 PROCEDURE — 36415 COLL VENOUS BLD VENIPUNCTURE: CPT

## 2024-12-04 ENCOUNTER — TELEPHONE (OUTPATIENT)
Dept: FAMILY MEDICINE CLINIC | Facility: CLINIC | Age: 69
End: 2024-12-04

## 2024-12-05 ENCOUNTER — RESULTS FOLLOW-UP (OUTPATIENT)
Dept: FAMILY MEDICINE CLINIC | Facility: CLINIC | Age: 69
End: 2024-12-05

## 2024-12-05 LAB — 25(OH)D3 SERPL-MCNC: 49.6 NG/ML (ref 30–100)

## 2025-01-02 ENCOUNTER — PATIENT MESSAGE (OUTPATIENT)
Dept: FAMILY MEDICINE CLINIC | Facility: CLINIC | Age: 70
End: 2025-01-02

## 2025-01-02 DIAGNOSIS — E66.812 CLASS 2 OBESITY DUE TO EXCESS CALORIES WITHOUT SERIOUS COMORBIDITY WITH BODY MASS INDEX (BMI) OF 39.0 TO 39.9 IN ADULT: Primary | ICD-10-CM

## 2025-01-02 DIAGNOSIS — E11.65 TYPE 2 DIABETES MELLITUS WITH HYPERGLYCEMIA, WITHOUT LONG-TERM CURRENT USE OF INSULIN (HCC): ICD-10-CM

## 2025-01-02 DIAGNOSIS — E66.09 CLASS 2 OBESITY DUE TO EXCESS CALORIES WITHOUT SERIOUS COMORBIDITY WITH BODY MASS INDEX (BMI) OF 39.0 TO 39.9 IN ADULT: Primary | ICD-10-CM

## 2025-01-06 ENCOUNTER — PATIENT MESSAGE (OUTPATIENT)
Dept: FAMILY MEDICINE CLINIC | Facility: CLINIC | Age: 70
End: 2025-01-06

## 2025-01-06 VITALS — WEIGHT: 245 LBS | BODY MASS INDEX: 39.54 KG/M2

## 2025-01-06 DIAGNOSIS — E11.65 TYPE 2 DIABETES MELLITUS WITH HYPERGLYCEMIA, WITHOUT LONG-TERM CURRENT USE OF INSULIN (HCC): Primary | ICD-10-CM

## 2025-01-06 PROBLEM — E66.812 CLASS 2 OBESITY DUE TO EXCESS CALORIES WITHOUT SERIOUS COMORBIDITY WITH BODY MASS INDEX (BMI) OF 39.0 TO 39.9 IN ADULT: Status: ACTIVE | Noted: 2017-01-30

## 2025-01-06 PROBLEM — E66.09 CLASS 2 OBESITY DUE TO EXCESS CALORIES WITHOUT SERIOUS COMORBIDITY WITH BODY MASS INDEX (BMI) OF 39.0 TO 39.9 IN ADULT: Status: ACTIVE | Noted: 2017-01-30

## 2025-01-06 RX ORDER — TIRZEPATIDE 15 MG/.5ML
15 INJECTION, SOLUTION SUBCUTANEOUS WEEKLY
Qty: 6 ML | Refills: 3 | Status: SHIPPED | OUTPATIENT
Start: 2025-03-17

## 2025-01-06 RX ORDER — TIRZEPATIDE 12.5 MG/.5ML
12.5 INJECTION, SOLUTION SUBCUTANEOUS WEEKLY
Qty: 6 ML | Refills: 0 | Status: SHIPPED | OUTPATIENT
Start: 2025-01-06

## 2025-01-06 NOTE — ASSESSMENT & PLAN NOTE
Prior Authorization Clinical Questions for Weight Management Pharmacotherapy    1. Does the patient have a contrainidcation to medication prescribed for weight management?: No  2. Does the patient have a diagnosis of obesity, confirmed by a BMI greater than or equal to 30 kg/m^2?: Yes  3. Does the patient have a BMI of greater than or equal to 27 kg/m^2 with at least one weight-related comorbidity/risk factor/complication (e.g. diabetes, dyslipidemia, coronary artery disease)?: Yes  4. Weight-related co-morbidities/risk factors: type 2 diabetes  5. Has the patient been on a weight loss regimen of low-calorie diet, increased physical activity, and lifestyle modifications for a minimum of 6 months?: Yes  6. Has the patient completed a comprehensive weight loss program (ie, Weight Watchers, Noom, Bariatrics, other sebastian on phone)? If so, what?: No  7. Does the patient have a history of type 2 diabetes?: Yes  8. Has the member tried and failed other weight loss medication within the past 12 months?: No  9. Will the member use requested medication in combination with another GLP agonist or weight loss drug?: No  10. Is the medication a controlled substance?: No  For renewals: Has the patient had a positive outcome with current weight management medication (i.e., change in body weight of at least 4-5% after 12-16 weeks on maximally tolerated dose)?: Yes     Baseline weight (in pounds): 277 lbs  Current weight (in pounds): 245 lbs  Weight loss percentage: -11.55%

## 2025-01-06 NOTE — PATIENT COMMUNICATION
[facility] Clinical Pharmacy Services  Margarita Sebastian, Pharmacist        Assessment/ Plan     Assessment & Plan  Class 2 obesity due to excess calories without serious comorbidity with body mass index (BMI) of 39.0 to 39.9 in adult  Prior Authorization Clinical Questions for Weight Management Pharmacotherapy    1. Does the patient have a contrainidcation to medication prescribed for weight management?: No  2. Does the patient have a diagnosis of obesity, confirmed by a BMI greater than or equal to 30 kg/m^2?: Yes  3. Does the patient have a BMI of greater than or equal to 27 kg/m^2 with at least one weight-related comorbidity/risk factor/complication (e.g. diabetes, dyslipidemia, coronary artery disease)?: Yes  4. Weight-related co-morbidities/risk factors: type 2 diabetes  5. Has the patient been on a weight loss regimen of low-calorie diet, increased physical activity, and lifestyle modifications for a minimum of 6 months?: Yes  6. Has the patient completed a comprehensive weight loss program (ie, Weight Watchers, Noom, Bariatrics, other sebastian on phone)? If so, what?: No  7. Does the patient have a history of type 2 diabetes?: Yes  8. Has the member tried and failed other weight loss medication within the past 12 months?: No  9. Will the member use requested medication in combination with another GLP agonist or weight loss drug?: No  10. Is the medication a controlled substance?: No  For renewals: Has the patient had a positive outcome with current weight management medication (i.e., change in body weight of at least 4-5% after 12-16 weeks on maximally tolerated dose)?: Yes     Baseline weight (in pounds): 277 lbs  Current weight (in pounds): 245 lbs  Weight loss percentage: -11.55%              Type 2 diabetes mellitus with hyperglycemia, without long-term current use of insulin (HCC)    Lab Results   Component Value Date    HGBA1C 5.9 (H) 12/03/2024                  Objective         ASCVD Risk:  The ASCVD Risk  score (Herminia CRAIG, et al., 2019) failed to calculate for the following reasons:    The valid total cholesterol range is 130 to 320 mg/dL     Vitals:  Vitals:    01/06/25 1200   Weight: 111 kg (245 lb)       Eye Exam:    Lab Results   Component Value Date    LEFTDIABRET None 09/18/2023    RIGHTDIABRET None 09/18/2023       Labs:  Lab Results   Component Value Date    SODIUM 140 12/03/2024    K 4.3 12/03/2024    EGFR 80 12/03/2024    CREATININE 0.76 12/03/2024    GLUF 105 (H) 12/03/2024    MICROALBCRE 12 01/08/2024       Lab Results   Component Value Date    HGBA1C 5.9 (H) 12/03/2024    HGBA1C 5.9 (H) 04/23/2024    HGBA1C 7.3 (H) 01/08/2024         Pharmacist Tracking Tool     Pharmacist Tracking Tool  Reason For Outreach: Embedded Pharmacist  Demographics:  Intervention Method: 247 Techies[employer] Message  Type of Intervention: Follow-Up  Topics Addressed: Diabetes and Obesity  Pharmacologic Interventions: Dose or Frequency Adjusted  Non-Pharmacologic Interventions: N/A  Time:  Direct Patient Care:  5  mins  Care Coordination:  5  mins  Recommendation Recipient: N/A  Outcome: N/A

## 2025-01-14 ENCOUNTER — TELEPHONE (OUTPATIENT)
Dept: OBGYN CLINIC | Facility: HOSPITAL | Age: 70
End: 2025-01-14

## 2025-01-14 NOTE — TELEPHONE ENCOUNTER
Caller: Mariah    Doctor: Dr. Lachman    Reason for call: Patient is calling asking for a script to [facility] to have Diabetic Shoes and 4 pairs of custom made orthotic inserts.    Fax# [phone]     Call back#: [phone]

## 2025-01-15 ENCOUNTER — OFFICE VISIT (OUTPATIENT)
Dept: FAMILY MEDICINE CLINIC | Facility: CLINIC | Age: 70
End: 2025-01-15
Payer: COMMERCIAL

## 2025-01-15 VITALS
WEIGHT: 248 LBS | RESPIRATION RATE: 16 BRPM | TEMPERATURE: 97.6 F | HEART RATE: 85 BPM | SYSTOLIC BLOOD PRESSURE: 136 MMHG | DIASTOLIC BLOOD PRESSURE: 80 MMHG | BODY MASS INDEX: 39.86 KG/M2 | HEIGHT: 66 IN | OXYGEN SATURATION: 97 %

## 2025-01-15 DIAGNOSIS — G89.29 CHRONIC BILATERAL LOW BACK PAIN WITHOUT SCIATICA: ICD-10-CM

## 2025-01-15 DIAGNOSIS — E66.01 CLASS 3 SEVERE OBESITY DUE TO EXCESS CALORIES WITH SERIOUS COMORBIDITY AND BODY MASS INDEX (BMI) OF 40.0 TO 44.9 IN ADULT (HCC): ICD-10-CM

## 2025-01-15 DIAGNOSIS — M79.7 FIBROMYALGIA: ICD-10-CM

## 2025-01-15 DIAGNOSIS — F33.42 MDD (MAJOR DEPRESSIVE DISORDER), RECURRENT, IN FULL REMISSION (HCC): ICD-10-CM

## 2025-01-15 DIAGNOSIS — K21.9 GASTROESOPHAGEAL REFLUX DISEASE WITHOUT ESOPHAGITIS: ICD-10-CM

## 2025-01-15 DIAGNOSIS — E11.65 TYPE 2 DIABETES MELLITUS WITH HYPERGLYCEMIA, WITHOUT LONG-TERM CURRENT USE OF INSULIN (HCC): ICD-10-CM

## 2025-01-15 DIAGNOSIS — I10 ESSENTIAL HYPERTENSION: ICD-10-CM

## 2025-01-15 DIAGNOSIS — L71.9 ROSACEA: ICD-10-CM

## 2025-01-15 DIAGNOSIS — E55.9 VITAMIN D DEFICIENCY: ICD-10-CM

## 2025-01-15 DIAGNOSIS — Z00.00 MEDICARE ANNUAL WELLNESS VISIT, SUBSEQUENT: Primary | ICD-10-CM

## 2025-01-15 DIAGNOSIS — M54.50 CHRONIC BILATERAL LOW BACK PAIN WITHOUT SCIATICA: ICD-10-CM

## 2025-01-15 DIAGNOSIS — E66.813 CLASS 3 SEVERE OBESITY DUE TO EXCESS CALORIES WITH SERIOUS COMORBIDITY AND BODY MASS INDEX (BMI) OF 40.0 TO 44.9 IN ADULT (HCC): ICD-10-CM

## 2025-01-15 DIAGNOSIS — F41.9 ANXIETY: ICD-10-CM

## 2025-01-15 PROCEDURE — 99214 OFFICE O/P EST MOD 30 MIN: CPT | Performed by: FAMILY MEDICINE

## 2025-01-15 PROCEDURE — G0439 PPPS, SUBSEQ VISIT: HCPCS | Performed by: FAMILY MEDICINE

## 2025-01-15 RX ORDER — GABAPENTIN 300 MG/1
CAPSULE ORAL
Qty: 450 CAPSULE | Refills: 1 | Status: SHIPPED | OUTPATIENT
Start: 2025-01-15

## 2025-01-15 RX ORDER — LISINOPRIL 20 MG/1
20 TABLET ORAL DAILY
Qty: 100 TABLET | Refills: 3 | Status: SHIPPED | OUTPATIENT
Start: 2025-01-15

## 2025-01-15 RX ORDER — FAMOTIDINE 20 MG/1
20 TABLET, FILM COATED ORAL
Start: 2025-01-15

## 2025-01-15 NOTE — ASSESSMENT & PLAN NOTE
She is doing very well on Mounjaro.  Continue current regimen.  Lab Results   Component Value Date    HGBA1C 5.9 (H) 12/03/2024       Orders:    Albumin / creatinine urine ratio; Future    Comprehensive metabolic panel; Future    Hemoglobin A1C; Future    TSH, 3rd generation with Free T4 reflex; Future    Orthotics B/L    Diabetic Shoe    lisinopril (ZESTRIL) 20 mg tablet; Take 1 tablet (20 mg total) by mouth daily

## 2025-01-15 NOTE — ASSESSMENT & PLAN NOTE
Much improved.  Continue current regimen with as needed chronic low back pain has improved with weight loss  Orders:    Comprehensive metabolic panel; Future    lisinopril (ZESTRIL) 20 mg tablet; Take 1 tablet (20 mg total) by mouth daily    CT coronary calcium score; Future

## 2025-01-15 NOTE — PATIENT INSTRUCTIONS
Medicare Preventive Visit Patient Instructions  Thank you for completing your Welcome to Medicare Visit or Medicare Annual Wellness Visit today. Your next wellness visit will be due in one year (1/16/2026).  The screening/preventive services that you may require over the next 5-10 years are detailed below. Some tests may not apply to you based off risk factors and/or age. Screening tests ordered at today's visit but not completed yet may show as past due. Also, please note that scanned in results may not display below.  Preventive Screenings:  Service Recommendations Previous Testing/Comments   Colorectal Cancer Screening  * Colonoscopy    * Fecal Occult Blood Test (FOBT)/Fecal Immunochemical Test (FIT)  * Fecal DNA/Cologuard Test  * Flexible Sigmoidoscopy Age: 45-75 years old   Colonoscopy: every 10 years (may be performed more frequently if at higher risk)  OR  FOBT/FIT: every 1 year  OR  Cologuard: every 3 years  OR  Sigmoidoscopy: every 5 years  Screening may be recommended earlier than age 45 if at higher risk for colorectal cancer. Also, an individualized decision between you and your healthcare provider will decide whether screening between the ages of 76-85 would be appropriate. Colonoscopy: 05/04/2023  FOBT/FIT: Not on file  Cologuard: Not on file  Sigmoidoscopy: Not on file          Breast Cancer Screening Age: 40+ years old  Frequency: every 1-2 years  Not required if history of left and right mastectomy Mammogram: 06/13/2024        Cervical Cancer Screening Between the ages of 21-29, pap smear recommended once every 3 years.   Between the ages of 30-65, can perform pap smear with HPV co-testing every 5 years.   Recommendations may differ for women with a history of total hysterectomy, cervical cancer, or abnormal pap smears in past. Pap Smear: Not on file        Hepatitis C Screening Once for adults born between 1945 and 1965  More frequently in patients at high risk for Hepatitis C Hep C Antibody:  07/28/2017        Diabetes Screening 1-2 times per year if you're at risk for diabetes or have pre-diabetes Fasting glucose: 105 mg/dL (12/3/2024)  A1C: 5.9 % (12/3/2024)      Cholesterol Screening Once every 5 years if you don't have a lipid disorder. May order more often based on risk factors. Lipid panel: 12/03/2024          Other Preventive Screenings Covered by Medicare:  Abdominal Aortic Aneurysm (AAA) Screening: covered once if your at risk. You're considered to be at risk if you have a family history of AAA.  Lung Cancer Screening: covers low dose CT scan once per year if you meet all of the following conditions: (1) Age 55-77; (2) No signs or symptoms of lung cancer; (3) Current smoker or have quit smoking within the last 15 years; (4) You have a tobacco smoking history of at least 20 pack years (packs per day multiplied by number of years you smoked); (5) You get a written order from a healthcare provider.  Glaucoma Screening: covered annually if you're considered high risk: (1) You have diabetes OR (2) Family history of glaucoma OR (3)  aged 50 and older OR (4)  American aged 65 and older  Osteoporosis Screening: covered every 2 years if you meet one of the following conditions: (1) You're estrogen deficient and at risk for osteoporosis based off medical history and other findings; (2) Have a vertebral abnormality; (3) On glucocorticoid therapy for more than 3 months; (4) Have primary hyperparathyroidism; (5) On osteoporosis medications and need to assess response to drug therapy.   Last bone density test (DXA Scan): 10/16/2023.  HIV Screening: covered annually if you're between the age of 15-65. Also covered annually if you are younger than 15 and older than 65 with risk factors for HIV infection. For pregnant patients, it is covered up to 3 times per pregnancy.    Immunizations:  Immunization Recommendations   Influenza Vaccine Annual influenza vaccination during flu season is  recommended for all persons aged >= 6 months who do not have contraindications   Pneumococcal Vaccine   * Pneumococcal conjugate vaccine = PCV13 (Prevnar 13), PCV15 (Vaxneuvance), PCV20 (Prevnar 20)  * Pneumococcal polysaccharide vaccine = PPSV23 (Pneumovax) Adults 19-63 yo with certain risk factors or if 65+ yo  If never received any pneumonia vaccine: recommend Prevnar 20 (PCV20)  Give PCV20 if previously received 1 dose of PCV13 or PPSV23   Hepatitis B Vaccine 3 dose series if at intermediate or high risk (ex: diabetes, end stage renal disease, liver disease)   Respiratory syncytial virus (RSV) Vaccine - COVERED BY MEDICARE PART D  * RSVPreF3 (Arexvy) CDC recommends that adults 60 years of age and older may receive a single dose of RSV vaccine using shared clinical decision-making (SCDM)   Tetanus (Td) Vaccine - COST NOT COVERED BY MEDICARE PART B Following completion of primary series, a booster dose should be given every 10 years to maintain immunity against tetanus. Td may also be given as tetanus wound prophylaxis.   Tdap Vaccine - COST NOT COVERED BY MEDICARE PART B Recommended at least once for all adults. For pregnant patients, recommended with each pregnancy.   Shingles Vaccine (Shingrix) - COST NOT COVERED BY MEDICARE PART B  2 shot series recommended in those 19 years and older who have or will have weakened immune systems or those 50 years and older     Health Maintenance Due:      Topic Date Due   • Breast Cancer Screening: Mammogram  06/13/2025   • DXA SCAN  10/16/2025   • Colorectal Cancer Screening  05/01/2033   • Hepatitis C Screening  Completed     Immunizations Due:  There are no preventive care reminders to display for this patient.  Advance Directives   What are advance directives?  Advance directives are legal documents that state your wishes and plans for medical care. These plans are made ahead of time in case you lose your ability to make decisions for yourself. Advance directives can  apply to any medical decision, such as the treatments you want, and if you want to donate organs.   What are the types of advance directives?  There are many types of advance directives, and each state has rules about how to use them. You may choose a combination of any of the following:  Living will:  This is a written record of the treatment you want. You can also choose which treatments you do not want, which to limit, and which to stop at a certain time. This includes surgery, medicine, IV fluid, and tube feedings.   Durable power of  for healthcare (DPAHC):  This is a written record that states who you want to make healthcare choices for you when you are unable to make them for yourself. This person, called a proxy, is usually a family member or a friend. You may choose more than 1 proxy.  Do not resuscitate (DNR) order:  A DNR order is used in case your heart stops beating or you stop breathing. It is a request not to have certain forms of treatment, such as CPR. A DNR order may be included in other types of advance directives.  Medical directive:  This covers the care that you want if you are in a coma, near death, or unable to make decisions for yourself. You can list the treatments you want for each condition. Treatment may include pain medicine, surgery, blood transfusions, dialysis, IV or tube feedings, and a ventilator (breathing machine).  Values history:  This document has questions about your views, beliefs, and how you feel and think about life. This information can help others choose the care that you would choose.  Why are advance directives important?  An advance directive helps you control your care. Although spoken wishes may be used, it is better to have your wishes written down. Spoken wishes can be misunderstood, or not followed. Treatments may be given even if you do not want them. An advance directive may make it easier for your family to make difficult choices about your care.    Weight Management   Why it is important to manage your weight:  Being overweight increases your risk of health conditions such as heart disease, high blood pressure, type 2 diabetes, and certain types of cancer. It can also increase your risk for osteoarthritis, sleep apnea, and other respiratory problems. Aim for a slow, steady weight loss. Even a small amount of weight loss can lower your risk of health problems.  How to lose weight safely:  A safe and healthy way to lose weight is to eat fewer calories and get regular exercise. You can lose up about 1 pound a week by decreasing the number of calories you eat by 500 calories each day.   Healthy meal plan for weight management:  A healthy meal plan includes a variety of foods, contains fewer calories, and helps you stay healthy. A healthy meal plan includes the following:  Eat whole-grain foods more often.  A healthy meal plan should contain fiber. Fiber is the part of grains, fruits, and vegetables that is not broken down by your body. Whole-grain foods are healthy and provide extra fiber in your diet. Some examples of whole-grain foods are whole-wheat breads and pastas, oatmeal, brown rice, and bulgur.  Eat a variety of vegetables every day.  Include dark, leafy greens such as spinach, kale, nat greens, and mustard greens. Eat yellow and orange vegetables such as carrots, sweet potatoes, and winter squash.   Eat a variety of fruits every day.  Choose fresh or canned fruit (canned in its own juice or light syrup) instead of juice. Fruit juice has very little or no fiber.  Eat low-fat dairy foods.  Drink fat-free (skim) milk or 1% milk. Eat fat-free yogurt and low-fat cottage cheese. Try low-fat cheeses such as mozzarella and other reduced-fat cheeses.  Choose meat and other protein foods that are low in fat.  Choose beans or other legumes such as split peas or lentils. Choose fish, skinless poultry (chicken or turkey), or lean cuts of red meat (beef or  pork). Before you cook meat or poultry, cut off any visible fat.   Use less fat and oil.  Try baking foods instead of frying them. Add less fat, such as margarine, sour cream, regular salad dressing and mayonnaise to foods. Eat fewer high-fat foods. Some examples of high-fat foods include french fries, doughnuts, ice cream, and cakes.  Eat fewer sweets.  Limit foods and drinks that are high in sugar. This includes candy, cookies, regular soda, and sweetened drinks.  Exercise:  Exercise at least 30 minutes per day on most days of the week. Some examples of exercise include walking, biking, dancing, and swimming. You can also fit in more physical activity by taking the stairs instead of the elevator or parking farther away from stores. Ask your healthcare provider about the best exercise plan for you.      © Copyright [employer] 2018 Information is for End User's use only and may not be sold, redistributed or otherwise used for commercial purposes. All illustrations and images included in CareNotes® are the copyrighted property of A.D.A.M., Inc. or [employer]

## 2025-01-15 NOTE — TELEPHONE ENCOUNTER
Called and let her know Kay's message. She said she is on her way to see the doctor now and she will ask them at the office.

## 2025-01-15 NOTE — ASSESSMENT & PLAN NOTE
Stable Pepcid AC    Orders:    famotidine (PEPCID) 20 mg tablet; Take 1 tablet (20 mg total) by mouth daily at bedtime

## 2025-01-15 NOTE — ASSESSMENT & PLAN NOTE
She is on Mounjaro for diabetes.  Stable at this time she has some occasional situational anxiety which is currently stable.

## 2025-01-15 NOTE — LETTER
January 15, 2025     Patient: Mariah Page  YOB: 1955  Date of Visit: 1/15/2025      To Whom it May Concern:    Mariah Page is under my professional care. Mariah was seen in my office on 1/15/2025.   Please prescribe 4 sets of orthotics as well as diabetic foot wear for history of type 2 diabetes with foot deformity.    If you have any questions or concerns, please don't hesitate to call.         Sincerely,          Eric William Jr, MD        CC: No Recipients

## 2025-01-20 ENCOUNTER — APPOINTMENT (OUTPATIENT)
Dept: RADIOLOGY | Facility: HOSPITAL | Age: 70
End: 2025-01-20
Payer: COMMERCIAL

## 2025-01-25 ENCOUNTER — HOSPITAL ENCOUNTER (OUTPATIENT)
Dept: RADIOLOGY | Facility: HOSPITAL | Age: 70
Discharge: HOME | End: 2025-01-25
Payer: COMMERCIAL

## 2025-01-25 DIAGNOSIS — I10 ESSENTIAL HYPERTENSION: ICD-10-CM

## 2025-01-25 PROCEDURE — 75571 CT HRT W/O DYE W/CA TEST: CPT

## 2025-01-27 ENCOUNTER — VBI (OUTPATIENT)
Dept: ADMINISTRATIVE | Facility: OTHER | Age: 70
End: 2025-01-27

## 2025-01-27 NOTE — TELEPHONE ENCOUNTER
01/27/25 6:16 PM     Chart reviewed for Hemoglobin A1c was/were submitted to the patient's insurance.     Gurmeet Ennis MA   [facility]

## 2025-01-28 ENCOUNTER — RESULTS FOLLOW-UP (OUTPATIENT)
Dept: FAMILY MEDICINE CLINIC | Facility: CLINIC | Age: 70
End: 2025-01-28

## 2025-01-29 ENCOUNTER — PATIENT MESSAGE (OUTPATIENT)
Dept: FAMILY MEDICINE CLINIC | Facility: CLINIC | Age: 70
End: 2025-01-29

## 2025-01-29 DIAGNOSIS — Z12.31 ENCOUNTER FOR SCREENING MAMMOGRAM FOR BREAST CANCER: Primary | ICD-10-CM

## 2025-02-10 DIAGNOSIS — E11.65 TYPE 2 DIABETES MELLITUS WITH HYPERGLYCEMIA, WITHOUT LONG-TERM CURRENT USE OF INSULIN (HCC): Primary | ICD-10-CM

## 2025-02-10 NOTE — TELEPHONE ENCOUNTER
Patient needs a leather strap replacement on double upright brace right side. [facility] clinic is where it should be ordered from  and it needs to include DX code. Please put order in her MyChart. Patient will take photo from [email or web address].

## 2025-02-12 RX ORDER — DRAINAGE BAG
EACH MISCELLANEOUS CONTINUOUS
Qty: 1 EACH | Refills: 0 | Status: SHIPPED | OUTPATIENT
Start: 2025-02-12

## 2025-02-12 NOTE — TELEPHONE ENCOUNTER
Pt called in stating she requested the written script of prescription to be uploaded on her MyChart so she can download and test to  clinic to get her braces repaired. Kindly call her once script is uploaded she would want to have by today please help not to call in to [facility] pharmacy. Thanks

## 2025-02-13 PROBLEM — M76.829: Status: ACTIVE | Noted: 2025-02-13

## 2025-03-10 ENCOUNTER — PATIENT MESSAGE (OUTPATIENT)
Dept: FAMILY MEDICINE CLINIC | Facility: CLINIC | Age: 70
End: 2025-03-10

## 2025-03-10 DIAGNOSIS — L71.9 ROSACEA: Primary | ICD-10-CM

## 2025-03-10 DIAGNOSIS — L30.9 DERMATITIS: ICD-10-CM

## 2025-03-10 RX ORDER — NYSTATIN 100000 [USP'U]/G
POWDER TOPICAL 4 TIMES DAILY
Qty: 30 G | Refills: 5 | Status: SHIPPED | OUTPATIENT
Start: 2025-03-10

## 2025-03-10 RX ORDER — NYSTATIN 100000 [USP'U]/G
POWDER TOPICAL 4 TIMES DAILY
COMMUNITY
End: 2025-03-10 | Stop reason: SDUPTHER

## 2025-03-18 ENCOUNTER — TELEPHONE (OUTPATIENT)
Age: 70
End: 2025-03-18

## 2025-03-18 NOTE — TELEPHONE ENCOUNTER
Informed pharmacy that the medication we ordered is nystatin powder. They have provided a 8 day supply.

## 2025-03-18 NOTE — TELEPHONE ENCOUNTER
Bogdan calling from [facility] about medication patient reported on 3/11/25 was prescription by PCP. No record of medication on file please advise.    Bere 100,000 per gram powder  Call reference # [id number]  Bogdan - [facility] clinical Pharmacy Services  [phone]

## 2025-03-25 DIAGNOSIS — G47.01 INSOMNIA DUE TO MEDICAL CONDITION: ICD-10-CM

## 2025-03-26 DIAGNOSIS — L30.9 DERMATITIS: ICD-10-CM

## 2025-03-27 ENCOUNTER — TELEPHONE (OUTPATIENT)
Age: 70
End: 2025-03-27

## 2025-03-27 ENCOUNTER — PATIENT MESSAGE (OUTPATIENT)
Dept: FAMILY MEDICINE CLINIC | Facility: CLINIC | Age: 70
End: 2025-03-27

## 2025-03-27 DIAGNOSIS — L30.9 DERMATITIS: ICD-10-CM

## 2025-03-27 RX ORDER — NYSTATIN 100000 [USP'U]/G
POWDER TOPICAL 4 TIMES DAILY
Qty: 30 G | Refills: 5 | Status: SHIPPED | OUTPATIENT
Start: 2025-03-27

## 2025-03-27 RX ORDER — TRAZODONE HYDROCHLORIDE 50 MG/1
TABLET ORAL
Qty: 90 TABLET | Refills: 1 | Status: SHIPPED | OUTPATIENT
Start: 2025-03-27

## 2025-03-27 NOTE — TELEPHONE ENCOUNTER
Patient called looking for her NYSTATIN POWDER.  [facility] not [facility] as of yet.     It should be a generic, formulary in 90 day supply.     Please advise and follow up with patient [phone] to let her know that order has been approved and sent to pharmacy.

## 2025-03-28 ENCOUNTER — TELEPHONE (OUTPATIENT)
Age: 70
End: 2025-03-28

## 2025-03-28 DIAGNOSIS — L30.9 DERMATITIS: ICD-10-CM

## 2025-03-28 RX ORDER — NYSTATIN 100000 [USP'U]/G
POWDER TOPICAL 4 TIMES DAILY
Qty: 30 G | Refills: 5 | OUTPATIENT
Start: 2025-03-28

## 2025-03-28 NOTE — TELEPHONE ENCOUNTER
Pt called stating the wrong script was sent again to [facility] therefore is was marked as duplicate.    The script needed - and picture of bottle is in pt messages.    Generic Nystatin -  Quantity - 60 g   90 day supply  Taking 4 times a day  100,000u    [facility] -     Please advise pt when sent [phone]

## 2025-03-31 RX ORDER — NYSTATIN 100000 [USP'U]/G
POWDER TOPICAL 4 TIMES DAILY
Qty: 60 G | Refills: 5 | Status: SHIPPED | OUTPATIENT
Start: 2025-03-31

## 2025-04-09 ENCOUNTER — TELEPHONE (OUTPATIENT)
Age: 70
End: 2025-04-09

## 2025-04-09 NOTE — TELEPHONE ENCOUNTER
Zohra with [facility] called in regards to faxing over a medical necessity on 3/26 and will be faxing over to office again.

## 2025-04-10 NOTE — TELEPHONE ENCOUNTER
Per pharmacist note on 10/24/19, pending orders for signature/concurrence from Melissa Telles MD    Metformin ER
Per pharmacist note on 10/30/19, pending orders for signature/concurrence from Natty Calderon MD   [facility]
PAST SURGICAL HISTORY:  History of kidney removal 2017

## 2025-04-25 DIAGNOSIS — Z96.649 HISTORY OF TOTAL HIP REPLACEMENT, UNSPECIFIED LATERALITY: Primary | ICD-10-CM

## 2025-04-29 RX ORDER — AMOXICILLIN 500 MG/1
CAPSULE ORAL
Qty: 12 CAPSULE | Refills: 0 | Status: SHIPPED | OUTPATIENT
Start: 2025-04-29 | End: 2026-04-29

## 2025-04-29 NOTE — TELEPHONE ENCOUNTER
Patient called for status update on Rx: Amoxicillin refill    Patient stated she take it prior  to having Dental procedure done which she has an appointment next month with the Dentist.

## 2025-05-07 DIAGNOSIS — E78.00 PURE HYPERCHOLESTEROLEMIA: ICD-10-CM

## 2025-05-08 RX ORDER — ROSUVASTATIN CALCIUM 5 MG/1
5 TABLET, COATED ORAL DAILY
Qty: 90 TABLET | Refills: 1 | Status: SHIPPED | OUTPATIENT
Start: 2025-05-08

## 2025-06-17 DIAGNOSIS — E11.65 TYPE 2 DIABETES MELLITUS WITH HYPERGLYCEMIA, WITHOUT LONG-TERM CURRENT USE OF INSULIN (HCC): ICD-10-CM

## 2025-06-17 RX ORDER — METFORMIN HYDROCHLORIDE 500 MG/1
1000 TABLET, EXTENDED RELEASE ORAL DAILY
Qty: 200 TABLET | Refills: 1 | Status: SHIPPED | OUTPATIENT
Start: 2025-06-17

## 2025-06-23 ENCOUNTER — HOSPITAL ENCOUNTER (OUTPATIENT)
Dept: RADIOLOGY | Facility: IMAGING CENTER | Age: 70
Discharge: HOME | End: 2025-06-23
Payer: COMMERCIAL

## 2025-06-23 VITALS — HEIGHT: 66 IN | WEIGHT: 248 LBS | BODY MASS INDEX: 39.86 KG/M2

## 2025-06-23 DIAGNOSIS — Z12.31 ENCOUNTER FOR SCREENING MAMMOGRAM FOR BREAST CANCER: ICD-10-CM

## 2025-06-23 PROCEDURE — 77063 BREAST TOMOSYNTHESIS BI: CPT

## 2025-06-23 PROCEDURE — 77067 SCR MAMMO BI INCL CAD: CPT

## 2025-07-09 DIAGNOSIS — M79.7 FIBROMYALGIA: ICD-10-CM

## 2025-07-10 RX ORDER — GABAPENTIN 300 MG/1
CAPSULE ORAL
Qty: 450 CAPSULE | Refills: 3 | Status: SHIPPED | OUTPATIENT
Start: 2025-07-10

## 2025-07-18 ENCOUNTER — APPOINTMENT (OUTPATIENT)
Dept: LAB | Facility: IMAGING CENTER | Age: 70
End: 2025-07-18
Payer: COMMERCIAL

## 2025-07-18 DIAGNOSIS — E11.65 TYPE 2 DIABETES MELLITUS WITH HYPERGLYCEMIA, WITHOUT LONG-TERM CURRENT USE OF INSULIN (HCC): ICD-10-CM

## 2025-07-18 DIAGNOSIS — E55.9 VITAMIN D DEFICIENCY: ICD-10-CM

## 2025-07-18 DIAGNOSIS — I10 ESSENTIAL HYPERTENSION: ICD-10-CM

## 2025-07-18 LAB
25(OH)D3 SERPL-MCNC: 47.5 NG/ML (ref 30–100)
ALBUMIN SERPL BCG-MCNC: 3.9 G/DL (ref 3.5–5)
ALP SERPL-CCNC: 50 U/L (ref 34–104)
ALT SERPL W P-5'-P-CCNC: 13 U/L (ref 7–52)
ANION GAP SERPL CALCULATED.3IONS-SCNC: 10 MMOL/L (ref 4–13)
AST SERPL W P-5'-P-CCNC: 19 U/L (ref 13–39)
BILIRUB SERPL-MCNC: 0.55 MG/DL (ref 0.2–1)
BUN SERPL-MCNC: 15 MG/DL (ref 5–25)
CALCIUM SERPL-MCNC: 9.7 MG/DL (ref 8.4–10.2)
CHLORIDE SERPL-SCNC: 101 MMOL/L (ref 96–108)
CO2 SERPL-SCNC: 28 MMOL/L (ref 21–32)
CREAT SERPL-MCNC: 0.73 MG/DL (ref 0.6–1.3)
CREAT UR-MCNC: 37.2 MG/DL
GFR SERPL CREATININE-BSD FRML MDRD: 83 ML/MIN/1.73SQ M
GLUCOSE P FAST SERPL-MCNC: 99 MG/DL (ref 65–99)
MICROALBUMIN UR-MCNC: <7 MG/L
POTASSIUM SERPL-SCNC: 4 MMOL/L (ref 3.5–5.3)
PROT SERPL-MCNC: 7.2 G/DL (ref 6.4–8.4)
SODIUM SERPL-SCNC: 139 MMOL/L (ref 135–147)
TSH SERPL DL<=0.05 MIU/L-ACNC: 2.33 UIU/ML (ref 0.45–4.5)

## 2025-07-18 PROCEDURE — 80053 COMPREHEN METABOLIC PANEL: CPT

## 2025-07-18 PROCEDURE — 82043 UR ALBUMIN QUANTITATIVE: CPT

## 2025-07-18 PROCEDURE — 84443 ASSAY THYROID STIM HORMONE: CPT

## 2025-07-18 PROCEDURE — 36415 COLL VENOUS BLD VENIPUNCTURE: CPT

## 2025-07-18 PROCEDURE — 82570 ASSAY OF URINE CREATININE: CPT

## 2025-07-18 PROCEDURE — 82306 VITAMIN D 25 HYDROXY: CPT

## 2025-07-18 PROCEDURE — 83036 HEMOGLOBIN GLYCOSYLATED A1C: CPT

## 2025-07-19 LAB
EST. AVERAGE GLUCOSE BLD GHB EST-MCNC: 128 MG/DL
HBA1C MFR BLD: 6.1 %

## 2025-07-25 ENCOUNTER — OFFICE VISIT (OUTPATIENT)
Dept: FAMILY MEDICINE CLINIC | Facility: CLINIC | Age: 70
End: 2025-07-25
Payer: COMMERCIAL

## 2025-07-25 VITALS
SYSTOLIC BLOOD PRESSURE: 118 MMHG | HEART RATE: 81 BPM | BODY MASS INDEX: 38.25 KG/M2 | DIASTOLIC BLOOD PRESSURE: 72 MMHG | WEIGHT: 237 LBS | OXYGEN SATURATION: 95 %

## 2025-07-25 DIAGNOSIS — K21.9 GASTROESOPHAGEAL REFLUX DISEASE WITHOUT ESOPHAGITIS: ICD-10-CM

## 2025-07-25 DIAGNOSIS — E78.00 PURE HYPERCHOLESTEROLEMIA: ICD-10-CM

## 2025-07-25 DIAGNOSIS — E11.65 TYPE 2 DIABETES MELLITUS WITH HYPERGLYCEMIA, WITHOUT LONG-TERM CURRENT USE OF INSULIN (HCC): Primary | ICD-10-CM

## 2025-07-25 DIAGNOSIS — J30.9 ALLERGIC RHINITIS, UNSPECIFIED SEASONALITY, UNSPECIFIED TRIGGER: ICD-10-CM

## 2025-07-25 DIAGNOSIS — M79.661 RIGHT CALF PAIN: ICD-10-CM

## 2025-07-25 DIAGNOSIS — R32 URINARY INCONTINENCE, UNSPECIFIED TYPE: ICD-10-CM

## 2025-07-25 DIAGNOSIS — G47.01 INSOMNIA DUE TO MEDICAL CONDITION: ICD-10-CM

## 2025-07-25 DIAGNOSIS — E55.9 VITAMIN D DEFICIENCY: ICD-10-CM

## 2025-07-25 DIAGNOSIS — B35.6 TINEA CRURIS: ICD-10-CM

## 2025-07-25 DIAGNOSIS — E66.01 CLASS 2 SEVERE OBESITY DUE TO EXCESS CALORIES WITH SERIOUS COMORBIDITY AND BODY MASS INDEX (BMI) OF 38.0 TO 38.9 IN ADULT (HCC): ICD-10-CM

## 2025-07-25 DIAGNOSIS — M17.11 PRIMARY OSTEOARTHRITIS OF RIGHT KNEE: ICD-10-CM

## 2025-07-25 DIAGNOSIS — I10 ESSENTIAL HYPERTENSION: ICD-10-CM

## 2025-07-25 DIAGNOSIS — E66.812 CLASS 2 SEVERE OBESITY DUE TO EXCESS CALORIES WITH SERIOUS COMORBIDITY AND BODY MASS INDEX (BMI) OF 38.0 TO 38.9 IN ADULT (HCC): ICD-10-CM

## 2025-07-25 DIAGNOSIS — F33.42 MDD (MAJOR DEPRESSIVE DISORDER), RECURRENT, IN FULL REMISSION (HCC): ICD-10-CM

## 2025-07-25 PROBLEM — M76.829: Status: RESOLVED | Noted: 2025-02-13 | Resolved: 2025-07-25

## 2025-07-25 PROCEDURE — 99214 OFFICE O/P EST MOD 30 MIN: CPT | Performed by: FAMILY MEDICINE

## 2025-07-25 PROCEDURE — G2211 COMPLEX E/M VISIT ADD ON: HCPCS | Performed by: FAMILY MEDICINE

## 2025-07-25 RX ORDER — BACLOFEN 10 MG/1
10 TABLET ORAL 3 TIMES DAILY PRN
Qty: 60 TABLET | Refills: 1 | Status: SHIPPED | OUTPATIENT
Start: 2025-07-25

## 2025-07-25 RX ORDER — TRIAMCINOLONE ACETONIDE 40 MG/ML
INJECTION, SUSPENSION INTRA-ARTICULAR; INTRAMUSCULAR
COMMUNITY
Start: 2025-05-13

## 2025-07-25 RX ORDER — METFORMIN HYDROCHLORIDE 500 MG/1
500 TABLET, EXTENDED RELEASE ORAL
Qty: 100 TABLET | Refills: 3 | Status: SHIPPED | OUTPATIENT
Start: 2025-07-25

## 2025-07-25 RX ORDER — AZELASTINE 1 MG/ML
2 SPRAY, METERED NASAL 2 TIMES DAILY
Qty: 90 ML | Refills: 3 | Status: SHIPPED | OUTPATIENT
Start: 2025-07-25

## 2025-07-25 RX ORDER — BACLOFEN 10 MG/1
10 TABLET ORAL 3 TIMES DAILY PRN
COMMUNITY
End: 2025-07-25 | Stop reason: SDUPTHER

## 2025-07-25 RX ORDER — LISINOPRIL 10 MG/1
10 TABLET ORAL DAILY
Qty: 100 TABLET | Refills: 3 | Status: SHIPPED | OUTPATIENT
Start: 2025-07-25

## 2025-07-25 RX ORDER — NYSTATIN 100000 U/G
OINTMENT TOPICAL 2 TIMES DAILY
Qty: 60 G | Refills: 3 | Status: SHIPPED | OUTPATIENT
Start: 2025-07-25

## 2025-07-25 RX ORDER — LIDOCAINE HYDROCHLORIDE 10 MG/ML
2 INJECTION, SOLUTION INFILTRATION; PERINEURAL
COMMUNITY
Start: 2025-07-03

## 2025-07-25 NOTE — ASSESSMENT & PLAN NOTE
Persistent but stable.  She does not want medication for this at this time.  She has seen Dr. Laureano in the past.

## 2025-07-25 NOTE — ASSESSMENT & PLAN NOTE
She does have some chronic pain but this is stable.  She uses an Ortho boot on her right ankle which occasionally likely contributes to knee pain.

## 2025-07-25 NOTE — PROGRESS NOTES
Name: Mariah Page      : 1955      MRN: [id number]  Encounter Provider: Eric William Jr, MD  Encounter Date: 2025   Encounter department: [facility] PRIMARY [facility]  :  Assessment & Plan  Type 2 diabetes mellitus with hyperglycemia, without long-term current use of insulin (HCC)  She is doing quite well with Mounjaro.  Decrease lisinopril to 10 mg daily in light of low blood pressure readings and occasional lightheadedness.  Lab Results   Component Value Date    HGBA1C 6.1 (H) 2025       Orders:    lisinopril (ZESTRIL) 10 mg tablet; Take 1 tablet (10 mg total) by mouth daily    metFORMIN (GLUCOPHAGE-XR) 500 mg 24 hr tablet; Take 1 tablet (500 mg total) by mouth daily with breakfast    Comprehensive metabolic panel; Future    CBC and differential; Future    Lipid panel; Future    Hemoglobin A1C; Future    Gastroesophageal reflux disease without esophagitis  Reflux is stable at this time.  She is improving since weight loss.       Essential hypertension  Blood pressure has been on the lower side she has had a few episodes of orthostasis.  Decrease lisinopril to 10 mg daily.  Orders:    lisinopril (ZESTRIL) 10 mg tablet; Take 1 tablet (10 mg total) by mouth daily    Comprehensive metabolic panel; Future    CBC and differential; Future    Primary osteoarthritis of right knee  She does have some chronic pain but this is stable.  She uses an Ortho boot on her right ankle which occasionally likely contributes to knee pain.       MDD (major depressive disorder), recurrent, in full remission (HCC)  Stable at this time.  She remains on trazodone nightly.         Pure hypercholesterolemia  Stable on rosuvastatin  Orders:    Comprehensive metabolic panel; Future    CBC and differential; Future    Lipid panel; Future    Vitamin D deficiency  Monitor vitamin D  Orders:    Vitamin D 25 hydroxy; Future    Tinea cruris  She caging is tinea infections under her pannus and her breasts.  I refilled  nystatin ointment which has been helpful in the past.  Orders:    nystatin (MYCOSTATIN) ointment; Apply topically 2 (two) times a day    Right calf pain  She wears a significant orthotic on her right ankle for support.  She gets occasional significant right calf cramps which improved with as needed baclofen so I did refill this.  Orders:    baclofen 10 mg tablet; Take 1 tablet (10 mg total) by mouth 3 (three) times a day as needed for muscle spasms    Urinary incontinence, unspecified type  Persistent but stable.  She does not want medication for this at this time.  She has seen Dr. Laureano in the past.       Insomnia due to medical condition  Stable trazodone       Class 2 severe obesity due to excess calories with serious comorbidity and body mass index (BMI) of 38.0 to 38.9 in adult (HCC)    She is losing a lot of weight with Mounjaro.  Continue Mounjaro  Orders:    Comprehensive metabolic panel; Future    CBC and differential; Future    Allergic rhinitis, unspecified seasonality, unspecified trigger  Try Astelin.  Orders:    azelastine (ASTELIN) 0.1 % nasal spray; 2 sprays into each nostril 2 (two) times a day Use in each nostril as directed           History of Present Illness   HPI patient presents today accompanied by her .  She is feeling pretty well.  She is concerned about some increased skin folds on her lower legs and she is lost weight.  She also has some pain in her right knee on occasion.  She remains on Mounjaro for her obesity and type 2 diabetes and is lost significant weight.  She does note occasional lightheadedness when standing.  She denies any chest pain or palpitations.  Review of Systems   Constitutional:  Negative for appetite change, chills, fatigue, fever and unexpected weight change.   HENT:  Negative for trouble swallowing.    Eyes:  Negative for visual disturbance.   Respiratory:  Negative for cough, chest tightness, shortness of breath and wheezing.    Cardiovascular:  Negative  for chest pain, palpitations and leg swelling.   Gastrointestinal:  Negative for abdominal distention, abdominal pain, blood in stool, constipation and diarrhea.   Endocrine: Negative for polyuria.   Genitourinary:  Negative for difficulty urinating and flank pain.   Musculoskeletal:  Positive for arthralgias and gait problem. Negative for myalgias.   Skin:  Negative for rash.   Neurological:  Positive for light-headedness. Negative for dizziness.   Hematological:  Negative for adenopathy. Does not bruise/bleed easily.   Psychiatric/Behavioral:  Negative for dysphoric mood and sleep disturbance. The patient is not nervous/anxious.        Objective   /72   Pulse 81   Wt 108 kg (237 lb) Comment: Wearing Brace boot  SpO2 95%   BMI 38.25 kg/m²      Physical Exam  Constitutional:       General: She is not in acute distress.     Appearance: She is well-developed. She is obese. She is not diaphoretic.   HENT:      Head: Normocephalic.      Right Ear: External ear normal.      Left Ear: External ear normal.      Nose: Nose normal.     Eyes:      General: No scleral icterus.        Right eye: No discharge.         Left eye: No discharge.      Conjunctiva/sclera: Conjunctivae normal.      Pupils: Pupils are equal, round, and reactive to light.     Neck:      Thyroid: No thyromegaly.      Trachea: No tracheal deviation.     Cardiovascular:      Rate and Rhythm: Normal rate and regular rhythm.      Heart sounds: Normal heart sounds. No murmur heard.  Pulmonary:      Effort: Pulmonary effort is normal. No respiratory distress.      Breath sounds: Normal breath sounds. No stridor. No wheezing, rhonchi or rales.   Abdominal:      General: Bowel sounds are normal. There is no distension.      Palpations: Abdomen is soft.      Tenderness: There is no abdominal tenderness. There is no guarding.     Musculoskeletal:         General: No tenderness or deformity. Normal range of motion.      Right lower leg: No edema.      Left  lower leg: No edema.   Lymphadenopathy:      Cervical: No cervical adenopathy.     Skin:     General: Skin is warm.      Coloration: Skin is not pale.      Findings: No erythema or rash.      Comments: She is concerned about some asymmetry of her thigh areas bilaterally.  She does have a lot of redundant skin in particular since she is lost weight which seems to be contributing to most of her concerns.  There is no palpable abnormality upon deep palpation of her bilateral thighs.  Hold on further imaging at this time.     Neurological:      Cranial Nerves: No cranial nerve deficit.      Coordination: Coordination normal.     Psychiatric:         Mood and Affect: Mood normal.         Behavior: Behavior normal.         Thought Content: Thought content normal.

## 2025-07-25 NOTE — ASSESSMENT & PLAN NOTE
Stable on rosuvastatin  Orders:    Comprehensive metabolic panel; Future    CBC and differential; Future    Lipid panel; Future

## 2025-07-25 NOTE — ASSESSMENT & PLAN NOTE
Blood pressure has been on the lower side she has had a few episodes of orthostasis.  Decrease lisinopril to 10 mg daily.  Orders:    lisinopril (ZESTRIL) 10 mg tablet; Take 1 tablet (10 mg total) by mouth daily    Comprehensive metabolic panel; Future    CBC and differential; Future

## 2025-07-25 NOTE — ASSESSMENT & PLAN NOTE
She is doing quite well with Mounjaro.  Decrease lisinopril to 10 mg daily in light of low blood pressure readings and occasional lightheadedness.  Lab Results   Component Value Date    HGBA1C 6.1 (H) 07/18/2025       Orders:    lisinopril (ZESTRIL) 10 mg tablet; Take 1 tablet (10 mg total) by mouth daily    metFORMIN (GLUCOPHAGE-XR) 500 mg 24 hr tablet; Take 1 tablet (500 mg total) by mouth daily with breakfast    Comprehensive metabolic panel; Future    CBC and differential; Future    Lipid panel; Future    Hemoglobin A1C; Future

## 2025-07-25 NOTE — ASSESSMENT & PLAN NOTE
{If prescribing weight loss medication, click here to fill out prior auth smartform and then hit F2 with this smartlist to insert prior auth documentation (Optional):[id number]}  She is losing a lot of weight with Mounjaro.  Continue Mounjaro  Orders:    Comprehensive metabolic panel; Future    CBC and differential; Future

## 2025-08-06 ENCOUNTER — TELEPHONE (OUTPATIENT)
Age: 70
End: 2025-08-06

## 2025-08-14 DIAGNOSIS — G47.01 INSOMNIA DUE TO MEDICAL CONDITION: ICD-10-CM

## 2025-08-18 RX ORDER — TRAZODONE HYDROCHLORIDE 50 MG/1
50 TABLET ORAL
Qty: 90 TABLET | Refills: 3 | Status: SHIPPED | OUTPATIENT
Start: 2025-08-18

## (undated) DEVICE — SINGLE-USE BIOPSY FORCEPS: Brand: RADIAL JAW 4